# Patient Record
Sex: MALE | Race: WHITE
[De-identification: names, ages, dates, MRNs, and addresses within clinical notes are randomized per-mention and may not be internally consistent; named-entity substitution may affect disease eponyms.]

---

## 2017-01-30 NOTE — EDDOCDS
Physician Documentation                                                                           

[facility]                                                                         

Name: Dejuan Knowles                                                                              

Age: 61 yrs                                                                                       

Sex: Male                                                                                         

: 1955                                                                                   

MRN: [id number]                                                                                     

Arrival Date: 2017                                                                          

Time: 14:44                                                                                       

Account#: [id number]                                                                              

Bed 6                                                                                             

Private MD: Jai Malin                                                                          

Disposition:                                                                                      

                                                                                             

15:51 Critical Care: Critical care not applicable.                                            pc  

                                                                                                  

Disposition:                                                                                      

17 15:54 Transfer ordered to [facility]. Diagnosis are Non-pressure      

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                            

- Reason for transfer: Higher level of care.                                                      

- Accepting physician is Dr. Hawley for Dr. Ga.                                               

- Condition is Stable.                                                                            

- Problem is an ongoing problem.                                                                  

- Symptoms are unchanged.                                                                         

                                                                                                  

                                                                                                  

                                                                                                  

HPI:                                                                                              

15:47 This 61 yrs old  Male presents to ER via Wheelchair with complaints of FOOT    pc  

      INFECTION.                                                                                  

15:47 The history is obtained from the patient. He was sent from his Podiatrist's office to     

      arrange transfer to [facility]. He has a MRSA foot ulcer in the setting of vascular disease        

      and DM. At their worst, the symptoms were moderate. In the emergency department, the        

      symptoms are unchanged. The patient has experienced a previous episode, last month.         

                                                                                                  

Historical:                                                                                       

- Allergies: Metformin HCl;                                                                       

- Home Meds:                                                                                      

1. aspirin 81 mg Oral tab 1 tab once daily                                                      

2. Levemir 100 unit/mL subcutaneous soln 30 units qhs                                           

3. nitroglycerin 0.4 mg SL subl 1 tab every 5 minutes as needed                                 

4. gabapentin 300 mg/6 mL (6 mL) Oral soln 3 times per day                                      

5. Bactrim -160 mg Oral tab 1 tab 2 times per day                                         

- PMHx: Diabetes - IDDM: controlled; Hypertension; MI; MRSA;                                      

- PSHx: right foot surgery; neck tumor; Appendectomy; Orchiectomy- Left;                          

- The history from nurses notes was reviewed: and I agree with what is documented.                

- Social history: Smoking status: Patient states former smoker of tobacco. No barriers            

to communication noted, The patient speaks fluent English.                                      

- Family history: Not pertinent.                                                                  

- : The pt / caregiver states he / she is not on anticoagulants. Home medication list             

is obtained from the patient, [employer] import data.                                              

- Hospitalizations: : No recent hospitalization is reported.                                      

- Exposure Risk Screening:: None identified.                                                      

- Immunization history:: All immunizations up-to-date.                                            

- Social history:: the patient is a former smoker, the patient does not drink alcohol.            

                                                                                                  

                                                                                                  

ROS:                                                                                              

15:47 All systems are negative except as listed.                                              pc  

                                                                                                  

Exam:                                                                                             

15:47 General Appearance: no acute distress, alert.                                           pc  

15:47 Respiratory: no respiratory distress, Breath sounds: wheezing, scattered.                   

15:47 CVS: regular pulse rate, regular rhythm, normal S1 and S2, no murmurs, strong               

      peripheral pulses.                                                                          

15:47 Extremities: right foot in dressing and walking boot, applied just PTA by Podiatry and      

      will not be examined further.                                                               

15:47 Neuro: oriented x 3, cranial nerves normal as tested.                                       

                                                                                                  

Vital Signs:                                                                                      

14:47  / 68; Pulse 69; Resp 18 S; Temp 96.8(O); Pulse Ox 96% on R/A; Weight 149.69 kg / dd6 

      330.01 lbs (R); Height 6 ft. 0 in. (182.88 cm) (R);                                         

17:37  / 72; Pulse 74; Resp 18; Temp 95.8(O); Pulse Ox 95% on R/A;                      srm 

14:47 Body Mass Index 44.76 (149.69 kg, 182.88 cm)                                            dd6 

                                                                                                  

MDM:                                                                                              

15:45 [facility] Payment Agreement was scanned into [employer] and attached to record.               [facility] 

15:45 Financial registration complete.                                                        [facility] 

15:51 Differential Diagnosis: diabetic right foot ulcer, MRSA positive. Plan: d/w Dr. roman Hawley re: labs, etc. Data reviewed: old medical records, vital signs, nurses notes.      

      Test interpretation: none. The patient has been re-examined and re-evaluated. The           

      clinical presentation did not require any ED treatment or interventions. Physician          

      consultation: Dr. Hawley was contacted at 15:52, regarding patient's condition, and       

      he in fact accepted the patient transfer from Dr. Saravia (podiatry) and does not         

      want any labs or testing done prior to transfer. . Disposition: The historical points,      

      examination findings, and any diagnostic results supporting the provided diagnosis,         

      were discussed with the patient or legal guardian. The decision to transfer to the          

      patient to another facility was explained, based on the need for a required specialist      

      that [facility] does not immediately have available.                          

                                                                                                  

Signatures:                                                                                       

Jean Saab MD MD pc Michelson, Staci, RN                    RN   Opal Durbin RN RN dls Beck, Gabriela gjb                                                  

                                                                                                  

The chart was reviewed and I authenticate all verbal orders and agree with the evaluation and 
treatment provided.Corrections: (The following items were deleted from the chart)

15:25 15:02 Home Meds: antibiotic; dls                                                        srm 

                                                                                                  

Attachments:                                                                                      

15:45 [facility] Payment Agreement                                                                violetta 

                                                                                                  

**************************************************************************************************

MTDD

## 2017-01-30 NOTE — EDDOCDS
Nurse's Notes                                                                                     

[facility]                                                                         

Name: Dejuan Knowles                                                                              

Age: 61 yrs                                                                                       

Sex: Male                                                                                         

: 1955                                                                                   

MRN: [id number]                                                                                     

Arrival Date: 2017                                                                          

Time: 14:44                                                                                       

Account#: [id number]                                                                              

Bed 6                                                                                             

Private MD: Jai Malin                                                                          

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        

                                                                                                  

Presentation:                                                                                     

                                                                                             

14:57 Presenting complaint: Patient states: Pt sent to ED from Dr Gonzalez.s office for      dls 

      vascular problem right foot pt unable to transport himself to [place] to vascular          

      specialist. Adult Sepsis Screening: The patient does not have new or worsening altered      

      mentation. Patient's respiratory rate is less than 22. Systolic blood pressure is           

      greater than 100. Patient has a qSOFA score of 0- Negative Sepsis Screen.                   

      Suicide/Homicide risk assessment- the patient denies having any suicidal and/or             

      homicidal ideations and does not present with any other emotional, behavioral or mental     

      health complaints.  Status: Patient is not a  or              

      dependent. Transition of care: patient was received from Lisa.                         

14:57 Acuity: KAITLIN Level 3                                                                     dls 

14:57 Method Of Arrival: Wheelchair                                                           dls 

                                                                                                  

Triage Assessment:                                                                                

15:02 General: Appears uncomfortable, Behavior is cooperative. Pain: Pain currently is 2 out  dls 

      of 10 on a pain scale. HIV screening NA for this visit Offered previously.                  

                                                                                                  

Historical:                                                                                       

- Allergies: Metformin HCl;                                                                       

- Home Meds:                                                                                      

1. aspirin 81 mg Oral tab 1 tab once daily                                                      

2. Levemir 100 unit/mL subcutaneous soln 30 units qhs                                           

3. nitroglycerin 0.4 mg SL subl 1 tab every 5 minutes as needed                                 

4. gabapentin 300 mg/6 mL (6 mL) Oral soln 3 times per day                                      

5. Bactrim -160 mg Oral tab 1 tab 2 times per day                                         

- PMHx: Diabetes - IDDM: controlled; Hypertension; MI; MRSA;                                      

- PSHx: right foot surgery; neck tumor; Appendectomy; Orchiectomy- Left;                          

- The history from nurses notes was reviewed: and I agree with what is documented.                

- Social history: Smoking status: Patient states former smoker of tobacco. No barriers            

to communication noted, The patient speaks fluent English.                                      

- Family history: Not pertinent.                                                                  

- : The pt / caregiver states he / she is not on anticoagulants. Home medication list             

is obtained from the patient, [employer] import data.                                              

- Hospitalizations: : No recent hospitalization is reported.                                      

- Exposure Risk Screening:: None identified.                                                      

- Immunization history:: All immunizations up-to-date.                                            

- Social history:: the patient is a former smoker, the patient does not drink alcohol.            

                                                                                                  

                                                                                                  

Screenin:35 Screening information is obtained from the patient. Screening information is obtained   srm 

      from pt has PHN that comes in to asses foot wound. Fall risk: No risks identified.          

      Assistance ADL's: requires no assistance with activities of daily living. Abuse/DV          

      Screen: The patient / caregiver reports he/she is: not in a situation that causes fear,     

      pain or injury. Nutritional screening: No deficits noted. Advance Directives:               

      Currently, there is no health care proxy. There is no active DNR order. home support is     

      adequate.                                                                                   

                                                                                                  

Assessment:                                                                                       

15:24 General: Appears in no apparent distress, Behavior is appropriate for age, cooperative. srm 

      Respiratory: No deficits noted. GI: No deficits noted.                                      

16:37 Reassessment: Patient appears in no apparent distress at this time.                     srm 

17:37 Reassessment: dressing remains intact from dr ortez's office.. General: Appears in  srm 

      no apparent distress, Behavior is appropriate for age, cooperative. Neurological: Level     

      of Consciousness is awake, alert, Oriented to person, place, time,  are equal          

      bilaterally Moves all extremities. Full function Gait is normal for pt. Speech is           

      normal, Facial symmetry appears normal. EENT: No deficits noted. Cardiovascular: No         

      deficits noted. Respiratory: No deficits noted.                                             

                                                                                                  

Vital Signs:                                                                                      

14:47  / 68; Pulse 69; Resp 18 S; Temp 96.8(O); Pulse Ox 96% on R/A; Weight 149.69 kg   dd6 

      (R); Height 6 ft. 0 in. (182.88 cm) (R);                                                    

17:37  / 72; Pulse 74; Resp 18; Temp 95.8(O); Pulse Ox 95% on R/A;                      srm 

14:47 Body Mass Index 44.76 (149.69 kg, 182.88 cm)                                            dd6 

                                                                                                  

Vitals:                                                                                           

14:47 Log In Time: 2017 at 14:46.                                                 dd6 

                                                                                                  

ED Course:                                                                                        

14:46 Patient visited by Bartolo Siu PCA.                                             dd6 

14:46 Jai Malin DO is Private Physician.                                                  dd6 

14:46 Patient moved to Waiting                                                                dd6 

14:48 Patient moved to Pre RCE                                                                dd6 

15:00 Triage Initiated                                                                        dls 

15:03 Sahra Dennison RN is Primary Nurse.                                                  dls 

15:03 Patient moved to 6                                                                      dls 

15:05 Jean Saab MD is Attending Physician.                                               pc  

15:24 Patient visited by Jean Saab MD.                                                   pc  

15:24 The patient / caregiver is instructed regarding the plan of care and ED course. Patient srm 

      has correct armband on for positive identification. Bed in low position.                    

15:25 Patient visited by Sahra Dennison RN.                                                srm 

15:45 [facility] Payment Agreement was scanned into [employer] and attached to record.               gjb 

16:14 Patient name changed from Dejuan\S\\S\Virkler\S\ to Dejuan\S\ \S\Virkler.                      
   EDMS

16:34 at 1627 report called to andrew la rn from [facility].                                 srm 

16:35 No IV's were initiated during this patient's visit. No procedures done that require     srm 

      assistance.                                                                                 

16:37 Patient visited by Sahra Dennison RN.                                                srm 

                                                                                                  

Intake:                                                                                           

17:37 PO: 600.00ml; Total: 600.00ml.                                                          srm 

                                                                                                  

Order Results:                                                                                    

There are currently no results for this order.                                                    

Outcome:                                                                                          

15:54 ER care complete, transfer ordered by Provider.                                         pc  

16:35 Condition: stable. No special radiology studies were completed. Admission hand-off:     srm 

      Report called to krista la rn.                                                                

17:37 Discharge Assessment: Patient awake, alert and oriented x 3. No cognitive and/or        srm 

      functional deficits noted. Patient verbalized understanding of disposition                  

      instructions. patient administered narcotics - no. The following High Risk Discharge        

      criteria are identified: None. Transferred to [facility]. by EMS         

      ground [facility] ambulance report to accompanying personnel perry boateng and christal roman,         

      Transfer form completed. Property :Personal belongings accompany Pt.                        

17:40 Patient left the ED.                                                                    srm 

                                                                                                  

Signatures:                                                                                       

Dispatcher [employer]                           EDMS                                                 

Jean Saab MD MD                                                      

Sahra Dennison RN RN srm Scott, Debra, RN RN   [facility]                                                  

Bartolo Siu, PCA                 PCA  dd6                                                  

Lalitha Yeboah                               gj                                                  

                                                                                                  

Corrections: (The following items were deleted from the chart)                                    

15:25 15:02 Home Meds: antibiotic; dls                                                        srm 

                                                                                                  

**************************************************************************************************

MTDD

## 2017-02-01 NOTE — EDDOCDS
Nurse's Notes                                                                                     

[facility]                                                                         

Name: Dejuan Knowles                                                                              

Age: 61 yrs                                                                                       

Sex: Male                                                                                         

: 1955                                                                                   

MRN: [id number]                                                                                     

Arrival Date: 2017                                                                          

Time: 14:44                                                                                       

Account#: [id number]                                                                              

Bed 6                                                                                             

Private MD: Jai Malin                                                                          

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        

                                                                                                  

Presentation:                                                                                     

                                                                                             

14:57 Presenting complaint: Patient states: Pt sent to ED from Dr Gonzalez.s office for      dls 

      vascular problem right foot pt unable to transport himself to [place] to vascular          

      specialist. Adult Sepsis Screening: The patient does not have new or worsening altered      

      mentation. Patient's respiratory rate is less than 22. Systolic blood pressure is           

      greater than 100. Patient has a qSOFA score of 0- Negative Sepsis Screen.                   

      Suicide/Homicide risk assessment- the patient denies having any suicidal and/or             

      homicidal ideations and does not present with any other emotional, behavioral or mental     

      health complaints.  Status: Patient is not a  or              

      dependent. Transition of care: patient was received from Lisa.                         

14:57 Acuity: KAITLIN Level 3                                                                     dls 

14:57 Method Of Arrival: Wheelchair                                                           dls 

                                                                                                  

Triage Assessment:                                                                                

15:02 General: Appears uncomfortable, Behavior is cooperative. Pain: Pain currently is 2 out  dls 

      of 10 on a pain scale. HIV screening NA for this visit Offered previously.                  

                                                                                                  

Historical:                                                                                       

- Allergies: Metformin HCl;                                                                       

- Home Meds:                                                                                      

1. aspirin 81 mg Oral tab 1 tab once daily                                                      

2. Levemir 100 unit/mL subcutaneous soln 30 units qhs                                           

3. nitroglycerin 0.4 mg SL subl 1 tab every 5 minutes as needed                                 

4. gabapentin 300 mg/6 mL (6 mL) Oral soln 3 times per day                                      

5. Bactrim -160 mg Oral tab 1 tab 2 times per day                                         

- PMHx: Diabetes - IDDM: controlled; Hypertension; MI; MRSA;                                      

- PSHx: right foot surgery; neck tumor; Appendectomy; Orchiectomy- Left;                          

- The history from nurses notes was reviewed: and I agree with what is documented.                

- Social history: Smoking status: Patient states former smoker of tobacco. No barriers            

to communication noted, The patient speaks fluent English.                                      

- Family history: Not pertinent.                                                                  

- : The pt / caregiver states he / she is not on anticoagulants. Home medication list             

is obtained from the patient, [employer] import data.                                              

- Hospitalizations: : No recent hospitalization is reported.                                      

- Exposure Risk Screening:: None identified.                                                      

- Immunization history:: All immunizations up-to-date.                                            

- Social history:: the patient is a former smoker, the patient does not drink alcohol.            

                                                                                                  

                                                                                                  

Screenin:35 Screening information is obtained from the patient. Screening information is obtained   srm 

      from pt has PHN that comes in to asses foot wound. Fall risk: No risks identified.          

      Assistance ADL's: requires no assistance with activities of daily living. Abuse/DV          

      Screen: The patient / caregiver reports he/she is: not in a situation that causes fear,     

      pain or injury. Nutritional screening: No deficits noted. Advance Directives:               

      Currently, there is no health care proxy. There is no active DNR order. home support is     

      adequate.                                                                                   

                                                                                                  

Assessment:                                                                                       

15:24 General: Appears in no apparent distress, Behavior is appropriate for age, cooperative. srm 

      Respiratory: No deficits noted. GI: No deficits noted.                                      

16:37 Reassessment: Patient appears in no apparent distress at this time.                     srm 

17:37 Reassessment: dressing remains intact from dr ortez's office.. General: Appears in  srm 

      no apparent distress, Behavior is appropriate for age, cooperative. Neurological: Level     

      of Consciousness is awake, alert, Oriented to person, place, time,  are equal          

      bilaterally Moves all extremities. Full function Gait is normal for pt. Speech is           

      normal, Facial symmetry appears normal. EENT: No deficits noted. Cardiovascular: No         

      deficits noted. Respiratory: No deficits noted.                                             

                                                                                                  

Vital Signs:                                                                                      

14:47  / 68; Pulse 69; Resp 18 S; Temp 96.8(O); Pulse Ox 96% on R/A; Weight 149.69 kg   dd6 

      (R); Height 6 ft. 0 in. (182.88 cm) (R);                                                    

17:37  / 72; Pulse 74; Resp 18; Temp 95.8(O); Pulse Ox 95% on R/A;                      srm 

14:47 Body Mass Index 44.76 (149.69 kg, 182.88 cm)                                            dd6 

                                                                                                  

Vitals:                                                                                           

14:47 Log In Time: 2017 at 14:46.                                                 dd6 

                                                                                                  

ED Course:                                                                                        

14:46 Patient visited by Bartolo Siu PCA.                                             dd6 

14:46 Jai Malin DO is Private Physician.                                                  dd6 

14:46 Patient moved to Waiting                                                                dd6 

14:48 Patient moved to Pre RCE                                                                dd6 

15:00 Triage Initiated                                                                        dls 

15:03 Sahra Dennison RN is Primary Nurse.                                                  dls 

15:03 Patient moved to 6                                                                      dls 

15:05 Jean Saab MD is Attending Physician.                                               pc  

15:24 Patient visited by Jean Saab MD.                                                   pc  

15:24 The patient / caregiver is instructed regarding the plan of care and ED course. Patient srm 

      has correct armband on for positive identification. Bed in low position.                    

15:25 Patient visited by Sahra Dennison RN.                                                srm 

15:45 [facility] Payment Agreement was scanned into [employer] and attached to record.               gjb 

16:14 Patient name changed from Dejuan\S\\S\Virkler\S\ to Dejuan\S\ \S\Virkler.                      
   EDMS

16:34 at 1627 report called to andrew la rn from [facility].                                 srm 

16:35 No IV's were initiated during this patient's visit. No procedures done that require     srm 

      assistance.                                                                                 

16:37 Patient visited by Sahra Dennison RN.                                                srm 

                                                                                                  

Intake:                                                                                           

17:37 PO: 600.00ml; Total: 600.00ml.                                                          srm 

                                                                                                  

Order Results:                                                                                    

There are currently no results for this order.                                                    

Outcome:                                                                                          

15:54 ER care complete, transfer ordered by Provider.                                         pc  

16:35 Condition: stable. No special radiology studies were completed. Admission hand-off:     srm 

      Report called to krista la rn.                                                                

17:37 Discharge Assessment: Patient awake, alert and oriented x 3. No cognitive and/or        srm 

      functional deficits noted. Patient verbalized understanding of disposition                  

      instructions. patient administered narcotics - no. The following High Risk Discharge        

      criteria are identified: None. Transferred to [facility]. by EMS         

      ground [facility] ambulance report to accompanying personnel perry boateng and christal roman,         

      Transfer form completed. Property :Personal belongings accompany Pt.                        

17:40 Patient left the ED.                                                                    srm 

                                                                                                  

Signatures:                                                                                       

Dispatcher [employer]                           EDMS                                                 

Jean Saab MD MD                                                      

Sahra Dennison RN RN srm Scott, Debra, RN RN   [facility]                                                  

Bartolo Siu, PCA                 PCA  dd6                                                  

Lalitha Yeboah                               gj                                                  

                                                                                                  

Corrections: (The following items were deleted from the chart)                                    

15:25 15:02 Home Meds: antibiotic; dls                                                        srm 

                                                                                                  

**************************************************************************************************



*** Chart Complete ***
MTDD

## 2017-02-01 NOTE — EDDOCDS
Physician Documentation                                                                           

[facility]                                                                         

Name: Dejuan Knowles                                                                              

Age: 61 yrs                                                                                       

Sex: Male                                                                                         

: 1955                                                                                   

MRN: [id number]                                                                                     

Arrival Date: 2017                                                                          

Time: 14:44                                                                                       

Account#: [id number]                                                                              

Bed 6                                                                                             

Private MD: Jai Malin                                                                          

Disposition:                                                                                      

                                                                                             

15:51 Critical Care: Critical care not applicable.                                            pc  

                                                                                                  

Disposition:                                                                                      

17 15:54 Transfer ordered to [facility]. Diagnosis are Non-pressure      

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                            

- Reason for transfer: Higher level of care.                                                      

- Accepting physician is Dr. Hawley for Dr. Ga.                                               

- Condition is Stable.                                                                            

- Problem is an ongoing problem.                                                                  

- Symptoms are unchanged.                                                                         

                                                                                                  

                                                                                                  

                                                                                                  

HPI:                                                                                              

15:47 This 61 yrs old  Male presents to ER via Wheelchair with complaints of FOOT    pc  

      INFECTION.                                                                                  

15:47 The history is obtained from the patient. He was sent from his Podiatrist's office to     

      arrange transfer to [facility]. He has a MRSA foot ulcer in the setting of vascular disease        

      and DM. At their worst, the symptoms were moderate. In the emergency department, the        

      symptoms are unchanged. The patient has experienced a previous episode, last month.         

                                                                                                  

Historical:                                                                                       

- Allergies: Metformin HCl;                                                                       

- Home Meds:                                                                                      

1. aspirin 81 mg Oral tab 1 tab once daily                                                      

2. Levemir 100 unit/mL subcutaneous soln 30 units qhs                                           

3. nitroglycerin 0.4 mg SL subl 1 tab every 5 minutes as needed                                 

4. gabapentin 300 mg/6 mL (6 mL) Oral soln 3 times per day                                      

5. Bactrim -160 mg Oral tab 1 tab 2 times per day                                         

- PMHx: Diabetes - IDDM: controlled; Hypertension; MI; MRSA;                                      

- PSHx: right foot surgery; neck tumor; Appendectomy; Orchiectomy- Left;                          

- The history from nurses notes was reviewed: and I agree with what is documented.                

- Social history: Smoking status: Patient states former smoker of tobacco. No barriers            

to communication noted, The patient speaks fluent English.                                      

- Family history: Not pertinent.                                                                  

- : The pt / caregiver states he / she is not on anticoagulants. Home medication list             

is obtained from the patient, [employer] import data.                                              

- Hospitalizations: : No recent hospitalization is reported.                                      

- Exposure Risk Screening:: None identified.                                                      

- Immunization history:: All immunizations up-to-date.                                            

- Social history:: the patient is a former smoker, the patient does not drink alcohol.            

                                                                                                  

                                                                                                  

ROS:                                                                                              

15:47 All systems are negative except as listed.                                              pc  

                                                                                                  

Exam:                                                                                             

15:47 General Appearance: no acute distress, alert.                                           pc  

15:47 Respiratory: no respiratory distress, Breath sounds: wheezing, scattered.                   

15:47 CVS: regular pulse rate, regular rhythm, normal S1 and S2, no murmurs, strong               

      peripheral pulses.                                                                          

15:47 Extremities: right foot in dressing and walking boot, applied just PTA by Podiatry and      

      will not be examined further.                                                               

15:47 Neuro: oriented x 3, cranial nerves normal as tested.                                       

                                                                                                  

Vital Signs:                                                                                      

14:47  / 68; Pulse 69; Resp 18 S; Temp 96.8(O); Pulse Ox 96% on R/A; Weight 149.69 kg / dd6 

      330.01 lbs (R); Height 6 ft. 0 in. (182.88 cm) (R);                                         

17:37  / 72; Pulse 74; Resp 18; Temp 95.8(O); Pulse Ox 95% on R/A;                      srm 

14:47 Body Mass Index 44.76 (149.69 kg, 182.88 cm)                                            dd6 

                                                                                                  

MDM:                                                                                              

15:45 [facility] Payment Agreement was scanned into [employer] and attached to record.               [facility] 

15:45 Financial registration complete.                                                        [facility] 

15:51 Differential Diagnosis: diabetic right foot ulcer, MRSA positive. Plan: d/w Dr. roman Hawley re: labs, etc. Data reviewed: old medical records, vital signs, nurses notes.      

      Test interpretation: none. The patient has been re-examined and re-evaluated. The           

      clinical presentation did not require any ED treatment or interventions. Physician          

      consultation: Dr. Hwaley was contacted at 15:52, regarding patient's condition, and       

      he in fact accepted the patient transfer from Dr. Saravia (podiatry) and does not         

      want any labs or testing done prior to transfer. . Disposition: The historical points,      

      examination findings, and any diagnostic results supporting the provided diagnosis,         

      were discussed with the patient or legal guardian. The decision to transfer to the          

      patient to another facility was explained, based on the need for a required specialist      

      that [facility] does not immediately have available.                          

                                                                                                  

Signatures:                                                                                       

Jean Saab MD MD pc Michelson, Staci, RN                    RN   Opal Durbin RN RN dls Beck, Gabriela gjb                                                  

                                                                                                  

The chart was reviewed and I authenticate all verbal orders and agree with the evaluation and 
treatment provided.Corrections: (The following items were deleted from the chart)

15:25 15:02 Home Meds: antibiotic; dls                                                        srm 

                                                                                                  

Attachments:                                                                                      

15:45 [facility] Payment Agreement                                                                violetta 

                                                                                                  

**************************************************************************************************



*** Chart Complete ***
MTDD

## 2017-02-01 NOTE — EDDOCDS
Physician Documentation                                                                           

[facility]                                                                         

Name: Dejuan Knowles                                                                              

Age: 61 yrs                                                                                       

Sex: Male                                                                                         

: 1955                                                                                   

MRN: [id number]                                                                                     

Arrival Date: 2017                                                                          

Time: 14:44                                                                                       

Account#: [id number]                                                                              

Bed 6                                                                                             

Private MD: Jai Malin                                                                          

Disposition:                                                                                      

                                                                                             

15:51 Critical Care: Critical care not applicable.                                            pc  

                                                                                                  

Disposition:                                                                                      

17 15:54 Transfer ordered to [facility]. Diagnosis are Non-pressure      

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                            

- Reason for transfer: Higher level of care.                                                      

- Accepting physician is Dr. Hawley for Dr. Ga.                                               

- Condition is Stable.                                                                            

- Problem is an ongoing problem.                                                                  

- Symptoms are unchanged.                                                                         

                                                                                                  

                                                                                                  

                                                                                                  

HPI:                                                                                              

15:47 This 61 yrs old  Male presents to ER via Wheelchair with complaints of FOOT    pc  

      INFECTION.                                                                                  

15:47 The history is obtained from the patient. He was sent from his Podiatrist's office to     

      arrange transfer to [facility]. He has a MRSA foot ulcer in the setting of vascular disease        

      and DM. At their worst, the symptoms were moderate. In the emergency department, the        

      symptoms are unchanged. The patient has experienced a previous episode, last month.         

                                                                                                  

Historical:                                                                                       

- Allergies: Metformin HCl;                                                                       

- Home Meds:                                                                                      

1. aspirin 81 mg Oral tab 1 tab once daily                                                      

2. Levemir 100 unit/mL subcutaneous soln 30 units qhs                                           

3. nitroglycerin 0.4 mg SL subl 1 tab every 5 minutes as needed                                 

4. gabapentin 300 mg/6 mL (6 mL) Oral soln 3 times per day                                      

5. Bactrim -160 mg Oral tab 1 tab 2 times per day                                         

- PMHx: Diabetes - IDDM: controlled; Hypertension; MI; MRSA;                                      

- PSHx: right foot surgery; neck tumor; Appendectomy; Orchiectomy- Left;                          

- The history from nurses notes was reviewed: and I agree with what is documented.                

- Social history: Smoking status: Patient states former smoker of tobacco. No barriers            

to communication noted, The patient speaks fluent English.                                      

- Family history: Not pertinent.                                                                  

- : The pt / caregiver states he / she is not on anticoagulants. Home medication list             

is obtained from the patient, [employer] import data.                                              

- Hospitalizations: : No recent hospitalization is reported.                                      

- Exposure Risk Screening:: None identified.                                                      

- Immunization history:: All immunizations up-to-date.                                            

- Social history:: the patient is a former smoker, the patient does not drink alcohol.            

                                                                                                  

                                                                                                  

ROS:                                                                                              

15:47 All systems are negative except as listed.                                              pc  

                                                                                                  

Exam:                                                                                             

15:47 General Appearance: no acute distress, alert.                                           pc  

15:47 Respiratory: no respiratory distress, Breath sounds: wheezing, scattered.                   

15:47 CVS: regular pulse rate, regular rhythm, normal S1 and S2, no murmurs, strong               

      peripheral pulses.                                                                          

15:47 Extremities: right foot in dressing and walking boot, applied just PTA by Podiatry and      

      will not be examined further.                                                               

15:47 Neuro: oriented x 3, cranial nerves normal as tested.                                       

                                                                                                  

Vital Signs:                                                                                      

14:47  / 68; Pulse 69; Resp 18 S; Temp 96.8(O); Pulse Ox 96% on R/A; Weight 149.69 kg / dd6 

      330.01 lbs (R); Height 6 ft. 0 in. (182.88 cm) (R);                                         

17:37  / 72; Pulse 74; Resp 18; Temp 95.8(O); Pulse Ox 95% on R/A;                      srm 

14:47 Body Mass Index 44.76 (149.69 kg, 182.88 cm)                                            dd6 

                                                                                                  

MDM:                                                                                              

15:45 [facility] Payment Agreement was scanned into [employer] and attached to record.               [facility] 

15:45 Financial registration complete.                                                        [facility] 

15:51 Differential Diagnosis: diabetic right foot ulcer, MRSA positive. Plan: d/w Dr. roman Hawley re: labs, etc. Data reviewed: old medical records, vital signs, nurses notes.      

      Test interpretation: none. The patient has been re-examined and re-evaluated. The           

      clinical presentation did not require any ED treatment or interventions. Physician          

      consultation: Dr. Hawley was contacted at 15:52, regarding patient's condition, and       

      he in fact accepted the patient transfer from Dr. Saravia (podiatry) and does not         

      want any labs or testing done prior to transfer. . Disposition: The historical points,      

      examination findings, and any diagnostic results supporting the provided diagnosis,         

      were discussed with the patient or legal guardian. The decision to transfer to the          

      patient to another facility was explained, based on the need for a required specialist      

      that [facility] does not immediately have available.                          

                                                                                                  

Signatures:                                                                                       

Jean Saab MD MD pc Michelson, Staci, RN                    RN   Opal Durbin RN RN dls Beck, Gabriela gjb                                                  

                                                                                                  

The chart was reviewed and I authenticate all verbal orders and agree with the evaluation and 
treatment provided.Corrections: (The following items were deleted from the chart)

15:25 15:02 Home Meds: antibiotic; dls                                                        srm 

                                                                                                  

Attachments:                                                                                      

15:45 [facility] Payment Agreement                                                                violetta 

                                                                                                  

**************************************************************************************************



*** Chart Complete ***
MTDD

## 2017-03-14 NOTE — HPE
DATE OF ADMISSION:  2017

 

PRIMARY CARE PROVIDER: [facility], Dr. Malin

 

CHIEF COMPLAINT: Abdominal pain and distention, hypoxia.

 

HISTORY OF THE PRESENT ILLNESS: This is a 62-year-old male patient with

underlying medical history of morbid obesity, insulin-dependent diabetes,

hypertension, coronary artery disease with percutaneous coronary intervention

(PCI), recent right lower extremity abscess and cellulitis, status post incision

and drainage with wound VAC and on intravenous (IV) antibiotics at home. The

patient presented with acute onset, woken up with abdominal firmness and

tenderness that has been new and also worsening abdominal distention and lower

extremity swelling and dyspnea. Subsequently presently to the hospital. The

patient denies any chest pain, pressure or discomfort. Reported shortness of

breath that has improved since the patient arrived at the hospital. Not on oxygen

at home and does not use continuous positive airway pressure (CPAP) at home, even

though the patient thinks that he might have obstructive sleep apnea. The patient

lives alone at home. Denies any fevers or chills, diarrhea, constipation, nausea

or vomiting, palpitations. No sick contact.

 

ALLERGIES: METFORMIN.

 

PAST MEDICAL HISTORY:

Morbid obesity.

Recent cellulitis and abscess of lower extremity.

Insulin-dependent type 2 diabetes.

Hypertension.

Coronary artery disease.

Questionable history of obstructive sleep apnea.

 

PAST SURGICAL HISTORY:

Appendectomy.

Thyroidectomy.

Left testicular resection.

Cardiac catheterization.

 

SOCIAL HISTORY: The patient lives at home alone. Quit smoking 2016. Smoked one

pack per day for 25 years. Quit alcohol drinking 20 years ago. No other illicit

drug use.

 

FAMILY HISTORY: Father with myocardial infarction (MI) at age 48.

 

REVIEW OF SYSTEMS: 11-point review of systems are negative except for those

mentioned in the history of the present illness.

 

HOME MEDICATIONS:

- acetaminophen 1000 mg by mouth daily as needed

- aspirin 81 mg by mouth daily

- gabapentin 300 mg by mouth three times a day

- Levemir insulin 15 units subcu in the morning, 10 units subcu in the evening

- Levaquin 750 mg by mouth daily

- nitroglycerin 0.4 mg sublingual as needed

- Flomax 0.4 mg by mouth daily

 

PHYSICAL EXAMINATION:

VITAL SIGNS: Temperature 98.1, pulse 85, respirations 18, blood pressure 156/88,

pulse oximetry 94% on 4 liters nasal cannula.

GENERAL: Patient morbidly obese, in no acute distress.

HEENT: Normocephalic, atraumatic.

PULMONARY: Distant breath sounds, bibasilar rhonchi.

CARDIAC: Regular rate and rhythm. Normal S1, S2.

ABDOMEN: Lower abdomen anasarca, soft, obese. Distant bowel sounds. No rebound,

no guarding.

EXTREMITIES: Bilateral lower extremities 3+ edema with anasarca. Right lower

extremity dressing is intact. The patient is disheveled. Wound VAC in place.

 

LABORATORY:  AB.246, 73, likely venous with PaO2 of 42.4. WBC 6.8, hemoglobin

and hematocrit 10.4 over 34.3, platelets 207. Chemistry: Sodium 142, potassium

4.4, chloride 104, bicarbonate 30, BUN 16, creatinine 1.42. Cardiac enzymes

negative times one. B-natriuretic peptide 328. C-reactive protein 3.59. Lipase

82.

 

ASSESSMENT AND PLAN: This is a 62-year-old male patient with underlying medical

history of insulin-dependent type 2 diabetes, morbid obesity, hypertension,

coronary artery disease, cellulitis and abscess of right lower extremity,

presented with abdominal pain and shortness of breath with hypoxic and

hypercarbic respiratory failure.

 

PROBLEMS:

1. Hypoxic hypercarbic respiratory failure. Likely secondary to obesity

hypoventilation syndrome and pulmonary hypertension. Followup echocardiogram.

Diuresis with Lasix. The patient has pleural effusion. CT scan appreciated. Will

continue to follow. If worsens, may need thoracentesis. Repeat arterial blood

gases (ABGs), repeat chest x-rays. Strict intake and output, daily weight.

Telemetry monitoring. Repeat cardiac enzymes.

 

2. Bilateral pleural effusions. Likely secondary to congestive heart failure

(CHF) with a component of pulmonary hypertension, likely diastolic in nature.

Continue Lasix. Repeat chest x-rays. Followup ABGs. Strict intake and output,

daily weight. If worsens, will consider thoracentesis. Continue to follow.

 

3. Chronic kidney disease. Creatinine currently at baseline. Continue to follow

BUN and creatinine, strict intake and output, followup urine output.

 

4. Type 2 diabetes, insulin dependent. Basal bolus insulin. Adjust as needed.

Follow fingersticks.

 

5. Coronary artery disease. Continue aspirin. Telemetry monitoring. Cardiac

enzymes.

 

6. Recent history of right lower extremity cellulitis and abscess. Dr. Saravia

has been consulted for wound care and wound VAC management. Dr. Banerjee has been

consulted. The patient's antibiotics currently switched to Teflaro until

evaluation seen by Dr. Banerjee. The patient has a peripherally inserted central

catheter (PICC) line for access. C-reactive protein appreciated.

 

7. Peripheral neuropathy. Continue home medications.

 

8. Deep vein thrombosis (DVT) prophylaxis. Heparin subcutaneously.

 

9. BPH. Continue home medications.

 

DISPOSITION PLANNING: Pending further workup, clinical improvement. Morbid

obesity complicating care.

## 2017-03-14 NOTE — REP
CT of the chest without IV contrast:

 

Comparison is a plain film study performed earlier today.

 

The study is performed without IV contrast.

 

There are bilateral pleural effusions.  There is compression atelectasis of the

lungs adjacent to the effusions.  The lung fields are otherwise clear.

 

There are multiple normal-sized mediastinal nodes.  A few mediastinal nodes are

borderline enlarged.  The study is insensitive for hilar adenopathy in the

absence of IV contrast.  There is no axillary adenopathy.

 

The unenhanced thoracic aorta is unremarkable.  Cardiac size is upper normal.

 

Upper abdomen:

 

There is cholelithiasis.  The visualized portions of the liver, pancreas and

spleen are unremarkable.  The adrenals are unremarkable.

 

Impression:

 

Bilateral pleural effusions with compression atelectasis of the lower lobe lung

fields adjacent to the effusions.  Multiple mediastinal nodes.  Cholelithiasis.

 

 

Signed by

Fernando Ashley MD 03/14/2017 12:43 P

## 2017-03-14 NOTE — REP
CT abdomen pelvis with bowel contrast.  No IV contrast is utilized.

 

There are no comparison abdominal CT studies.  There is a comparison plain film

study performed earlier today.

 

Within the visualized lower lung fields.  There are bilateral pleural effusions

with compression atelectasis of the adjacent lungs.

 

There are multiple gallbladder calculi.  There is no biliary duct dilatation.

The unenhanced hepatic parenchyma is homogeneous.

 

The unenhanced pancreas and spleen are normal size and unremarkable.

 

The adrenals and unenhanced kidneys are unremarkable.  Abdominal aorta is

unremarkable.

 

The bowel contrast has reached proximal jejunal loops.  There is no bowel

distension or obstruction.  There is no ascites.

 

There are multiple aortocaval nodes measuring up to 18 mm short axis.

 

Pelvis:

 

There is no ascites.  There is no diverticulosis or diverticulitis.  The appendix

is unremarkable.

 

There is edema throughout the subcutaneous fat and mesenteric fat.

 

Impression:

 

Cholelithiasis without evidence of acute cholecystitis or biliary duct

dilatation.

 

Retroperitoneal adenopathy.

 

The appendix is unremarkable.  There is no diverticulosis or diverticulitis.

 

There is no ascites.

 

There is edema in the subcutaneous fat and mesenteric fat.

 

There is a 15 mm cutaneous  nodule inferiorly in the pelvis on the right on image

139 extending deep into the subcutaneous fat.

 

 

Signed by

Fernando Ashley MD 03/14/2017 12:54 P

## 2017-03-14 NOTE — REP
Abdominal series:  Four views.

 

History:  Abdominal pain.

 

Findings:  Comparison chest x-ray is from December 6, 2016.

 

EKG monitoring electrodes are seen.  A right-sided PICC line is seen with its tip

terminating in the expected location of the superior vena cava.  Moderate

cardiomegaly is observed.  There are pleural opacities in both bases consistent

with bilateral effusions.  Pulmonary vasculature is not increased.  No free

subdiaphragmatic air is seen.

 

Supine and erect views of the abdomen show air and stool in a nondistended colon.

There are a few loops of air-filled normal-sized small bowel loops in the right

mid abdomen.  There is a faint calcification in the right upper quadrant

suggestive of a gallstone.  No vascular calcification is seen.  There are

degenerative changes in the lumbar spine and in the hips.

 

Impression:

 

Bilateral pleural effusions, moderate cardiomegaly.  Question gallstone right

upper quadrant calcification.  Nonspecific central abdominal small bowel loops.

No evidence of obstruction or free air.

 

 

Signed by

Gulshan Wellington MD 03/14/2017 01:08 P

## 2017-03-15 NOTE — CR
DATE OF CONSULTATION: 03/15/2017

 

CHIEF COMPLAINT: 62-year-old male seen for evaluation of his right foot. Patient

initially had a central space infection of his right foot.  Patient had an

incision and drainage of the central and lateral compartments subsequently

patient had worsening of his vascular symptoms with gangrenous changes of his

third and fourth toes, was referred to vascular surgery.  He had some digital

amputation performed at that time. Patient has been on a Wound Vac and is seen

today for evaluation at his bed side.

 

ALLERGIES: METFORMIN.

 

HOME MEDICATIONS:

- aspirin 81 mg daily

- acetaminophen 1,000 mg as needed for pain

- gabapentin 300 mg three times a day

- Levaquin 750 mg daily

- nitroglycerin 0.5 mg sublingually as needed

- Flomax 0.4 mg daily

 

PAST SURGICAL HISTORY: Includes debridement of central and lateral right foot

abscess, amputations of the fourth and fifth toes of the right foot,

appendectomy, thyroidectomy, left testicular resection, cardiac catheterization.

 

PHYSICAL EXAMINATION: Patient is alert and well oriented times three, pleasant

and answering questions without difficulty.

 

LABORATORY DATA: Reviewed, white count is 6.2 and ARBC is 3.88.  CRP is 3.48.

 

Evaluation of his foot reveals a Wound Vac in place. Patient states the Wound Vac

was changed yesterday and it is in good operating condition and functioning

satisfactorily.

ASSESSMENT: Diabetic ulcer with status post amputation of his right foot.

PLAN: Patient will continue on Wound Vac therapy. Patient will be seen tomorrow

for application and change of his present Wound Vac. His questions were answered.

 

 

Thank you for this consultation.

## 2017-03-15 NOTE — ECGEPIP
Stationary ECG Study

                              [facility]

                                       

                                       Test Date:    2017-03-15

Pat Name:     ALESIA SANTORO           Department:   

Patient ID:   [id number]                 Room:         [place] [address]

Gender:       M                        Technician:   HEATHER

:          1955               Requested By: LINA ELIZONDO 

Order Number: [id number]     Reading MD:   Sandeep Prieto

                                 Measurements

Intervals                              Axis          

Rate:         77                       P:            31

SC:           155                      QRS:          61

QRSD:         117                      T:            58

QT:           416                                    

QTc:          473                                    

                           Interpretive Statements

SINUS RHYTHM

LOW QRS VOLTAGE IN PRECORDIAL LEADS

POSSIBLE ANTERIOR MYOCARDIAL INFARCTION, OF INDETERMINATE AGE

INFERIOR MYOCARDIAL INFARCTION, PROBABLY OLD

COMPARED TO THE LAST 2 TRACINGS IN THE SYSTEM.  THERE IS NOW NO RIGHT BUNDLE 

BRANCH PATTERN

Electronically Signed On 3- 22:47:45 EDT by Sandeep Prieto

## 2017-03-15 NOTE — IPN
DATE:  03/15/2017

 

Patient is seen and examined. Continued to have dyspnea requiring eight liters

high flow nasal cannula. Denies any chest pain, pressure or discomfort. Continued

to report bilateral lower extremity to abdomen swelling. Denies any diarrhea,

fevers, or chills.

 

VITAL SIGNS: Temperature 98.5, pulse 78, respiratory rate 20, blood pressure

140/69, pulse oximetry 95% on eight liters high flow nasal cannula.

 

LABORATORY DATA: WBC 6.2, hemoglobin and hematocrit 9.7/33.5, platelets 194.

 

Chemistry: Sodium 140, potassium 4.7, chloride 102, bicarbonate 34, BUN 16,

creatinine 1.52, C-reactive protein 3.49. Cardiac enzyme negative times two.

 

PHYSICAL EXAMINATION:

GENERAL: Patient morbidly obese, in no acute distress.

HEENT: Normocephalic, atraumatic.

PULMONARY: Distant breath sounds, bibasilar rhonchi.

CARDIAC: Regular rate and rhythm with normal S1, S2.

ABDOMEN: Lower abdomen anasarca, soft, obese, distant bowel sounds. No rebound.

No guarding.

EXTREMITIES: Bilateral lower extremity 3+ edema. Right lower extremity dressing

clean, dry and intact. Wound vacuum-assisted closure (VAC) in place.

Patient disheveled.

 

ASSESSMENT AND PLAN: This is a 62-year-old male patient with underlying medical

history of insulin-dependent type 2 diabetes, morbid obesity, hypertension,

coronary artery disease, cellulitis and abscess of right lower extremity who

presented with abdominal pain and shortness of breath with hypoxia and

hypercarbic respiratory failure.

 

1. Hypoxic hypercarbic respiratory failure, likely secondary to obesity

hypoventilation syndrome and pulmonary hypertension. Echocardiogram appreciated

showing severe right heart failure and pulmonary hypertension. Diuresis with

Lasix. Patient has pleural effusion on CT scan. May need thoracentesis if

worsens. Followup repeat chest x-ray. Strict intake and output, daily weights,

fluid restriction 1800 mL. Telemetry monitoring. Serial cardiac enzymes negative

times two.

 

2. Bilateral pleural effusion, likely secondary to congestive heart failure with

diastolic dysfunction and severe pulmonary hypertension. Continue Lasix. Strict

intake and output, daily weights, fluid restriction. May need thoracentesis if

worsens.

 

3. Diffuse lymphadenopathy. Case was discussed with infectious disease, Dr. Banerjee.

CT scan appreciated. Might need lymph node excisional biopsy for further

diagnosis. In the meantime, continue antibiotics as ordered. Culture has been

sent.

 

4. Chronic kidney disease (CKD), currently at baseline. Continue to followup

blood urea nitrogen (BUN) and creatinine. Strict intake and output, daily

weights.

 

5. Type 2 diabetes. Insulin dosage has been reduced. Continue basal bolus

insulin. Followup fingersticks, adjust as needed.

 

6. Coronary artery disease. Continue aspirin, telemetry monitoring, cardiac

enzyme.

 

7. Recent history of lower extremity cellulitis and abscess. Dr. Saravia has

been consulted for wound care. The patient has a wound vacuum-assisted closure

(VAC). Dr. Banerjee has also been consulted. Antibiotics as per Dr. Banerjee, currently

on Teflaro. As per Dr. Banerjee, patient recently just finished vancomycin.

C-reactive protein shows improvement. Patient has a peripherally inserted central

catheter (PICC) line.

 

8. Peripheral neuropathy. Continue home medication.

 

9. Benign prostatic hypertrophy (BPH). Continue home medications.

 

10. Deep vein thrombosis (DVT) prophylaxis. Heparin subcutaneous.

 

DISPOSITION PLANNING: Pending further workup, clinical improvement, morbid

obesity with worsening hypoxia and severe right heart failure, poor overall

prognosis.

## 2017-03-15 NOTE — ECHO
DATE OF PROCEDURE: 03/14/2017

 

AGE: 62

GENDER: Male

HEIGHT: 73 inches

WEIGHT: 339 pounds

BODY SURFACE AREA:  2.68 m2

 

PATIENT LOCATION: Inpatient, PCU, room 3225

 

REFERRING PHYSICIAN:  Katey Rincon MD

 

INDICATION: Edema.

 

2-D MEASUREMENTS:

RV: 5.2 cm

LV: 5.5 cm

Septum: 1.3 cm

Posterior wall: 1.3 cm

Aortic root: 3.6 cm

LA: 4.6 cm

LVEF: 65%

 

DOPPLER MEASUREMENTS:

AV: 1.3 m/s

LVOT: 0.91 m/s

LVOT diameter: 2.3 cm

MV-E: 83, A: 65, EA ratio: 1.3

Early mitral deceleration time: 157 ms

E prime: 7.6, A prime 13.7, E/E prime ratio: 10.9

PV: 0.6 m/s

Pulmonary artery acceleration time: 81 ms

RVSP: 59 mmHg

IVC: 3.2 cm

 

COMMENTS:

Normal sinus rhythm with incomplete right bundle branch block.

 

Moderately dilated left atrium, but normal left ventricular size.  Right heart

chambers were markedly dilated.  LV wall thickness was mildly increased

symmetrically on real-time imaging from the parasternal and apical projections.

There was a proximal septal wall motion abnormality, but other walls were

hyperkinetic.  Mildly thickened mitral annulus with normal leaflet thickness and

excursion with no posterior systolic buckling.  Three equal size aortic cusps

with marginally thickened cusp edges but adequate cusp separation.  Normal 
aortic

root size.  Unable to detect any intracardiac mass, but there was a minimal

posterior pericardial effusion.  There was also at least moderate size left

pleural effusion.

 

Color flow Doppler study taken from the parasternal and apical projections 
showed

mild mitral and moderate tricuspid, but no aortic insufficiency.

 

Guided continuous wave Doppler of his aortic valve showed a normal peak systolic

velocity against LV outflow tract obstruction.

 

Pulsed and continuous wave Doppler of his LV inflow tract taken from the apical

4 chamber projection showed normal diastolic filling velocities against mitral

stenosis. The filling pattern was pseudo normalized with diastolic dysfunction

confirmed by tissue Doppler of his mitral annulus.  Current estimated mean left

atrial pressure using pulsed and tissue Doppler of his mitral annulus was upper

limits of normal.

 

Pulsed and continuous wave Doppler of his pulmonary trunk taken from the

parasternal short axis projection showed a normal peak systolic velocity against

RV outflow tract obstruction.  His pulmonary artery acceleration time was

markedly abbreviated consistent with an elevated pulmonary vascular resistance.

 

Guided continuous wave Doppler of his tricuspid valve allowed our estimation of

his right ventricular systolic pressure (moderately severely increased).  His

inferior vena cava was also markedly dilated with absent respiratory collapse

consistent with a significantly elevated central venous pressure of 20 mmHg or

possibly more.

 

CONCLUSIONS:

Mild concentric left ventricle hypertrophy with localized septal wall motion

abnormality believed to be related to right ventricular pressure overload.

Preserved global resting left ventricular systolic performance.

 

Moderately dilated left atrium with Doppler evidence of a degree of LV diastolic

dysfunction, yet estimated mean left atrial pressure upper limits of normal at

this time.

 

Prominently dilated right heart chambers with Doppler evidence of moderately

severe to severe pulmonary hypertension.

 

Prominently dilated inferior vena cava with absent respiratory collapse

consistent with significantly elevated central venous pressure and right heart

failure.

 

Subtle degenerative changes of the mitral and aortic valvular apparatus without

aortic functional valvular abnormality but mild posteriorly directed mitral

insufficiency.

MTDD

## 2017-03-16 NOTE — IPN
DATE OF SERVICE:  03/16/2017

 

The patient is seen and examined.  No acute events overnight.  Reported dyspnea

improved.  Reported abdominal pain resolved, and edema also improved, responding

to Lasix.  Denies any chest pain, pressure, or discomfort.

 

VITAL SIGNS:  Temperature 98.9, pulse 80, respiration 16, blood pressure 122/73,

pulse oximetry 97% on 6 liters nasal cannula.

 

LABORATORY:

WBC 5.7, hemoglobin and hematocrit 9.6/32.2, platelets 162.

 

Chemistry:  Sodium 139, potassium 4.6, chloride 99, bicarbonate 35, BUN 19,

creatinine 1.7.

 

PHYSICAL EXAMINATION:

GENERAL:  The patient morbidly obese, in no acute distress.

HEENT:  Normocephalic, atraumatic.

PULMONARY:  Distant breath sounds, bibasilar rhonchi.

CARDIAC:  Regular rate and rhythm, normal S1, S2.

ABDOMEN:  Lower abdominal anasarca, soft, obese, distant bowel sounds.  No

rebound.  No guarding.

EXTREMITIES:  Bilateral lower extremities 3+ edema.  Right lower extremity

dressing clean, dry, and intact.  Wound vacuum-assisted closure (VAC) in place.

The patient disheveled.

 

ASSESSMENT AND PLAN:  This is a 62-year-old male patient with underlying medical

history of insulin-dependent type 2 diabetes, morbid obesity, hypertension,

coronary arterial disease, cellulitis and abscess of right lower extremities

treated by Dr. Banerjee and Dr. Saravia, who presented with abdominal pain and

shortness of breath with hypoxia and hypercarbic respiratory failure.

 

1.  Hypoxic and hypercarbic respiratory failure, secondary to obesity

hypoventilation syndrome and pulmonary hypertension with underlying likely

obstructive sleep apnea.  Echocardiogram appreciated, showing severe right heart

failure and pulmonary hypertension.  Diuresis with Lasix.  The patient also has

pleural effusions on CT scan.  Will followup.  Strict intake and output (I and

O), daily weight, fluid restriction 1800 mL.  Telemetry monitoring.  Serial

cardiac enzymes negative times two.  The patient will need urgent sleep study for

continuous positive airway pressure (CPAP).  On oxygen at home but poorly

compliant.

 

2.  Bilateral pleural effusion, likely secondary to congestive heart failure with

diastolic dysfunction with severe pulmonary hypertension and right heart failure.

Continue Lasix.  Strict I and O, daily weight, fluid restriction.  Will consider

consulting pulmonary if the patient does not improve.

 

3.  Diffuse lymphadenopathy.  The case discussed with infectious disease, Dr. Banerjee.  Dr. Banerjee has reviewed image with Dr. Wellington, radiology.  CT scan appreciated.

No good window for safe lymph node excision on biopsy, recommend currently.

Outpatient followup with repeat CT scan in 3 months.  In the meantime, continue

antibiotics as per Dr. Banerjee.  Culture has been sent.

 

4.  Chronic kidney disease (CKD).  Initially, blood urea nitrogen (BUN) and

creatinine at baseline.  Currently, mildly elevated creatinine and BUN due to

diuresis and fluid restriction.  Lasix dose has been decreased.  Strict I and O,

daily weight.  Will continue to monitor.  Consider nephrology consultation if the

patient's kidney function does not improve.

 

5.  Type 2 diabetes.  Insulin dosage has been reduced.  Basal bolus insulin.

Followup fingersticks, adjust as needed.

 

6.  Coronary artery disease.  Continue aspirin, telemetry monitoring, cardiac

enzyme.

 

7.  Recent history of lower extremity cellulitis and abscess.  Dr. Saravia has

been consulted for wound care.  The patient has a wound VAC.  Dr. Banerjee has also

been consulted.  Antibiotics as per Dr. Banerjee.  Currently, the patient is on

Levaquin and Zyvox.  The patient will need Levaquin and Zyvox for 2 more weeks.

Outpatient followup with Dr. Banerjee.  Followup C-reactive protein.

 

8.  Peripheral neuropathy.  Continue home medication.

 

9.  Benign prostatic hypertrophy (BPH).  Continue home medication.

 

10.  Poor compliance, complicating care.

 

11.  Morbid obesity, complicating care.

 

12.  Deep vein thrombosis (DVT) prophylaxis.  Heparin subcutaneous.

 

DISPOSITION PLANNING:  Pending clinical improvement, physical therapy.  Wean FiO2

as tolerated.  Overall, poor prognosis.

## 2017-03-16 NOTE — CR
DATE OF CONSULTATION:  03/15/2017

 

Asked to consult by Dr. Rincon for evaluation of methicillin-resistant 
Staphylococcus

aureus (MRSA) osteomyelitis of the right foot and anasarca and lymphadenopathy.

 

HISTORY OF PRESENT ILLNESS:  Mr. Knowles is a pleasant 62-year-old gentleman,

well known to me from previous admission when he was admitted in December for

about 3 weeks with MRSA cellulitis and abscess of the right foot, which needed

debridement done by Dr. Saravia.  The patient had a prolonged course with

intravenous (IV) antibiotics.  He was discharged home on oral Zyvox but

progressively got worse.  He developed gangrene of the foot and was transferred

to [place] for revascularization.  Wound cultures from his foot while at 
[facility]

were positive for Pseudomonas, Escherichia (E.) coli, Enterococcus faecalis, and

MRSA on 2017.  He was placed on IV vancomycin, which was supposed to

continue for a total of 6 weeks, and oral levofloxacin.  The patient was sent

home with a wound vacuum-assisted closure (VAC), which has been changed three

times a week by nursing.  He is followed up by Dr. Saravia.  His IV 
antibiotics

were supposed to be done on 03/15/2017, which is a total of 6 weeks from his

hospitalization.  He was on vancomycin 1.25 grams daily, along with oral

Levaquin.  He came to the emergency room complaining of severe abdominal 
swelling

and lower extremity edema.  The patient denied having any fever, chills, nausea,

vomiting, or diarrhea.  He is actually more on the constipated side.  He stated

he had a previous history of fluid retention.  He was found to be hypoxic and 
has

been needing 6 liters of oxygen at a time.  The wound on the right lower

extremity is healing very well.  He has significant neuropathy; and, therefore,

does not feel any changes in the foot.  He was followed up by nursing, and they

were pretty satisfied with his improvement.

 

His past medical history is significant for MRSA recurrent infection, including

the right foot, as well as orchitis, status post orchiectomy, chronic kidney

disease, diabetes, coronary artery disease, hypertension, diabetic foot ulcer,

status post transmetatarsal amputation of 3rd to 5th toe.

 

ALLERGIES:  METFORMIN.

 

SURGICAL HISTORY:  Incision and drainage (I and D) of the right foot done by Dr. Saravia in 2016 with culture positive for MRSA, revascularization of

the right leg, and transmetatarsal amputation of 3rd to 5th toe done at [facility] by Dr. Olivo, percutaneous catheterization in 2009,

appendectomy, thyroidectomy, left testicular resection for MRSA infection.

 

FAMILY HISTORY:  Father  of colon cancer.  Mother  of myocardial

infarction.  Three sisters are healthy.

 

SOCIAL HISTORY:  He just moved back to [facility] from [place].  He is a

former smoker, quit about 6 months ago.  He is .

 

MEDICATIONS:

- Levemir 8 units subcutaneous twice a day

- aspirin 81 mg daily every day

- Flomax 0.4 mg by mouth daily

- furosemide 40 mg IV twice a day

- Isordil 10 mg by mouth every 8 hours

- gabapentin 300 mg by mouth three times a day

- Senokot one tablet by mouth twice a day

- ceftaroline 600 mg IV every 12 hours

 

LABORATORIES:

White count 6.2, hemoglobin 9.7, hematocrit 33.5, platelets 194.  White count on

admission was 5.7.

 

Sodium 140, potassium 4.7, chloride 102, bicarbonate 34, BUN 16, creatinine 1.52
,

glucose 97, calcium 7.4, CRP 3.48 which is down from 12.9 in December, .

 

 

Vancomycin trough 17.7.

 

ABG on 2017:  pH was 7.246, pCO2 73, pO2 42, O2 saturation 72%.

 

Followup ABG:  pH was 7.32, pCO2 59, O2 saturation 49.9.

 

IMAGING STUDIES:

Chest CT shows bilateral pleural effusion with compression atelectasis of the

lower lobe lung adjacent to the effusions with marked multiple mediastinal lymph

nodes.  A few of them are borderline enlarged, but the study is insensitive for

hilar adenopathy in the absence of IV contrast.

 

CT of the abdomen and pelvis also done with only bowel contrast and IV contrast

shows no ascites.  There is no diverticulosis or diverticulitis.  The appendix 
is

unremarkable.  There is edema throughout the subcutaneous fat and mesenteric 
fat.

Cholelithiasis without evidence of cholecystitis.  There is retroperitoneal

adenopathy.  Appendix is unremarkable.  The largest lymph node measures about 18

mm.

 

Abdominal x-ray shows bilateral pleural effusion and moderate cardiomegaly with

nonspecific bowel gas pattern.

 

Echocardiogram:  Mild concentric left ventricular hypertrophy (LVH) with

localized septal wall motion abnormality, believed to be related to right

ventricular pressure overload.  Preserved left ventricular function.  Moderate

dilated left atrium.  Prominent dilated right heart chambers with Doppler

evidence of moderately severe to severe pulmonary hypertension.  Prominently

dilated inferior vena cava with absent respiratory collapse consistent with

elevated central venous pressure and right-sided heart failure.  Subtle

degenerative changes of the mitral and aortic valvular apparatus without aortic

functional valvular abnormalities.

 

On physical examination, he is a healthy-looking gentleman, morbidly obese, in 
no

acute distress.

Afebrile.  Temperature is 98.2, pulse 93, respirations 20, blood pressure 135/67
,

oxygen (O2) saturation 93% on 6 liters nasal cannula.

Heart:  Normal S1, S2, distant.

Lungs:  Diminished breath sounds at the bases but no wheezes or rhonchi.

Abdomen:  Morbidly obese with no significant tenderness.  Pitting edema of the

abdominal wall.  No rebound.  No guarding.

Extremities:  Bilateral lower extremity edema +3.  Right lower extremity wound

vacuum-assisted closure (VAC) was removed.  There is an incision that goes

diagonally that is healing well.  He has transmetatarsal amputation of 3rd to 
5th

toe.  There is still an area of yellowish necrotic tissue at the bottom of the

5th metatarsal, but otherwise the wound looks very well.  The incision is 
healing

well.  There is no evidence of infection.  There is no purulent discharge.

 

PLAN:  This is a 62-year-old gentleman with insulin-dependent diabetes, morbid

obesity, hypertension, chronic kidney disease, who was admitted with bilateral

pleural effusion, abdominal wall edema, and has severe pulmonary hypertension.

The patient does not have evidence of worsening infection at this point.  His

echocardiogram is suggestive of severe right-sided heart failure.  It could be a

combination of untreated sleep apnea and chronic obstructive pulmonary disease

(COPD).  His acute osteomyelitis has markedly improved.  He has finished IV

vancomycin course on 03/15/2017; and, therefore, he will be switched to oral

antibiotics, including by mouth linezolid and Levaquin, which was the plan to

continue 2 more weeks of therapy.

 

Diffuse adenopathy.  These imaging studies were done without contrast

The patient does not have any significant B symptom to suggest lymphoma.

 

PLAN:

Discontinue IV ceftaroline and switch to by mouth Levaquin and linezolid, which

he will need for 2 more weeks.

 

As far as the adenopathy is concerned, I would suggest obtaining an outpatient 
CT

abdomen and pelvis if kidney function recovers with contrast to followup on

adenopathy in 3 months and obtaining an outpatient hematology/oncology

consultation.

 

As far as edema is concerned, the right-sided heart failure, the patient needs a

sleep study as soon as possible so he could be a set of for continuous positive

airway pressure (CPAP).

 

The case will be discussed with Dr. Saravia, who will be seeing him tomorrow.

 

The case has already been discussed with Dr. Wellington, who feels that at this point,

we should not undergo a biopsy.  There is no safe window without increasing risk

of complications and infection at this point and better to wait for followup CT

in 3 months.

AVELINO

## 2017-03-17 NOTE — IPN
DATE:  03/17/2017

 

Patient seen and examined. No acute events overnight. Reporting improved

respiration. Denies any chest pain, pressure, or discomfort. Patient reported

that he is not compliant with oxygen at home and will not be compliant. Patient

is aware that he has risk of sudden death due to respiratory failure due to

obstructive sleep apnea and heart failure. Will further discuss with patient.

Counseling provided.

 

VITAL SIGNS: Temperature 97.9, pulse 77, respirations 22, blood pressure 148/75,

pulse oximetry 95% on 4 liters nasal cannula.

 

LABORATORY DATA:

WBC 5.8, hemoglobin and hematocrit 9.1/31.8, platelets 166.

 

Chemistry: Sodium 142, potassium 3.7, chloride 107, bicarbonate 31, BUN 17,

creatinine 1.44, C-reactive protein 3.45.

 

PHYSICAL EXAMINATION:

GENERAL: Patient morbidly obese in no acute distress, hyperpigmented skin.

HEENT: Normocephalic, atraumatic.

PULMONARY: Distant breath sounds. Bibasilar rhonchi.

CARDIAC: Regular rate and rhythm. Normal S1, S2.

ABDOMEN: Lower abdomen anasarca. Soft, obese.  Distant bowel sounds. No rebound.

No guarding.

EXTREMITIES: Bilateral lower extremities 3+ edema. Right lower extremity dressing

clean, dry, and intact. Wound vacuum-assisted closure (VAC) in place.

Patient appears disheveled.

 

ASSESSMENT AND PLAN:

This is a 62-year-old male patient with underlying medical history of

insulin-dependent type 2 diabetes mellitus, morbid obesity, hypertension,

coronary artery disease, cellulitis, and abscess of right lower extremity,

recently treated by Dr. Banerjee and Dr. Saravia with drainage and wound VAC.

Presented with abdominal pain, anasarca, shortness of breath, hypoxic and

hypercarbic respiratory failure.

 

1.  Hypoxic and hypercarbic respiratory failure secondary to obesity

hypoventilation syndrome and pulmonary hypertension, likely secondary to

underlying obstructive sleep apnea. Echocardiogram appreciated, showing severe

right heart failure with pulmonary hypertension. Patient diuresed with Lasix.

Also had pleural effusion shown on CT scan. Will followup strict intake and

output, fluid restriction 1.8 liters, daily weight, telemetry monitoring. Serial

cardiac enzymes negative times two. Patient will need urgent sleep studies.

Pulmonology, Dr. Paul, has been consulted. Oxygen supplementation. Patient

reported compliance.

 

2.  Bilateral pleural effusion, likely secondary to congestive heart failure with

diastolic dysfunction with severe pulmonary hypertension and right heart failure.

Continue Lasix. Strict intake and output, daily weight. Lasix dose adjusted.

Pulmonary consulted.

 

3.  Diffuse lymphadenopathy. Case discussed with infectious disease, Dr. Banerjee.

Radiology image reviewed with Dr. Wellington. CT shows no good window for safe lymph

node excisional biopsy. Recommend outpatient followup in 3 months with repeat CT

scans and outpatient followup with hematology/oncology. Case discussed with Dr. Banerjee. Culture has been sent.

 

4.  Chronic kidney disease (CKD). Initially BUN and creatinine at baseline.

Currently mildly elevated due to diuresis. Lasix dose adjusted. Continue to

monitor. Strict intake and output, daily weight. Need outpatient nephrologist

consultation.

 

5.  Type 2 diabetes. Insulin dosage has been adjusted. Basal bolus insulin.

Follow fingersticks. Adjust as needed.

 

6.  Coronary artery disease. Continue aspirin, telemetry monitoring. Cardiac

enzymes appreciated.

 

7.  Recent history of lower extremity cellulitis and abscess. Dr. Saravia

consulted for wound care. Patient has a wound VAC. Dr. Banerjee has also been

consulted. Patient completed outpatient intravenous (IV) antibiotics, currently

on Levaquin and Zyvox for 2 more weeks as per Dr. Banerjee. Outpatient followup with

Dr. Banerjee.

 

8.  Peripheral neuropathy. Continue home medications.

 

9.  Benign prostatic hypertrophy (BPH). Continue home medications.

 

10.  Poor compliance complicating care.

 

11.  Morbid obesity complicating care.

 

12.  Deep vein thrombosis (DVT) prophylaxis. Heparin subcutaneous.

 

DISPOSITION PLANNING: Weaning FiO2. Pending clinical improvement, physical

therapy, and pulmonary consultation.

## 2017-03-17 NOTE — IPN
DATE: 03/16/2017

 

Mr. Knowles is doing better today.  He states his abdominal swelling has

diminished as well as lower extremity edema.  His shortness of breath has

improved.  He diuresed yesterday about 2.1 liters, negative balance, today 1.4

liters.  He is on fluid restriction 1800 mL per day.  He is complaining of being

hungry and not being given enough food.  He has no nausea, vomiting, diarrhea.

He has mild foot pain.  He has a wound VAC in place.

 

LABORATORY DATA: White count is 5.7, hemoglobin 9.6, hematocrit 32.2, platelets

162.  ESR 84.  Sodium 139, potassium 4.6, chloride 99, bicarb 35, BUN 19,

creatinine 1.7, glucose 92, calcium 7.5, magnesium 1.9, CRP 3.48.

 

On physical exam, temperature is 98.9, pulse 84, respirations 16, blood pressure

133/66, O2 sat 93% on six liters nasal cannula.  Heart:  Normal S1-S2, distant.

No murmurs appreciated.  Lungs:  Diminished breath sounds at the bases with few

right basilar rhonchi. Abdomen:  Morbidly obese, soft with pitting edema.

Extremities:  With +3 pitting edema. Right lower extremity with a wound VAC in

place.  Dressing was removed yesterday and the wound looks like it is healing

well with granulation tissue with three amputation transmetatarsal.

 

IMPRESSION:

1.  Hypoxic and hypercarbic respiratory failure secondary to obesity

hypoventilation syndrome with right-sided heart failure.  The patient needs to

have sleep study as well as he has sleep apnea but is untreated.

 

2.  Diffuse adenopathy involving the majesta area as well an intra-abdominal

area. The lymph nodes measure about 1.8 cm largest. The patient does not have any

B symptoms.  No fever, chills, night sweats or weight loss.  Dr. Wellington felt that

there was no good window to access a lymph node safely.  At this point, probably

an outpatient followup in three months would be recommended.  I would suggest

also referring him to hematology/oncology.

 

3.  Acute osteomyelitis of the foot status post six weeks of IV vancomycin and

oral Levaquin that was gangrenous, polymicrobial with cultures for MRSA,

Enterococcus faecalis, E.  Coli and Pseudomonas.  The patient will finish with

two weeks of levofloxacin and Zyvox.

 

PLAN:  Continue wound VAC.  Dr. Saravia will be seeing the dressing tomorrow.

Followup lymphadenopathy as an outpatient.  Continue diuresis.

## 2017-03-18 NOTE — IPN
DATE:  03/18/2017

 

Patient seen and examined.  Reported comfortable.  Denies any fevers, chills,

chest pain, pressure or discomfort.

 

VITAL SIGNS: Temperature 98.1, pulse 84, respirations 18, blood pressure 164/82,

pulse oximetry 92% on 4 liters nasal cannula.

 

LABORATORY DATA:  WBC 5.5, hemoglobin and hematocrit 9.1/30.1, platelets 146.

 

Chemistry:  Sodium 139, potassium 4.6, chloride 98, bicarbonate 35, BUN 20,

creatinine 1.8.

 

PHYSICAL EXAMINATION:

GENERAL:  Patient morbidly obese in no acute distress, hyperpigmented skin.

HEENT:  Normocephalic, atraumatic.

PULMONARY:  Distant breath sounds.  Bibasilar rhonchi.

CARDIAC:  Regular rate and rhythm.  Normal S1, S2.

ABDOMEN:  Lower abdomen anasarca.  Soft, obese.  Distant bowel sounds.  No

rebound.  No guarding.

EXTREMITIES:  Bilateral lower extremities with 3+ edema.  Right lower extremity

dressing clean, dry, and intact.  Wound vacuum-assisted closure (VAC) in place.

Patient disheveled.

 

ASSESSMENT AND PLAN:  This is a 62-year-old male patient with underlying medical

history of insulin-dependent type 2 diabetes mellitus, morbid obesity,

hypertension, coronary artery disease, cellulitis, and abscess recently of right

lower extremity treated by Dr. Banerjee and Dr. Saravia with drainage and wound VAC

who presented for abdominal pain, anasarca, shortness of breath, hypoxia and

hypercarbic respiratory failure.

 

PROBLEMS:

1.  Hypoxic and hypercarbic respiratory failure secondary to obesity

hypoventilation syndrome and pulmonary hypertension, with questionable underlying

obstructive sleep apnea.  Echocardiogram appreciated and shows severe right heart

failure and pulmonary hypertension.  Patient was diuresed with Lasix and fluid

restriction.  CT showed pleural effusion.  Strict intake and output.  Fluid

restriction 1.8 liters.  Daily weight.  Telemetry monitoring appreciated.  Serial

cardiac enzymes negative times two.  Pulmonology, Dr. Paul, consulted.

Recommended repeating echocardiogram after the patient is sufficiently diuresed.

Oxygen supplementation.  Patient reported noncompliance to oxygen at home.

2.  Bilateral pleural effusion, likely secondary to congestive heart failure with

diastolic dysfunction, severe pulmonary hypertension and right heart failure.

Continue Lasix.  Strict intake and output.  Daily weight.  Lasix dose has been

adjusted.  Pulmonary has been consulted.

3.  Diffuse lymphadenopathy.  Case discussed with infectious disease, Dr. Banerjee.

Radiology image reviewed with Dr. Wellington.  CT shows no good window for safe lymph

node excisional biopsy.  Recommend outpatient followup in 3 months and repeat CT

scan and outpatient followup with hematology/oncology.   Referral was made.  Case

discussed with Dr. Banerjee.  Culture was sent.

4.  Chronic kidney disease (CKD).  Initially BUN and creatinine at baseline.

Currently mildly elevated due to diuresis.  Lasix has been adjusted.  Continue to

monitor.  Strict intake and output.  Daily weight.  Need outpatient follow up

nephrology.

5.  Type 2 diabetes.  Insulin dosage has been adjusted.  Basal bolus insulin.

Follow fingersticks.  Adjust as needed.

6.  Coronary artery disease.  Continue aspirin, telemetry monitoring.  Cardiac

enzymes appreciated.

7.  Recent history of right lower extremity cellulitis and abscess.  Dr. Saravia consulted for wound care. Patient has a wound VAC.  Dr. Banerjee has also

been consulted.  Patient completed outpatient intravenous (IV) antibiotics,

currently on Levaquin and Zyvox.  Dr. Banerjee stated needs two more weeks.

Outpatient followup with Dr. Banerjee.

8.  Peripheral neuropathy.  Continue home medications.

9.  Benign prostatic hypertrophy (BPH).  Continue home medications.

10.  Poor compliance complicating care.  Not using oxygen at home.

11.  Morbid obesity complicating care.

12.  Deep vein thrombosis (DVT) prophylaxis.  Heparin subcutaneous.

 

DISPOSITION PLANNING:  Weaning FiO2, pending clinical improvement, physical

therapy, and pulmonary consultation.

## 2017-03-18 NOTE — IPN
DATE: 03/17/2017

 

SUBJECTIVE:

Mr. Knowles seems to be doing very well.  His shortness of breath has improved as

well as the abdominal bloating and swelling.  He has no fever or chills.  No

nausea, vomiting or diarrhea.  Wound vacuum-assisted closure (VAC) was changed

today with Dr. Saravia.  The wound looks great. The diagonal incision is

practically healed. Transmetatarsal amputation is healing well.  He has had no

surrounding cellulitis or purulent discharge.  He does have some maceration of

the skin from leaking around the wound VAC.

 

OBJECTIVE:

VITAL SIGNS: Temperature is 99.8, pulse 89, respirations 20, blood pressure

153/74, oxygen saturation 91% on five liters nasal cannula.

HEART:  Normal S1, S2.  No murmurs, rubs or gallops.

LUNGS:  Clear.  Diminished breath sounds at the bases.

ABDOMEN:  Morbidly obese, soft, nontender.  Decreased subcutaneous edema.

EXTREMITIES:  +2 pitting edema right foot.  No evidence of infection. Third,

fourth, fifth transmetatarsal amputation healing well.

 

LABORATORY DATA:

White count 5.8, hemoglobin 9.1, hematocrit 31.8, platelets 166.  Sodium 142,

potassium 3.7, chloride 107, bicarb 31, BUN 17, creatinine 1.4, glucose 73,

calcium 6.2, magnesium 1.4.

 

IMPRESSION:

1. Right-sided heart failure on intravenous (IV) Lasix, doing much better.  He

has a total of four liters negative fluid balance in the past three days and has

lost about four kg.

2.  Acute osteomyelitis and gangrene of the right foot polymicrobial infection on

oral  Levaquin and Zyvox.  The patient will need a total of two more weeks.  He

is currently day two.

3. Diffuse lymphadenopathy will need to be followed up as an outpatient. Probably

the patient would get a CT abdomen and pelvis, chest x-ray, and followup with

contrast once his kidney function has improved.

 

PLAN:

Continue oral Levaquin and Zyvox for a total of 14 days. End of treatment would

be 03/29.  Please discontinue the peripherally inserted central catheter (PICC)

line and use a peripheral IV as the patient is at high risk of line infection.

This line has been in for close to two months and the patient probably has good

peripheral IV to be used.  Infectious disease signing off.

## 2017-03-18 NOTE — IPN
DATE:  03/17/2017

 

CHIEF COMPLAINT:  Patient seen today for evaluation of an ulceration on his right

foot.  He has a wound Vac in place.  The patient is seen today at bedside.

 

The wound Vac was removed from the patient's right foot.  There was a guillotine

type amputation through the fifth, fourth and third toes.  Good granulation

tissue was noted at this ulcer site.  There was an ulceration on the plantar

lateral surface of the foot which has mostly yellow type granulation tissue with

minimal red sporadic granulation tissue.  The central space dehiscence area is

fully granulating in without discharge.

 

ASSESSMENT:  Stage III ulcerations as described.

 

PLAN:  A wound Vac was placed on the patient's right lower extremity with

appropriate windowing with black foam.  The pressure was increased to 175 mmHg

high intensity, continuous pressure.  We discussed with the patient when the

granulation tissue completely goes through the plantar ulcer he could be switched

off Vac therapy.  His questions were answered.

## 2017-03-18 NOTE — CR
DATE OF CONSULTATION: 03/17/2017

 

PULMONARY SLEEP SERVICE

 

REASON FOR CONSULTATION:

The patient is a 62-year-old  male seen for evaluation of respiratory

status and in light of his concerns over his sleep. In regard to obstructive

sleep apnea syndrome, he is known snorer. It is unclear as to whether he has

irregularities in breathing during sleep.  He follows a quite irregular sleep

schedule.  He denies morning headaches.  Has had no esophageal reflux symptoms.

He was to be evaluated for sleep apnea syndrome in [place] prior to his

relocation to [facility] in the fall of 2016, but never had this testing

done.

 

In regard to his respiratory status, though be denies active respiratory

symptoms, he is quite limited in regard to his activity by his foot and leg

problems rather than by breathing.  He does have a 30 pack-year smoking history.

No history of recurring bronchopulmonary infections. No history of chest trauma.

No significant occupational risks were identified. He did suffer mediastinitis

for unclear reasons but required a  drainage procedure remotely. The infection

was methicillin-resistant Staphylococcus aureus (MRSA). He has no history of

overwhelming smoke or fume exposures.  No tuberculosis exposures. He has never

suffered a deep venous thrombosis (DVT) nor pulmonary embolism that he is aware

of. He quit smoking in April 2016.

 

PAST MEDICAL HISTORY:

His past medical history includes diabetes mellitus, coronary artery disease,

obesity, cellulitis of his right foot.  He is post surgery and has a wound

vacuum-assisted closure (VAC) in place.

 

PAST SURGERIES:

Include the operations to resect some of his toes of the right foot,

appendectomy, mediastinal drainage for MRSA, and left orchiectomy for MRSA.

 

PERSONAL AND SOCIAL HISTORY

A 30 pack-year smoker; he quit in April 2016.  He has past alcohol use history

but is not currently drinking and has a supportive local family.

 

FAMILY HISTORY:

Includes primarily coronary artery disease.

 

REVIEW OF SYSTEMS:

CONSTITUTIONAL: He notes no significant change in appetite. His weight varies

significantly with fluids status.

HEENT:  He has no impairment of hearing, smell or taste.

CARDIOVASCULAR:  Prior history of coronary artery disease.  He has never 
suffered

a acute myocardial infarction that he is aware of.  He is not follow with

cardiology

PULMONARY:  See above.

GASTROINTESTINAL (GI):  There is no history of hepatitis, pancreatitis or

gallbladder disease.

GENITOURINARY ():  No frequency, dysuria, kidney stone history.

ENDOCRINE: He is a diabetic, follows fingerstick blood sugars at home in the

range of 100-120, usually. He had a procedure to his thyroid in the past.

NEUROLOGIC: There is no history of stroke or seizures.

MUSCULOSKELETAL:  He has muscle aches and cramps.

 

PHYSICAL EXAMINATION:

VITAL SIGNS: His temperature is 97, pulse rate 77, respirations 18, blood

pressure 148/75.

HEENT:  Oral and nasal mucosa are pink.  His posterior pharynx is shallow.

Mallampati class III.

NECK: Neck is stout.  Jugular veins are unable to be appreciated.  Carotid

upstroke is palpable.

HEART:  Sounds are regular, somewhat distant , no appreciable murmur.

LUNGS:  Breath sounds diminished particularly in the bases, and coarse clear.

Chest is symmetric.  Moves symmetrically.  There is no accessory muscle patient

muscle use.  The patient is resting comfortably in bed.

ABDOMEN: Soft and obese.

EXTREMITIES:  Show gross edema bilaterally. He has wound VAC on his right foot

and it is dressed.

 

LABORATORY DATA:

Diagnostic laboratory studies and chest x-rays were reviewed. His arterial blood

gases did show hypercarbia on admission.

 

IMPRESSION:

1.  Sleep apnea syndrome symptoms.

2.  Chronic obstructive pulmonary disease suspected; a 30 pack-year smoking

history, quit in 2016.

3. Pulmonary hypertension per echocardiogram.

 

RECOMMENDATIONS:

1. Will arrange for a nocturnal oximetry to assess pattern and if a significant

pattern of  variable desaturations is identified, formal sleep testing will be

arranged on outpatient basis.

2. Will obtain a bedside spirogram to assess degree of airway airflow 
obstruction

and to  determine if bronchodilator therapy would be helpful.  Pending this

result, the patient may benefit from more thorough complete pulmonary function

testing on an outpatient basis.

 

Thank you for allowing me to consult in the care of this patient. If there are

questions, please do not hesitate to contact me.

Seen in

 

HISTORY OF PRESENT ILLNESS

Dictated

MTDD

## 2017-03-19 NOTE — IPN
DATE:  03/19/2017

 

Patient seen and examined.  No acute events overnight.  Reported comfortable.

Abdominal to be improving and lower extremity swelling has also been improving.

Denies any fevers, chills, chest pain, pressure or discomfort.  Reported

respirations returning to baseline, but still requires oxygen supplementation.

 

VITAL SIGNS: Temperature 99.3, pulse 78, respirations 20, blood pressure 136/78,

pulse oximetry 90% on 4 liters FiO2.

 

LABORATORY DATA:  WBC 5, hemoglobin and hematocrit 9.3/33.5, platelets 177.

 

Chemistries:  Sodium 139, potassium 4.7, chloride 99, bicarbonate 38, BUN 22,

creatinine 1.76.

 

PHYSICAL EXAMINATION:

GENERAL:  Patient morbidly obese in no acute distress, hyperpigmented skin.

HEENT:  Normocephalic, atraumatic.

PULMONARY:  Distant breath sounds.  Diminished breath bilateral bases. CARDIAC:

Regular rate and rhythm.  Normal S1 and S2.

ABDOMEN:  Soft, obese.  Lower abdomen anasarca much improved.  Distant bowel

sounds.  No rebound.  No guarding.

EXTREMITIES:  Bilateral lower extremities with 2+ edema.  Right lower extremity

dressing clean, dry, and intact.  Wound VAC in place. Patient disheveled.

 

ASSESSMENT AND PLAN:  This is a 62-year-old male patient with underlying medical

history of morbid obesity, insulin-dependent type 2 diabetes, hypertension,

coronary artery disease, cellulitis, and abscess recently of right lower

extremity treated by Dr. Banerjee and Dr. Saravia with drainage and wound VAC in

place who presented for abdominal pain and anasarca, shortness of breath, 
pleural

effusion, hypoxia and hypercarbic respiratory failure.

 

PROBLEMS:

1.  Hypoxic hypercarbic respiratory failure secondary to obesity hypoventilation

syndrome, pulmonary hypertension, and pleural effusion, questionable underlying

obstructive sleep apnea.  Echocardiogram appreciated.  Shows severe right heart

failure and pulmonary hypertension.  Patient was diuresed with Lasix.  Fluid

restriction of 1.8 liters.  CT showed pleural effusion.  Strict intake and

output, and daily weight.  Telemetry initially appreciated.  Cardiac enzymes

negative times two.  Pulmonology, Dr. Paul, has been consulted. Nocturnal

oximetry appreciated.  As per Dr. Paul, recommend repeating echocardiogram

once the patient is sufficiently diuresed.  Oxygen supplementation.  Patient

reported noncompliance with oxygen at home.

 

2. Bilateral pleural effusion likely seecondary to decompensated congestive 
heart failure

 severe pulmonary hypertension and right heart failure.

Continue Lasix.  Strict intake and output.  Daily weights.  Lasix dose has been

adjusted.  Pulmonary has been consulted.

 

3.  Diffuse lymphadenopathy.  Case discussed with infectious disease, Dr. Banerjee.

Radiology image reviewed with Dr. Wellington.  CT scan shows no good window for safe

lymph node excisional biopsy.  Recommend outpatient followup in 3 months for

repeat CT scan and outpatient followup with hematology/oncology.   Referral was

made.  Case discussed with Dr. Banerjee.  Cultures were sent.

 

4.  Chronic kidney disease (CKD).  Initially BUN and creatinine at baseline.

Currently mildly elevated secondary to diuretics.  The Lasix dose has been

adjusted.  Continue to monitor.  Strict intake and output.  Daily weight.

Outpatient follow up nephrology.

 

5.  Type 2 diabetes.  Insulin dosage has been adjusted.  Basal bolus insulin.

Follow fingersticks.  Adjust as needed.

 

6.  Coronary artery disease.  Continue aspirin.  Cardiac enzymes appreciated.

Telemetry monitoring initially done.

 

7.  Recent history of right lower extremity cellulitis and abscess.  Dr. Saravia has been consulted for wound care.  Patient has a wound VAC.  Dr. Banerjee

has also been consulted.  Patient completed outpatient IV antibiotics, currently

on Levaquin and Zyvox.  Dr. Banerjee recommended two more weeks of oral antibiotics

and outpatient followup with Dr. Banerjee.

 

8.  Peripheral neuropathy.  Continue home medications.

 

9.  Benign prostatic hypertrophy (BPH).  Continue home medications.

 

10.  Poor compliance complicating care.  Not using home oxygen.

 

11.  Morbid obesity complicating care.

 

12.  Deep vein thrombosis (DVT) prophylaxis.  Heparin subcutaneous.

 

DISPOSITION PLANNING:  Wean FiO2 pending clinical improvement, physical therapy,

and final pulmonary recommendation.  May have to repeat echocardiogram.

[facility]

## 2017-03-20 NOTE — IPNPDOC
Subjective


Date Seen


The patient was seen on 3/20/17.





Subjective


Chief Complaint/HPI


The patient is a 62-year-old male admitted with a reason for visit of Pleural 

Effusion.


Events since last encounter


pt seen and examined,


Pulmonary:  Denies: Cough, Dyspnea


Cardiovascular:  Denies: Chest Pain, Lt Headedness, Orthopnea, Palpitations, 

Paroxysmal Noc. Dyspnea


Musculoskeletal:  Reports: Foot Pain





Objective


Physical Examination


General Exam:  Positive: Alert


ENT Exam:  Positive: Atraumatic, Mucous membr. moist/pink, Pharynx Normal


Chest Exam:  Positive: Clear to auscultation, Normal air movement


Abdomen Exam:  Positive: Normal bowel sounds, Soft, 


   Negative: Hepatospenomegaly, Tenderness


Extremity Exam:  Positive: Normal pulses, 


   Negative: Clubbing, Cyanosis, Edema





Assessment /Plan


Problems





(1) Cellulitis


Status:  Acute


Problem Text:  


* wound vac in place


* will need 2 weeks of antibiotics of levaquin and zyvox, per dr malcolm's note


* follow up with dr malcolm and dr ortez





(2) Pleural effusion


Status:  Acute


(3) CHF exacerbation


Status:  Acute


Problem Text:  


* right sided heart failure


* continue to diurese with IV lasix


* I/O and daily weight





(4) HTN (hypertension)


Status:  Chronic


(5) COPD (chronic obstructive pulmonary disease)


Status:  Chronic


Response to Treatment:  Stable








Plan/VTE


VTE Prophylaxis Ordered?:  Yes





VS, I&O, 24H, Fishbone


Vital Signs/I&O





 Vital Signs








  Date Time  Temp Pulse Resp B/P Pulse Ox O2 Delivery O2 Flow Rate FiO2


 


3/20/17 16:01   18     


 


3/20/17 14:00 98.9 86  131/62 90 Nasal Cannula 4.0 














 I&O- Last 24 Hours up to 6 AM 


 


 3/20/17





 06:00


 


Intake Total 800 ml


 


Output Total 3375 ml


 


Balance -2575 ml











Laboratory Data


24H LABS


 Laboratory Tests 2


3/19/17 21:11: Bedside Glucose (Misc Panel) 134H


3/20/17 05:48: 


Anion Gap 4L, Blood Urea Nitrogen 25H, Creatinine 1.79H, Sodium Level 139, 

Potassium Level 4.8, Chloride Level 96L, Carbon Dioxide Level 39H, Calcium 

Level 7.9L, Glomerular Filtration Rate 41.4L, Magnesium Level 2.0


3/20/17 12:19: Bedside Glucose (Misc Panel) 115


3/20/17 17:12: Bedside Glucose (Misc Panel) 124H


CBC/BMP


 Laboratory Tests


3/20/17 05:48








Calcium Level 7.9 L, Red Blood Count 3.56 L, Mean Corpuscular Volume 84.3, Mean 

Corpuscular Hemoglobin 24.9 L, Mean Corpuscular Hemoglobin Concent 29.6 L, Red 

Cell Distribution Width 15.3 H








BAKARI RAMOS DO Mar 20, 2017 18:29

## 2017-03-20 NOTE — BSSPIR
DATE OF PROCEDURE:  03/17/2017

 

Spirometry shows a reduced forced vital capacity.  The FEV-1 is likewise low.

FEV1/FVC ratio was normal.  The flow volume curve was hyperacute in the excretory

limb.

 

IMPRESSION:

Nonspecific flow rate reduction.  Restrictive spirometric pattern.

## 2017-03-20 NOTE — NOCOX
DATE OF PROCEDURE:  03/18/2017

 

Nocturnal recording oximetry was performed on 5 liters of oxygen via nasal

cannula.  Baseline oxygen saturation was 96%.  Lowest oxygen saturation reliably

recorded 88%.  There was minimal variability.

 

IMPRESSION:  Adequate nocturnal oxygen saturation on 5 liters per minute via

nasal cannula with no significant patterning to suggest obstructive sleep apnea

syndrome.

## 2017-03-21 NOTE — IPNPDOC
Subjective


Date Seen


The patient was seen on 3/21/17.





Subjective


Chief Complaint/HPI


The patient is a 62-year-old male admitted with a reason for visit of Pleural 

Effusion.


Events since last encounter


no complaints today still requiring oxygen.





Objective


Physical Examination


General Exam:  Positive: Alert


ENT Exam:  Positive: Atraumatic, Mucous membr. moist/pink, Pharynx Normal


Chest Exam:  Positive: Clear to auscultation, Normal air movement


Abdomen Exam:  Positive: Normal bowel sounds, Soft, 


   Negative: Hepatospenomegaly, Tenderness


Extremity Exam:  Positive: Normal pulses, 


   Negative: Clubbing, Cyanosis, Edema





Assessment /Plan


Problems





(1) Acute respiratory failure with hypoxia and hypercarbia


Status:  Acute


Problem Text:  probably has obesity hypoventilation and sleep apnea. May also 

have copd we do not have any pulmonary function tests. 


CT chest did not show any  emphysema or chronic bronchitis. 


will need sleep studies as outpatient. 





(2) Diastolic CHF, acute on chronic


Status:  Acute


Problem Text:  will continue with diuresis. 





(3) Right heart failure due to pulmonary hypertension


Status:  Chronic


Problem Text:  has severe pulmonary hypertension due to chronic lung disease. 





(4) Cellulitis


Status:  Acute


Problem Text:  


* wound vac in place


* will need 2 weeks of antibiotics of levaquin and zyvox, per dr malcolm's note


* follow up with dr malcolm and dr ortez





(5) Pleural effusion


Status:  Acute


Response to Treatment:  Improving


Problem Text:  due to CHF exacerbation 





(6) HTN (hypertension)


Status:  Chronic


(7) COPD (chronic obstructive pulmonary disease)


Status:  Chronic


Response to Treatment:  Stable


Problem Text:  will give albuterol and ipratropium nebulizations. 





(8) Pulmonary HTN


Status:  Chronic


Response to Treatment:  Stable





(9) CKD (chronic kidney disease), stage III


Status:  Chronic


Response to Treatment:  Stable





(10) Neuropathy


Status:  Chronic


(11) Morbid obesity


Status:  Chronic


(12) Diabetes


Status:  Chronic





Plan/VTE


VTE Prophylaxis Ordered?:  Yes





VS, I&O, 24H, Fishbone


Vital Signs/I&O





 Vital Signs








  Date Time  Temp Pulse Resp B/P Pulse Ox O2 Delivery O2 Flow Rate FiO2


 


3/21/17 10:24      Nasal Cannula 4.0 


 


3/21/17 06:33    150/86    


 


3/21/17 06:00 98.7 83 20  94   














 I&O- Last 24 Hours up to 6 AM 


 


 3/21/17





 06:00


 


Intake Total 1620 ml


 


Output Total 2900 ml


 


Balance -1280 ml











Laboratory Data


24H LABS


 Laboratory Tests 2


3/20/17 12:19: Bedside Glucose (Misc Panel) 115


3/20/17 17:12: Bedside Glucose (Misc Panel) 124H


3/20/17 20:59: Bedside Glucose (Misc Panel) 103


3/21/17 06:38: 


Anion Gap 3L, Blood Urea Nitrogen 25H, Creatinine 1.75H, Sodium Level 137, 

Potassium Level 4.5, Chloride Level 95L, Carbon Dioxide Level 39H, Calcium 

Level 8.7L, Glomerular Filtration Rate 42.5L, Magnesium Level 2.2


CBC/BMP


 Laboratory Tests


3/21/17 06:38








Calcium Level 8.7 L, Red Blood Count 3.95 L, Mean Corpuscular Volume 83.9, Mean 

Corpuscular Hemoglobin 24.8 L, Mean Corpuscular Hemoglobin Concent 29.6 L, Red 

Cell Distribution Width 15.2 H








SALINAS BEGUM MD Mar 21, 2017 11:46

## 2017-03-23 NOTE — DSES
DATE OF ADMISSION:  03/14/2017

DATE OF DISCHARGE:  03/22/2017

 

PRIMARY CARE PROVIDER:  [facility], Dr. Malin

 

DISCHARGE DIAGNOSES:

1.  Acute respiratory failure with hypoxia and hypercarbia.

2.  Morbid obesity.

3.  Possibly obesity hypoventilation.

4.  Possibly sleep apnea.  Needs outpatient sleep studies.

5.  Diastolic congestive heart failure, acute and chronic.

6.  Severe pulmonary hypertension with cor. pulmonale.

7.  Bilateral pleural effusion due to congestive heart failure (CHF)

exacerbation, improving.

8.  Hypertension.

9.  Chronic obstructive pulmonary disease (COPD).

10.  Chronic kidney disease (CKD) stage III.

11.  Diabetes.

12.  Diabetic neuropathy.

13.  Chronic diabetic foot ulcer with cellulitis, status post surgery in February 2016.  Has wound vacuum-assisted closure (VAC) in place, status post peripheral

arterial disease, status post amputation of 3rd to 5th toes in the beginning of

February at [facility] with gangrene.  Wound VAC was placed then.

14.  Coronary artery disease, status post percutaneous coronary intervention

(PCI) in 2009.

15.  History of left testicular resection.

16.  Deep-seated right leg tissue infection with incision and drainage (I and D)

in December 2016.

17.  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

 

DISCHARGE MEDICATIONS:

- linezolid 600 mg by mouth twice a day

- levofloxacin 500 mg by mouth daily

- Lasix 80 mg by mouth daily

- Levemir insulin 8 units twice a day

- isosorbide dinitrate 10 mg by mouth every 8 hours

- tramadol 50 mg by mouth every 8 hours as needed pain

- Tylenol 1000 mg by mouth daily

- aspirin 81 mg by mouth daily

- gabapentin 300 mg by mouth three times a day

- nitroglycerin 0.4 mg sublingual as needed chest pain

- Flomax one capsule by mouth daily

 

ADDITIONAL DISCHARGE DIAGNOSES:

1.  Diffuse lymphadenopathy.  Recommended followup CT in 3 months and followup

with hematology/oncology.  A referral has been given.

2.  Benign prostatic hypertrophy (BPH).

3.  Bilateral lymphedema from chronic edema.

4.  Chronic respiratory failure with hypoxia, noncompliance with oxygen at home.

 

5.  Restrictive lung disease with hypoxia and hypercarbia.

 

HOSPITAL COURSE:  This is a 62-year-old male who was recently hospitalized at [facility] for gangrene of the right lower extremity and had 3rd to 5th

toe removals and deep tissue incision and drainage on 01/31/2017 and discharged

with a wound VAC and intravenous (IV) antibiotics home.  Presented to our

hospital with abdominal distention, lower extremity edema, and shortness of

breath.  The patient was found to have hypoxic hypercarbic acute respiratory

failure with bilateral pleural effusions suggestive of congestive heart failure.

The patient was started on oxygen supplementation and vigorous diuresis with

improvement in his shortness of breath.  Subsequent echocardiogram showed that

the patient has acute diastolic heart failure, as well as severe pulmonary

hypertension and cor. pulmonale.  Diuresis was continued.  The patient was seen

by pulmonology and by infectious disease in the hospital, and the patient has had

a nocturnal oximetry done, which did not show any obstructive sleep apnea

syndrome, however, did require 5 liters of oxygen to maintain saturation.  He

then had a bedside spirometry done, which was suggestive of restrictive

spirometric pattern.  As per infectious disease, the patient finished his IV

antibiotic course and was changed to by mouth Levaquin and Zyvox to continue the

treatment for acute osteomyelitis and gangrene for a total of 2 more weeks from

03/15/2017.  He had a CT scan of abdomen and pelvis and chest, which showed

diffuse lymphadenopathy, which needs to be followed up as an outpatient.  There

was no good window available for a lymph node biopsy.  Radiology suggested

treatment followup and also if possible to get a CT chest with contrast if his

renal functions improve and also a referral to hematology/oncology.  The

patient's peripherally inserted central catheter (PICC) line, which has been

there for almost 2 months, was taken out.  The patient was also seen by Dr. Saravia from podiatry, and the patient was instructed to followup with him as

an outpatient.  At present, the patient is functioning at his baseline.  Symptoms

have improved.  His vital signs are stable.  The patient has been cleared by

physical therapy to discharge home with his current level of care.  So, the

patient is going to be discharged home.

 

PHYSICAL EXAMINATION:

VITAL SIGNS:  Pulse 82, respiratory rate 18, blood pressure 130/70, pulse

oximetry 95% with 2 liters nasal cannula.

GENERAL:  The patient awake, alert, oriented times three.  Sitting up in bed.  In

no acute distress.

HEENT:  Normocephalic, atraumatic.  Moist mucous membranes.  Anicteric eyes.

CHEST:  Clear to auscultation.

CARDIOVASCULAR:  S1, S2, regular.  No rub, murmur, or gallop.

ABDOMEN:  Obese, soft, nontender.  Bowel sounds present.

EXTREMITIES:  Bipedal 3-4+ edema with lymphedematous changes.  There is a wound

VAC in place on the right foot.

 

LABORATORY DATA:

WBC 5.5, hemoglobin 9.8, platelet 176.

 

Sodium 137, potassium 4.5, chloride 95, bicarbonate 39, BUN 25, creatinine 1.75,

glucose 82, magnesium 2.2, calcium 8.7.

 

CT abdomen and pelvis showed cholelithiasis with gout and acute cholecystitis,

retroperitoneal lymphadenopathy.  No ascites.  There is enlargement of

subcutaneous tissue edema, as well as edema in the mesenteric fat.  There is a 15

mm cutaneous nodule inferiorly in the pelvis on the right extending into the deep

subcutaneous fat.

 

CT chest showed bilateral pleural effusions with compression atelectasis of the

lower lung fields with effusions.  Multiple mediastinal lymph nodes.

 

DISPOSITION:  The patient is discharged home with home services.

 

DISCHARGE INSTRUCTIONS:  The patient to followup with primary care provider in 1

week.  The patient to followup with Dr. Banerjee in 1-2 weeks.  The patient to

followup with Dr. Paul in 2 weeks.  The patient to followup with Dr. Saravia

in 1-2 weeks.  The patient would benefit from a referral to hematology/oncology

for diffuse lymphadenopathy.  The patient will also need a followup CT scan of

the chest and abdomen to evaluate the lymphadenopathy in 3 months.

Carbohydrate-consistent, 2-gram-sodium diet.  Fluid restriction 1800 mL per 24

hours.  Activity as tolerated .

## 2017-05-18 NOTE — RO
DATE OF PROCEDURE:  05/17/2017

 

PREPROCEDURE DIAGNOSIS: Osteomyelitis plantar aspect right foot.

 

POSTPROCEDURE DIAGNOSIS:  Osteomyelitis planter aspect right foot.

 

PROCEDURE:  Debridement of osteomyelitic bone with fifth metatarsal corticotomy

right foot with application of wound Vac.

 

SURGEON: Dr. Dameon Saravia DPM

 

ASSISTANT: None.

 

ANESTHESIA: Local MAC.

 

IRRIGATION: Lower pressure lavage system with Unasyn.

 

DESCRIPTION OF OPERATION:  On 05/17/2017, this 62-year old white male was taken

from his hospital room to the operating room and placed on the operating table in

supine position. Following induction of IV sedation, local and regional

anesthesia, the right lower extremity was prepped and draped in the usual aseptic

manner. Evaluation of the right foot revealed a large area of necrotic tissue on

the plantar lateral aspect of the right foot overlying the plantar aspect of the

heel.  Utilizing a sterile scalpel, the ulcer was debrided down to the level of

the bone.  All the devitalized tissue including muscle and tendinous structures

were debrided.  The fifth metatarsal had an area of necrotic bone and utilizing

an osteotome and mallet, a corticotomy was performed and debridement of the fifth

metatarsal.  This was sent for aerobic and anaerobic cultures.  The wound

measured 14.6 cm from distal to proximal, 5.1 cm from medial to lateral, and 0.5

cm in depth to the level of the bone.  Utilizing a low pressure pulse lavage

system, the wound was copiously irrigated with Unasyn.  A wide foam black

composite dressing was applied over the wound at negative 175 mmHg high intensity

continuous pressure.  Patient apparently having tolerated the procedure well was

taken from the operating room to the recovery room with vital signs stable,

patient afebrile for further monitoring by the anesthesia department. Aerobic and

anaerobic cultures were obtained from the bone.  His questions are answered.

## 2017-07-05 ENCOUNTER — HOSPITAL ENCOUNTER (OUTPATIENT)
Dept: HOSPITAL 53 - M LAB REF | Age: 62
End: 2017-07-05
Attending: INTERNAL MEDICINE
Payer: MEDICAID

## 2017-07-05 DIAGNOSIS — I96: Primary | ICD-10-CM

## 2017-07-05 LAB
ANION GAP SERPL CALC-SCNC: 5 MEQ/L (ref 8–16)
BASOPHILS # BLD AUTO: 0.1 K/MM3 (ref 0–0.2)
BASOPHILS NFR BLD AUTO: 1 % (ref 0–1)
BUN SERPL-MCNC: 16 MG/DL (ref 7–18)
CALCIUM SERPL-MCNC: 8.1 MG/DL (ref 8.8–10.2)
CHLORIDE SERPL-SCNC: 104 MEQ/L (ref 98–107)
CO2 SERPL-SCNC: 30 MEQ/L (ref 21–32)
CREAT SERPL-MCNC: 1.38 MG/DL (ref 0.7–1.3)
EOSINOPHIL # BLD AUTO: 0.5 K/MM3 (ref 0–0.5)
EOSINOPHIL NFR BLD AUTO: 7.1 % (ref 0–3)
ERYTHROCYTE [DISTWIDTH] IN BLOOD BY AUTOMATED COUNT: 17.4 % (ref 11.5–14.5)
GFR SERPL CREATININE-BSD FRML MDRD: 56 ML/MIN/{1.73_M2} (ref 49–?)
GLUCOSE SERPL-MCNC: 109 MG/DL (ref 80–110)
LARGE UNSTAINED CELL #: 0.1 K/MM3 (ref 0–0.4)
LARGE UNSTAINED CELL %: 1.9 % (ref 0–4)
LYMPHOCYTES # BLD AUTO: 1.7 K/MM3 (ref 1.5–4.5)
LYMPHOCYTES NFR BLD AUTO: 23.3 % (ref 24–44)
MCH RBC QN AUTO: 27.1 PG (ref 27–33)
MCHC RBC AUTO-ENTMCNC: 32 G/DL (ref 32–36.5)
MCV RBC AUTO: 84.8 FL (ref 80–96)
MONOCYTES # BLD AUTO: 0.5 K/MM3 (ref 0–0.8)
MONOCYTES NFR BLD AUTO: 6.4 % (ref 0–5)
NEUTROPHILS # BLD AUTO: 4.3 K/MM3 (ref 1.8–7.7)
NEUTROPHILS NFR BLD AUTO: 60.3 % (ref 36–66)
PLATELET # BLD AUTO: 246 K/MM3 (ref 150–450)
POTASSIUM SERPL-SCNC: 4.3 MEQ/L (ref 3.5–5.1)
SODIUM SERPL-SCNC: 139 MEQ/L (ref 136–145)
WBC # BLD AUTO: 7.1 K/MM3 (ref 4–10)

## 2017-07-19 ENCOUNTER — HOSPITAL ENCOUNTER (OUTPATIENT)
Dept: HOSPITAL 53 - M LAB REF | Age: 62
End: 2017-07-19
Attending: INTERNAL MEDICINE
Payer: MEDICARE

## 2017-07-19 DIAGNOSIS — E78.00: ICD-10-CM

## 2017-07-19 DIAGNOSIS — I96: Primary | ICD-10-CM

## 2017-07-19 LAB
ANION GAP SERPL CALC-SCNC: 4 MEQ/L (ref 8–16)
BASOPHILS # BLD AUTO: 0 K/MM3 (ref 0–0.2)
BASOPHILS NFR BLD AUTO: 0.8 % (ref 0–1)
BUN SERPL-MCNC: 18 MG/DL (ref 7–18)
CALCIUM SERPL-MCNC: 9 MG/DL (ref 8.8–10.2)
CHLORIDE SERPL-SCNC: 101 MEQ/L (ref 98–107)
CHOLEST SERPL-MCNC: 172 MG/DL (ref ?–200)
CO2 SERPL-SCNC: 32 MEQ/L (ref 21–32)
CREAT SERPL-MCNC: 1.48 MG/DL (ref 0.7–1.3)
EOSINOPHIL # BLD AUTO: 0.5 K/MM3 (ref 0–0.5)
EOSINOPHIL NFR BLD AUTO: 10.1 % (ref 0–3)
ERYTHROCYTE [DISTWIDTH] IN BLOOD BY AUTOMATED COUNT: 16.7 % (ref 11.5–14.5)
GFR SERPL CREATININE-BSD FRML MDRD: 51.6 ML/MIN/{1.73_M2} (ref 49–?)
GLUCOSE SERPL-MCNC: 73 MG/DL (ref 80–110)
LARGE UNSTAINED CELL #: 0.1 K/MM3 (ref 0–0.4)
LARGE UNSTAINED CELL %: 2.2 % (ref 0–4)
LYMPHOCYTES # BLD AUTO: 1.2 K/MM3 (ref 1.5–4.5)
LYMPHOCYTES NFR BLD AUTO: 21.9 % (ref 24–44)
MCH RBC QN AUTO: 27.3 PG (ref 27–33)
MCHC RBC AUTO-ENTMCNC: 33 G/DL (ref 32–36.5)
MCV RBC AUTO: 82.6 FL (ref 80–96)
MONOCYTES # BLD AUTO: 0.4 K/MM3 (ref 0–0.8)
MONOCYTES NFR BLD AUTO: 7.8 % (ref 0–5)
NEUTROPHILS # BLD AUTO: 2.9 K/MM3 (ref 1.8–7.7)
NEUTROPHILS NFR BLD AUTO: 57.2 % (ref 36–66)
PLATELET # BLD AUTO: 209 K/MM3 (ref 150–450)
POTASSIUM SERPL-SCNC: 4.5 MEQ/L (ref 3.5–5.1)
SODIUM SERPL-SCNC: 137 MEQ/L (ref 136–145)
TRIGL SERPL-MCNC: 152 MG/DL (ref ?–150)
WBC # BLD AUTO: 5.1 K/MM3 (ref 4–10)

## 2017-08-02 ENCOUNTER — HOSPITAL ENCOUNTER (OUTPATIENT)
Dept: HOSPITAL 53 - M LAB REF | Age: 62
End: 2017-08-02
Attending: INTERNAL MEDICINE
Payer: MEDICAID

## 2017-08-02 DIAGNOSIS — I96: Primary | ICD-10-CM

## 2017-08-02 LAB
ANION GAP SERPL CALC-SCNC: 8 MEQ/L (ref 8–16)
BASOPHILS # BLD AUTO: 0.1 K/MM3 (ref 0–0.2)
BASOPHILS NFR BLD AUTO: 0.8 % (ref 0–1)
BUN SERPL-MCNC: 19 MG/DL (ref 7–18)
CALCIUM SERPL-MCNC: 8.2 MG/DL (ref 8.8–10.2)
CHLORIDE SERPL-SCNC: 102 MEQ/L (ref 98–107)
CO2 SERPL-SCNC: 29 MEQ/L (ref 21–32)
CREAT SERPL-MCNC: 1.58 MG/DL (ref 0.7–1.3)
EOSINOPHIL # BLD AUTO: 0.6 K/MM3 (ref 0–0.5)
EOSINOPHIL NFR BLD AUTO: 8.3 % (ref 0–3)
ERYTHROCYTE [DISTWIDTH] IN BLOOD BY AUTOMATED COUNT: 16.2 % (ref 11.5–14.5)
GFR SERPL CREATININE-BSD FRML MDRD: 47.9 ML/MIN/{1.73_M2} (ref 49–?)
GLUCOSE SERPL-MCNC: 101 MG/DL (ref 80–110)
LARGE UNSTAINED CELL #: 0.1 K/MM3 (ref 0–0.4)
LARGE UNSTAINED CELL %: 1.6 % (ref 0–4)
LYMPHOCYTES # BLD AUTO: 1.7 K/MM3 (ref 1.5–4.5)
LYMPHOCYTES NFR BLD AUTO: 22.1 % (ref 24–44)
MCH RBC QN AUTO: 27.4 PG (ref 27–33)
MCHC RBC AUTO-ENTMCNC: 32.6 G/DL (ref 32–36.5)
MCV RBC AUTO: 83.9 FL (ref 80–96)
MONOCYTES # BLD AUTO: 0.6 K/MM3 (ref 0–0.8)
MONOCYTES NFR BLD AUTO: 7.5 % (ref 0–5)
NEUTROPHILS # BLD AUTO: 4.3 K/MM3 (ref 1.8–7.7)
NEUTROPHILS NFR BLD AUTO: 59.5 % (ref 36–66)
PLATELET # BLD AUTO: 182 K/MM3 (ref 150–450)
POTASSIUM SERPL-SCNC: 3.8 MEQ/L (ref 3.5–5.1)
SODIUM SERPL-SCNC: 139 MEQ/L (ref 136–145)
WBC # BLD AUTO: 7.2 K/MM3 (ref 4–10)

## 2017-08-16 ENCOUNTER — HOSPITAL ENCOUNTER (OUTPATIENT)
Dept: HOSPITAL 53 - M LAB REF | Age: 62
End: 2017-08-16
Attending: INTERNAL MEDICINE
Payer: MEDICARE

## 2017-08-16 DIAGNOSIS — R73.01: ICD-10-CM

## 2017-08-16 DIAGNOSIS — I96: Primary | ICD-10-CM

## 2017-08-16 LAB
ANION GAP SERPL CALC-SCNC: 9 MEQ/L (ref 8–16)
BASOPHILS # BLD AUTO: 0 K/MM3 (ref 0–0.2)
BASOPHILS NFR BLD AUTO: 0.7 % (ref 0–1)
BUN SERPL-MCNC: 17 MG/DL (ref 7–18)
CALCIUM SERPL-MCNC: 9 MG/DL (ref 8.8–10.2)
CHLORIDE SERPL-SCNC: 102 MEQ/L (ref 98–107)
CO2 SERPL-SCNC: 29 MEQ/L (ref 21–32)
CREAT SERPL-MCNC: 1.27 MG/DL (ref 0.7–1.3)
EOSINOPHIL # BLD AUTO: 0.4 K/MM3 (ref 0–0.5)
EOSINOPHIL NFR BLD AUTO: 7.4 % (ref 0–3)
ERYTHROCYTE [DISTWIDTH] IN BLOOD BY AUTOMATED COUNT: 15.5 % (ref 11.5–14.5)
EST. AVERAGE GLUCOSE BLD GHB EST-MCNC: 137 MG/DL (ref 60–110)
GFR SERPL CREATININE-BSD FRML MDRD: > 60 ML/MIN/{1.73_M2} (ref 49–?)
GLUCOSE SERPL-MCNC: 71 MG/DL (ref 80–110)
LARGE UNSTAINED CELL #: 0.1 K/MM3 (ref 0–0.4)
LARGE UNSTAINED CELL %: 1.6 % (ref 0–4)
LYMPHOCYTES # BLD AUTO: 1.5 K/MM3 (ref 1.5–4.5)
LYMPHOCYTES NFR BLD AUTO: 23.9 % (ref 24–44)
MCH RBC QN AUTO: 27.1 PG (ref 27–33)
MCHC RBC AUTO-ENTMCNC: 32.9 G/DL (ref 32–36.5)
MCV RBC AUTO: 82.4 FL (ref 80–96)
MONOCYTES # BLD AUTO: 0.4 K/MM3 (ref 0–0.8)
MONOCYTES NFR BLD AUTO: 7.3 % (ref 0–5)
NEUTROPHILS # BLD AUTO: 3.4 K/MM3 (ref 1.8–7.7)
NEUTROPHILS NFR BLD AUTO: 59.1 % (ref 36–66)
PLATELET # BLD AUTO: 190 K/MM3 (ref 150–450)
POTASSIUM SERPL-SCNC: 4.6 MEQ/L (ref 3.5–5.1)
SODIUM SERPL-SCNC: 140 MEQ/L (ref 136–145)
WBC # BLD AUTO: 5.7 K/MM3 (ref 4–10)

## 2017-08-30 ENCOUNTER — HOSPITAL ENCOUNTER (OUTPATIENT)
Dept: HOSPITAL 53 - M LAB REF | Age: 62
End: 2017-08-30
Attending: INTERNAL MEDICINE
Payer: MEDICARE

## 2017-08-30 DIAGNOSIS — I96: Primary | ICD-10-CM

## 2017-08-30 LAB
ANION GAP SERPL CALC-SCNC: 6 MEQ/L (ref 8–16)
BASOPHILS # BLD AUTO: 0.1 K/MM3 (ref 0–0.2)
BASOPHILS NFR BLD AUTO: 0.8 % (ref 0–1)
BUN SERPL-MCNC: 36 MG/DL (ref 7–18)
CALCIUM SERPL-MCNC: 8.8 MG/DL (ref 8.8–10.2)
CHLORIDE SERPL-SCNC: 101 MEQ/L (ref 98–107)
CO2 SERPL-SCNC: 29 MEQ/L (ref 21–32)
CREAT SERPL-MCNC: 2.05 MG/DL (ref 0.7–1.3)
EOSINOPHIL # BLD AUTO: 0.4 K/MM3 (ref 0–0.5)
EOSINOPHIL NFR BLD AUTO: 6.6 % (ref 0–3)
ERYTHROCYTE [DISTWIDTH] IN BLOOD BY AUTOMATED COUNT: 15.5 % (ref 11.5–14.5)
GFR SERPL CREATININE-BSD FRML MDRD: 35.4 ML/MIN/{1.73_M2} (ref 49–?)
GLUCOSE SERPL-MCNC: 104 MG/DL (ref 80–110)
LARGE UNSTAINED CELL #: 0.2 K/MM3 (ref 0–0.4)
LARGE UNSTAINED CELL %: 2.5 % (ref 0–4)
LYMPHOCYTES # BLD AUTO: 1.9 K/MM3 (ref 1.5–4.5)
LYMPHOCYTES NFR BLD AUTO: 25.7 % (ref 24–44)
MCH RBC QN AUTO: 27.4 PG (ref 27–33)
MCHC RBC AUTO-ENTMCNC: 33.4 G/DL (ref 32–36.5)
MCV RBC AUTO: 82.3 FL (ref 80–96)
MONOCYTES # BLD AUTO: 0.7 K/MM3 (ref 0–0.8)
MONOCYTES NFR BLD AUTO: 10 % (ref 0–5)
NEUTROPHILS # BLD AUTO: 3.6 K/MM3 (ref 1.8–7.7)
NEUTROPHILS NFR BLD AUTO: 54.4 % (ref 36–66)
PLATELET # BLD AUTO: 194 K/MM3 (ref 150–450)
POTASSIUM SERPL-SCNC: 5.4 MEQ/L (ref 3.5–5.1)
SODIUM SERPL-SCNC: 136 MEQ/L (ref 136–145)
WBC # BLD AUTO: 6.7 K/MM3 (ref 4–10)

## 2017-10-12 ENCOUNTER — HOSPITAL ENCOUNTER (INPATIENT)
Dept: HOSPITAL 53 - M ED | Age: 62
LOS: 4 days | Discharge: HOME HEALTH | DRG: 501 | End: 2017-10-16
Attending: HOSPITALIST | Admitting: HOSPITALIST
Payer: MEDICARE

## 2017-10-12 VITALS — DIASTOLIC BLOOD PRESSURE: 84 MMHG | SYSTOLIC BLOOD PRESSURE: 134 MMHG

## 2017-10-12 VITALS — HEIGHT: 72 IN | BODY MASS INDEX: 40.64 KG/M2 | WEIGHT: 300.05 LBS

## 2017-10-12 VITALS — DIASTOLIC BLOOD PRESSURE: 82 MMHG | SYSTOLIC BLOOD PRESSURE: 136 MMHG

## 2017-10-12 VITALS — DIASTOLIC BLOOD PRESSURE: 70 MMHG | SYSTOLIC BLOOD PRESSURE: 130 MMHG

## 2017-10-12 VITALS — DIASTOLIC BLOOD PRESSURE: 75 MMHG | SYSTOLIC BLOOD PRESSURE: 130 MMHG

## 2017-10-12 VITALS — DIASTOLIC BLOOD PRESSURE: 65 MMHG | SYSTOLIC BLOOD PRESSURE: 145 MMHG

## 2017-10-12 VITALS — DIASTOLIC BLOOD PRESSURE: 75 MMHG | SYSTOLIC BLOOD PRESSURE: 132 MMHG

## 2017-10-12 DIAGNOSIS — I27.29: ICD-10-CM

## 2017-10-12 DIAGNOSIS — E11.621: ICD-10-CM

## 2017-10-12 DIAGNOSIS — I50.32: ICD-10-CM

## 2017-10-12 DIAGNOSIS — Z79.4: ICD-10-CM

## 2017-10-12 DIAGNOSIS — Z79.899: ICD-10-CM

## 2017-10-12 DIAGNOSIS — Z88.8: ICD-10-CM

## 2017-10-12 DIAGNOSIS — E78.5: ICD-10-CM

## 2017-10-12 DIAGNOSIS — I25.10: ICD-10-CM

## 2017-10-12 DIAGNOSIS — Z79.82: ICD-10-CM

## 2017-10-12 DIAGNOSIS — N18.3: ICD-10-CM

## 2017-10-12 DIAGNOSIS — M86.171: Primary | ICD-10-CM

## 2017-10-12 DIAGNOSIS — I13.0: ICD-10-CM

## 2017-10-12 DIAGNOSIS — N40.0: ICD-10-CM

## 2017-10-12 DIAGNOSIS — E66.01: ICD-10-CM

## 2017-10-12 DIAGNOSIS — L03.115: ICD-10-CM

## 2017-10-12 DIAGNOSIS — L97.519: ICD-10-CM

## 2017-10-12 DIAGNOSIS — B96.1: ICD-10-CM

## 2017-10-12 DIAGNOSIS — E11.40: ICD-10-CM

## 2017-10-12 DIAGNOSIS — Z87.891: ICD-10-CM

## 2017-10-12 LAB
ANION GAP SERPL CALC-SCNC: 3 MEQ/L (ref 8–16)
ANION GAP SERPL CALC-SCNC: 7 MEQ/L (ref 8–16)
BASOPHILS # BLD AUTO: 0 10^3/UL (ref 0–0.2)
BASOPHILS # BLD AUTO: 0 10^3/UL (ref 0–0.2)
BASOPHILS NFR BLD AUTO: 0.4 % (ref 0–1)
BASOPHILS NFR BLD AUTO: 0.5 % (ref 0–1)
BUN SERPL-MCNC: 26 MG/DL (ref 7–18)
BUN SERPL-MCNC: 27 MG/DL (ref 7–18)
CALCIUM SERPL-MCNC: 8.4 MG/DL (ref 8.8–10.2)
CALCIUM SERPL-MCNC: 8.8 MG/DL (ref 8.8–10.2)
CHLORIDE SERPL-SCNC: 103 MEQ/L (ref 98–107)
CHLORIDE SERPL-SCNC: 103 MEQ/L (ref 98–107)
CO2 SERPL-SCNC: 26 MEQ/L (ref 21–32)
CO2 SERPL-SCNC: 28 MEQ/L (ref 21–32)
CREAT SERPL-MCNC: 1.47 MG/DL (ref 0.7–1.3)
CREAT SERPL-MCNC: 1.56 MG/DL (ref 0.7–1.3)
EOSINOPHIL # BLD AUTO: 0.7 10^3/UL (ref 0–0.5)
EOSINOPHIL # BLD AUTO: 0.7 10^3/UL (ref 0–0.5)
EOSINOPHIL NFR BLD AUTO: 10.9 % (ref 0–3)
EOSINOPHIL NFR BLD AUTO: 9.9 % (ref 0–3)
ERYTHROCYTE [DISTWIDTH] IN BLOOD BY AUTOMATED COUNT: 15.1 % (ref 11.5–14.5)
ERYTHROCYTE [DISTWIDTH] IN BLOOD BY AUTOMATED COUNT: 15.2 % (ref 11.5–14.5)
ERYTHROCYTE [SEDIMENTATION RATE] IN BLOOD BY WESTERGREN METHOD: 71 MM/HR (ref 0–20)
GFR SERPL CREATININE-BSD FRML MDRD: 48.6 ML/MIN/{1.73_M2} (ref 49–?)
GFR SERPL CREATININE-BSD FRML MDRD: 52 ML/MIN/{1.73_M2} (ref 49–?)
GLUCOSE SERPL-MCNC: 116 MG/DL (ref 80–110)
GLUCOSE SERPL-MCNC: 88 MG/DL (ref 80–110)
IMM GRANULOCYTES NFR BLD: 0.3 % (ref 0–0)
IMM GRANULOCYTES NFR BLD: 0.4 % (ref 0–0)
LYMPHOCYTES # BLD AUTO: 1.3 10^3/UL (ref 1.5–4.5)
LYMPHOCYTES # BLD AUTO: 1.7 10^3/UL (ref 1.5–4.5)
LYMPHOCYTES NFR BLD AUTO: 20.6 % (ref 24–44)
LYMPHOCYTES NFR BLD AUTO: 25.3 % (ref 24–44)
MCH RBC QN AUTO: 26.5 PG (ref 27–33)
MCH RBC QN AUTO: 26.6 PG (ref 27–33)
MCHC RBC AUTO-ENTMCNC: 32.1 G/DL (ref 32–36.5)
MCHC RBC AUTO-ENTMCNC: 32.2 G/DL (ref 32–36.5)
MCV RBC AUTO: 82.4 FL (ref 80–96)
MCV RBC AUTO: 82.9 FL (ref 80–96)
MONOCYTES # BLD AUTO: 0.4 10^3/UL (ref 0–0.8)
MONOCYTES # BLD AUTO: 0.6 10^3/UL (ref 0–0.8)
MONOCYTES NFR BLD AUTO: 7 % (ref 0–5)
MONOCYTES NFR BLD AUTO: 8.6 % (ref 0–5)
NEUTROPHILS # BLD AUTO: 3.8 10^3/UL (ref 1.8–7.7)
NEUTROPHILS # BLD AUTO: 3.8 10^3/UL (ref 1.8–7.7)
NEUTROPHILS NFR BLD AUTO: 55.4 % (ref 36–66)
NEUTROPHILS NFR BLD AUTO: 60.7 % (ref 36–66)
NRBC BLD AUTO-RTO: 0 % (ref 0–0)
NRBC BLD AUTO-RTO: 0 % (ref 0–0)
PLATELET # BLD AUTO: 226 10^3/UL (ref 150–450)
PLATELET # BLD AUTO: 226 10^3/UL (ref 150–450)
POTASSIUM SERPL-SCNC: 4.6 MEQ/L (ref 3.5–5.1)
POTASSIUM SERPL-SCNC: 4.6 MEQ/L (ref 3.5–5.1)
SODIUM SERPL-SCNC: 134 MEQ/L (ref 136–145)
SODIUM SERPL-SCNC: 136 MEQ/L (ref 136–145)
WBC # BLD AUTO: 6.3 10^3/UL (ref 4–10)
WBC # BLD AUTO: 6.8 10^3/UL (ref 4–10)

## 2017-10-12 PROCEDURE — 0YBM0ZZ EXCISION OF RIGHT FOOT, OPEN APPROACH: ICD-10-PCS | Performed by: PODIATRIST

## 2017-10-12 RX ADMIN — HYDRALAZINE HYDROCHLORIDE SCH MG: 25 TABLET, FILM COATED ORAL at 20:57

## 2017-10-12 RX ADMIN — HYDRALAZINE HYDROCHLORIDE SCH MG: 25 TABLET, FILM COATED ORAL at 14:00

## 2017-10-12 RX ADMIN — INSULIN LISPRO SCH UNITS: 100 INJECTION, SOLUTION INTRAVENOUS; SUBCUTANEOUS at 20:39

## 2017-10-12 RX ADMIN — SODIUM CHLORIDE SCH UNITS: 4.5 INJECTION, SOLUTION INTRAVENOUS at 09:00

## 2017-10-12 RX ADMIN — INSULIN LISPRO SCH UNITS: 100 INJECTION, SOLUTION INTRAVENOUS; SUBCUTANEOUS at 16:50

## 2017-10-12 RX ADMIN — FUROSEMIDE SCH MG: 80 TABLET ORAL at 09:43

## 2017-10-12 RX ADMIN — HYDRALAZINE HYDROCHLORIDE SCH MG: 25 TABLET, FILM COATED ORAL at 06:38

## 2017-10-12 RX ADMIN — SODIUM CHLORIDE SCH UNITS: 4.5 INJECTION, SOLUTION INTRAVENOUS at 21:06

## 2017-10-12 RX ADMIN — GABAPENTIN SCH MG: 300 CAPSULE ORAL at 09:44

## 2017-10-12 RX ADMIN — SODIUM CHLORIDE SCH UNITS: 4.5 INJECTION, SOLUTION INTRAVENOUS at 09:44

## 2017-10-12 RX ADMIN — SODIUM CHLORIDE SCH MLS/HR: 9 INJECTION, SOLUTION INTRAVENOUS at 18:00

## 2017-10-12 RX ADMIN — MORPHINE SULFATE PRN MG: 2 INJECTION, SOLUTION INTRAMUSCULAR; INTRAVENOUS at 04:54

## 2017-10-12 RX ADMIN — SODIUM CHLORIDE SCH MLS/HR: 9 INJECTION, SOLUTION INTRAVENOUS at 06:52

## 2017-10-12 RX ADMIN — LISINOPRIL SCH MG: 5 TABLET ORAL at 09:44

## 2017-10-12 RX ADMIN — DEXTROSE MONOHYDRATE SCH MLS/HR: 50 INJECTION, SOLUTION INTRAVENOUS at 05:34

## 2017-10-12 RX ADMIN — DOCUSATE SODIUM,SENNOSIDES SCH TAB: 50; 8.6 TABLET, FILM COATED ORAL at 21:05

## 2017-10-12 RX ADMIN — INSULIN LISPRO SCH UNITS: 100 INJECTION, SOLUTION INTRAVENOUS; SUBCUTANEOUS at 07:30

## 2017-10-12 RX ADMIN — GABAPENTIN SCH MG: 300 CAPSULE ORAL at 16:23

## 2017-10-12 RX ADMIN — SODIUM CHLORIDE SCH UNITS: 4.5 INJECTION, SOLUTION INTRAVENOUS at 21:00

## 2017-10-12 RX ADMIN — LISINOPRIL SCH MG: 5 TABLET ORAL at 21:05

## 2017-10-12 RX ADMIN — DEXTROSE MONOHYDRATE SCH MLS/HR: 50 INJECTION, SOLUTION INTRAVENOUS at 16:23

## 2017-10-12 RX ADMIN — INSULIN LISPRO SCH UNITS: 100 INJECTION, SOLUTION INTRAVENOUS; SUBCUTANEOUS at 12:00

## 2017-10-12 RX ADMIN — GABAPENTIN SCH MG: 300 CAPSULE ORAL at 21:05

## 2017-10-12 RX ADMIN — INSULIN DETEMIR SCH UNITS: 100 INJECTION, SOLUTION SUBCUTANEOUS at 21:06

## 2017-10-12 RX ADMIN — ATORVASTATIN CALCIUM SCH MG: 20 TABLET, FILM COATED ORAL at 09:43

## 2017-10-12 RX ADMIN — ASPIRIN SCH MG: 81 TABLET ORAL at 09:44

## 2017-10-12 RX ADMIN — DOCUSATE SODIUM,SENNOSIDES SCH TAB: 50; 8.6 TABLET, FILM COATED ORAL at 09:44

## 2017-10-12 NOTE — REPUSA
CLINICAL HISTORY: Edema.

COMMENTS:

Real time sonography with duplex doppler of the right lower extremity was performed with attention to
 the major deep venous structures.

Evaluation reveals the right common femoral, superficial femoral and popliteal veins to be completely
 compressible without intraluminal thrombus. There is normal spontaneous phasic flow and augmentation
. The greater saphenous/common femoral vein junction is patent. No reflux was noted.

Mildly enlarged lymph nodes in the right groin.

IMPRESSION:

No evidence of DVT in right lower extremity.

Mildly enlarged lymph nodes in the right groin, probably benign and reactive.

Thank you for your kind referral of this patient.

     Electronically signed by AVINASH NOBLES MD on 10/12/2017 02:29:38 AM ET

## 2017-10-12 NOTE — IPNPDOC
Date Seen


The patient was seen on 10/12/17.





Progress Note


Hospitalist Progress Note





Subjective: Patient states that he feels better than when he arrived in his leg 

is less swollen





Objective:





Physical Exam:


Vitals: 


Vital Sign - Last 24 Hours








 10/12/17 10/12/17 10/12/17 10/12/17





 01:12 01:28 01:38 01:53


 


Temp 98.9   


 


Pulse 76  68 66


 


Resp 18   


 


B/P (MAP) 189/93 (125)   


 


Pulse Ox 97  97 98


 


O2 Delivery Room Air   


 


    





 10/12/17 10/12/17 10/12/17 10/12/17





 02:08 02:23 02:38 02:53


 


Pulse 64 64 68 70


 


Pulse Ox 94 94 97 97





 10/12/17 10/12/17 10/12/17 10/12/17





 03:08 03:19 03:23 03:38


 


Pulse 64  66 64


 


B/P (MAP)  200/91 (127)  


 


Pulse Ox 95  95 98





 10/12/17 10/12/17 10/12/17 10/12/17





 03:41 03:49 04:14 04:30


 


Temp  98.1 98.0 97.8


 


Pulse  67 69 65


 


Resp  18 18 18


 


B/P (MAP) 182/88 (119) 184/91 (122) 168/82 (110) 130/75 (93)


 


Pulse Ox  96 96 97


 


O2 Delivery  Room Air Room Air Room Air


 


    





 10/12/17 10/12/17 10/12/17 10/12/17





 04:54 05:34 06:38 09:44


 


Resp 20 20  


 


B/P (MAP)   132/68 132/68





 10/12/17   





 14:00   


 


B/P (MAP) 134/84   








General: Awake, alert, no acute distress


HEENT: Normocephalic, atraumatic, extraocular movements intact


CV: Regular rate and rhythm


Lungs: Clear to auscultation bilaterally


Abd: Soft, nontender, nondistended


Extremities: Right lower extremity is extremely swollen and erythematous, pedal 

pulses intact bilaterally


Neuro: Alert and oriented 3, normal speech


Psych: Normal Mood and affect





Labs and Imaging:


Laboratory Tests


10/12/17 01:38








Red Blood Count 3.91 L, Mean Corpuscular Volume 82.9, Mean Corpuscular 

Hemoglobin 26.6 L, Mean Corpuscular Hemoglobin Concent 32.1, Red Cell 

Distribution Width 15.2 H, Neutrophils (%) (Auto) 55.4, Lymphocytes (%) (Auto) 

25.3, Monocytes (%) (Auto) 8.6 H, Eosinophils (%) (Auto) 9.9 H, Basophils (%) (

Auto) 0.4, Neutrophils # (Auto) 3.8, Lymphocytes # (Auto) 1.7, Monocytes # (Auto

) 0.6, Eosinophils # (Auto) 0.7 H, Basophils # (Auto) 0.0, Calcium Level 8.4 L





10/12/17 06:36








Red Blood Count 4.15 L, Mean Corpuscular Volume 82.4, Mean Corpuscular 

Hemoglobin 26.5 L, Mean Corpuscular Hemoglobin Concent 32.2, Red Cell 

Distribution Width 15.1 H, Neutrophils (%) (Auto) 60.7, Lymphocytes (%) (Auto) 

20.6 L, Monocytes (%) (Auto) 7.0 H, Eosinophils (%) (Auto) 10.9 H, Basophils (%

) (Auto) 0.5, Neutrophils # (Auto) 3.8, Lymphocytes # (Auto) 1.3 L, Monocytes # 

(Auto) 0.4, Eosinophils # (Auto) 0.7 H, Basophils # (Auto) 0.0, Calcium Level 

8.8











Assessment and Plan:





62-year-old male with chronic diabetic foot ulcer, chronic venostasis of the 

bilateral lower extremities, recurrent lower extremity cellulitis, chronic 

diastolic CHF with right heart failure, CKD stage III, hypertension, morbid 

obesity, diabetes mellitus type 2 with neuropathy, CAD status post PCI who is 

admitted with cellulitis of the right lower extremity and concern for potential 

osteomyelitis.





1. Cellulitis of the right lower extremity, possible osteomyelitis: The patient 

is afebrile with a normal white count, but his right lower extremity is quite 

impressive clinically. The patient has been following with Dr. Saravia and 

Dr. Banerjee as an outpatient, and both providers have graciously agreed to 

evaluate the patient in the hospital. MRI of the right lower extremity is 

highly suspicious for osteomyelitis in the remaining fifth metatarsal as well 

as the adjacent cuboid. It also cannot exclude septic arthritis of the second 

MTP joint with osteomyelitis on both sides. At this time, we will continue the 

patient on vancomycin and Merrem. We'll follow up blood and wound cultures.





2. Chronic diastolic CHF with right heart failure: Patient currently appears 

euvolemic. We'll continue his home by mouth Lasix.





3. CKD stage III: The patient's baseline creatinine appears to be in the mid 

ones. He is currently at baseline. We will continue to monitor.





4. Hypertension: Continue home Lasix, and ACE inhibitor. Hydralazine was added 

on admission as the patient's BP was quite elevated, and he has had good 

response to this.





5. Diabetes mellitus type 2 with neuropathy: Continue home gabapentin and home 

Levemir. Sliding scale insulin while in house.





6. CAD status post PCI: No current chest pain. Continue home aspirin, statin. 

The patient is not on a beta blocker at baseline, presumably secondary to his 

baseline heart rate in the 60s.





DVT prophylaxis: Heparin





Dispo: pending evaluation by infectious disease and podiatry





VS, I&O, 24H, Bob


Vital Signs/I&O





Vital Signs








  Date Time  Temp Pulse Resp B/P (MAP) Pulse Ox O2 Delivery O2 Flow Rate FiO2


 


10/12/17 14:00    134/84    


 


10/12/17 05:34   20     


 


10/12/17 04:30 97.8 65   97 Room Air  














I&O- Last 24 Hours up to 6 AM 


 


 10/13/17





 06:00


 


Intake Total 300 ml


 


Output Total 1300 ml


 


Balance -1000 ml











Laboratory Data


24H LABS


Laboratory Tests 2


10/12/17 01:38: 


Immature Granulocyte % (Auto) 0.4H, White Blood Count 6.8, Red Blood Count 3.91L

, Hemoglobin 10.4L, Hematocrit 32.4L, Mean Corpuscular Volume 82.9, Mean 

Corpuscular Hemoglobin 26.6L, Mean Corpuscular Hemoglobin Concent 32.1, Red 

Cell Distribution Width 15.2H, Platelet Count 226, Neutrophils (%) (Auto) 55.4, 

Lymphocytes (%) (Auto) 25.3, Monocytes (%) (Auto) 8.6H, Eosinophils (%) (Auto) 

9.9H, Basophils (%) (Auto) 0.4, Neutrophils # (Auto) 3.8, Lymphocytes # (Auto) 

1.7, Monocytes # (Auto) 0.6, Eosinophils # (Auto) 0.7H, Basophils # (Auto) 0.0, 

Immature Granulocyte # (Auto) 0.0, Nucleated Red Blood Cells % (auto) 0.0, 

Erythrocyte Sedimentation Rate 71H, Anion Gap 3L, Glomerular Filtration Rate 

48.6L, Blood Urea Nitrogen 27H, Creatinine 1.56H, Sodium Level 134L, Potassium 

Level 4.6, Chloride Level 103, Carbon Dioxide Level 28, Calcium Level 8.4L, C-

Reactive Protein, Quantitative 1.67H


10/12/17 06:36: 


Immature Granulocyte % (Auto) 0.3H, White Blood Count 6.3, Red Blood Count 4.15L

, Hemoglobin 11.0L, Hematocrit 34.2L, Mean Corpuscular Volume 82.4, Mean 

Corpuscular Hemoglobin 26.5L, Mean Corpuscular Hemoglobin Concent 32.2, Red 

Cell Distribution Width 15.1H, Platelet Count 226, Neutrophils (%) (Auto) 60.7, 

Lymphocytes (%) (Auto) 20.6L, Monocytes (%) (Auto) 7.0H, Eosinophils (%) (Auto) 

10.9H, Basophils (%) (Auto) 0.5, Neutrophils # (Auto) 3.8, Lymphocytes # (Auto) 

1.3L, Monocytes # (Auto) 0.4, Eosinophils # (Auto) 0.7H, Basophils # (Auto) 0.0

, Immature Granulocyte # (Auto) 0.0, Nucleated Red Blood Cells % (auto) 0.0, 

Anion Gap 7L, Glomerular Filtration Rate 52.0, Blood Urea Nitrogen 26H, 

Creatinine 1.47H, Sodium Level 136, Potassium Level 4.6, Chloride Level 103, 

Carbon Dioxide Level 26, Calcium Level 8.8


10/12/17 11:24: Bedside Glucose (Misc Panel) 127H


CBC/BMP


Laboratory Tests


10/12/17 01:38








Red Blood Count 3.91 L, Mean Corpuscular Volume 82.9, Mean Corpuscular 

Hemoglobin 26.6 L, Mean Corpuscular Hemoglobin Concent 32.1, Red Cell 

Distribution Width 15.2 H, Neutrophils (%) (Auto) 55.4, Lymphocytes (%) (Auto) 

25.3, Monocytes (%) (Auto) 8.6 H, Eosinophils (%) (Auto) 9.9 H, Basophils (%) (

Auto) 0.4, Neutrophils # (Auto) 3.8, Lymphocytes # (Auto) 1.7, Monocytes # (Auto

) 0.6, Eosinophils # (Auto) 0.7 H, Basophils # (Auto) 0.0, Calcium Level 8.4 L





10/12/17 06:36








Red Blood Count 4.15 L, Mean Corpuscular Volume 82.4, Mean Corpuscular 

Hemoglobin 26.5 L, Mean Corpuscular Hemoglobin Concent 32.2, Red Cell 

Distribution Width 15.1 H, Neutrophils (%) (Auto) 60.7, Lymphocytes (%) (Auto) 

20.6 L, Monocytes (%) (Auto) 7.0 H, Eosinophils (%) (Auto) 10.9 H, Basophils (%

) (Auto) 0.5, Neutrophils # (Auto) 3.8, Lymphocytes # (Auto) 1.3 L, Monocytes # 

(Auto) 0.4, Eosinophils # (Auto) 0.7 H, Basophils # (Auto) 0.0, Calcium Level 

8.8


Microbiology





Microbiology


10/12/17 Blood Culture, Received


           Pending


10/12/17 Blood Culture, Received


           Pending


10/12/17 Wound Culture, Received


           Pending











NAVARRO SHIELDS Oct 12, 2017 14:48

## 2017-10-12 NOTE — HPEPDOC
General


Date of Admission


10-12-17


Chief Complaint


The patient is a 62-year-old male admitted with a reason for visit of Swollen 

Leg.





History of Present Illness


61 y/o male with extensive past medical history of chronic diabetic foot ulcer 

with cellulitis and chronic venostasis, diastolic HF, CKD stage 3, htn, morbid 

obesity, DM 2 with neuropathy who presents today with the complaint of 

worsening swelling and pain in his right leg. The pt states that he noted about 

a day and a half ago to have increasing pain and swelling of his right foot, he 

recently saw the local infectious disease doctor two weeks ago who said he was 

doing well and did not need to see her any further. He also follows with local 

podiatrist for treatment of his chronic diabetic foot ulcer located on the 

bottom of his right foot. The pt states he has been having chills in the past 

day or so, denies fever, nausea or vomiting, denies cp or sob nor history of 

trauma to his right foot, denies change in bowel or urinary habits. He has not 

traveled outside the  and has had no recent sick contacts. He states his 

blood sugars are well under control and usually run 100-120 in the mornings 

before he eats. The increased pain and swelling of the right leg has made it a 

lot more difficult for the pt to ambulate and bear weight.





Home Medications


Scheduled


Aspirin (Aspirin 81) 81 Mg Tab, 81 MG PO DAILY, (Reported)


Atorvastatin Calcium (Atorvastatin Calcium) 40 Mg Tab, 40 MG PO DAILY, (Reported

)


Furosemide (Furosemide) 80 Mg Tab, 80 MG PO DAILY, (Reported)


Gabapentin (Gabapentin) 300 Mg Cap, 300 MG PO TID, (Reported)


Insulin Detemir (Levemir) 1 Units/0.01 Ml Susp, 8 UNITS SC BID, (Reported)


Lisinopril (Lisinopril) 5 Mg Tab, 5 MG PO DAILY, (Reported)





Scheduled PRN


Nitroglycerin (Nitrostat) 0.4 Mg Subl, 0.4 MG SL Q5MP PRN for CHEST PAIN, (

Reported)


Tramadol HCl (Tramadol HCl) 50 Mg Tab, 50 MG PO Q8H PRN for PAIN, (Reported)





Allergies


Coded Allergies:  


     Metformin (Unverified  Adverse Reaction, Unknown, Disturbances in Vision, 3

/14/17)





Past Medical History


Medical History


Chronic diabetic foot ulcer with cellulitis with chronic venostasis.


  Diastolic congestive heart failure.


  Severe pulmonary hypertension with cor pulmonale.


  Morbid obesity.


  Hypertension.


  Restrictive lung disease.


  Possible sleep apnea..  Possible obesity hypoventilation.


  Chronic kidney disease, stage III.


  Diabetes with diabetic neuropathy..  Coronary artery disease, status post 

stenting in 2009.


  Benign prostatic hypertrophy (BPH).


 Bilateral lymphedema from chronic edema.


 Diffuse lymphadenopathy, as seen in previous CT scan. Recommended CT in 3


months and followup with hematology/oncology during prior admission.


  History of left testicular resection.


Surgical History


Amputation of right 3rd to 5th toes in February 2016.


  Percutaneous coronary intervention (PCI) in 2009.


 Left testicular resection as a child.


  Multiple incision and drainage of the right foot since December 2016.


  Appendectomy.


 Thyroidectomy.





Social History


* Smoker:  former Smoker (pt quit smoking in 2016, was 1 ppd since early 20's)


Alcohol:  Denies


Drugs:  denies





Review of Symptoms


Constitutional:  Reports: Chills, Malaise, 


   Denies: Fever, Night Sweats, Weakness, Fatigue


Eyes:  Denies: Pain, Vision change


ENT:  Denies: Head Aches


Skin:  Reports: Lesions (right foot ulcer diabetic on bottom of foot), 


   Denies: Rash, Jaundice


Pulmonary:  Denies: Dyspnea, Cough


Cardiovascular:  Denies: Chest Pain, Palpitations


Gastrointestinal:  Denies: Nausea, Vomiting, Abdominal Pain


Genitourinary:  Denies: Dysuria, Frequency


Hematologic:  Denies: Bruising


Musculoskeletal:  Denies: Neck Pain


Neurological:  Denies: Weakness


Psych:  Reports: Mood Normal





Physical Examination


General Exam:  Positive: Alert, Cooperative, No Acute Distress


Eye Exam:  Positive: Conjunctiva & lids normal, EOMI, 


   Negative: Sclera icteric


ENT Exam:  Positive: Atraumatic, Mucous membr. moist/pink, Pharynx Normal


Neck Exam:  Positive: Supple


Chest Exam:  Positive: Clear to auscultation, Normal air movement, Diminished, 


   Negative: Rales, Rhonchi, Wheezing


Heart Exam:  Positive: Rate Normal, Normal S1, Normal S2


Telemetry:  Positive: No significant arrhythmia


Abdomen Exam:  Positive: Normal bowel sounds, Soft, 


   Negative: Tenderness, Hepatospenomegaly


Extremity Exam:  Positive: Edema (b/l LE, pitting +1 right LE), Normal pulses (

+ strong dorsalis pedis b/l LE), Tenderness (right LE, from knee down), 

Swelling (b/l LE, worse on right LE +1 pitting), 


   Negative: Clubbing, Cyanosis


Skin Exam:  Positive: Breakdown (dorsal aspect of right foot shows grade 3 

diabetic ulcer, non-exudative, about 1 in by 1 in, non-bleeding), 


   Negative: Rash


Neuro Exam:  Positive: Normal Speech


Psych Exam:  Positive: Oriented x 3





Vital Signs





Vital Signs








  Date Time  Temp Pulse Resp B/P (MAP) Pulse Ox O2 Delivery O2 Flow Rate FiO2


 


10/12/17 01:28        


 


10/12/17 01:12 98.9 76 18  97 Room Air  











Laboratory Data


Labs 24H


Laboratory Tests 2


10/12/17 01:38: 


Immature Granulocyte % (Auto) 0.4H, White Blood Count 6.8, Red Blood Count 3.91L

, Hemoglobin 10.4L, Hematocrit 32.4L, Mean Corpuscular Volume 82.9, Mean 

Corpuscular Hemoglobin 26.6L, Mean Corpuscular Hemoglobin Concent 32.1, Red 

Cell Distribution Width 15.2H, Platelet Count 226, Neutrophils (%) (Auto) 55.4, 

Lymphocytes (%) (Auto) 25.3, Monocytes (%) (Auto) 8.6H, Eosinophils (%) (Auto) 

9.9H, Basophils (%) (Auto) 0.4, Neutrophils # (Auto) 3.8, Lymphocytes # (Auto) 

1.7, Monocytes # (Auto) 0.6, Eosinophils # (Auto) 0.7H, Basophils # (Auto) 0.0, 

Immature Granulocyte # (Auto) 0.0, Nucleated Red Blood Cells % (auto) 0.0


CBC/BMP


Laboratory Tests


10/12/17 01:38








Red Blood Count 3.91 L, Mean Corpuscular Volume 82.9, Mean Corpuscular 

Hemoglobin 26.6 L, Mean Corpuscular Hemoglobin Concent 32.1, Red Cell 

Distribution Width 15.2 H, Neutrophils (%) (Auto) 55.4, Lymphocytes (%) (Auto) 

25.3, Monocytes (%) (Auto) 8.6 H, Eosinophils (%) (Auto) 9.9 H, Basophils (%) (

Auto) 0.4, Neutrophils # (Auto) 3.8, Lymphocytes # (Auto) 1.7, Monocytes # (Auto

) 0.6, Eosinophils # (Auto) 0.7 H, Basophils # (Auto) 0.0


Microbiology





Microbiology


10/12/17 Blood Culture, Received


           Pending





Problems





(1) Cellulitis of right leg


Status:  Acute


Response to Treatment:  Stable


Problem Text:  will cx podiatry in AM


wound and blood cx pending


pt afebrile and no white count


pt has hx of pseudomonas, MRSA and E. faecalis- will tx. with meropenem and 

vanco. 


morphine for pain control 


CRP and ESR elevated


MRI w/o contrast of right LE to r/o osteo.-pending





(2) Diastolic CHF


Status:  Resolved


Response to Treatment:  Stable


Problem Text:  c/w home lasix





(3) CKD (chronic kidney disease), stage III


Status:  Chronic


Response to Treatment:  Stable


Problem Text:  creatine 1.56 seems to be at baseline





(4) Diabetes


Status:  Chronic


Response to Treatment:  Stable


Problem Text:  sliding scale insulin coverage


ED glucose 





(5) HTN (hypertension)


Status:  Chronic


Response to Treatment:  Stable


Problem Text:  c/w home medication





(6) Diabetic neuropathy


Status:  Chronic


Response to Treatment:  Stable


Problem Text:  will c/w home gabapentin





(7) DVT prophylaxis


Status:  Acute


Response to Treatment:  Stable


Problem Text:  heparin in light of pts kidney function








Plan / VTE


VTE Prophylaxis Ordered?:  Yes





GME ATTESTATION


GME ATTESTATION


My preceptor for this patient encounter was physically present in the building 

during the encounter and was fully available. As needed, all aspects of the 

patient interview, examination, medical decision making process, and medical 

care plan development were reviewed and approved by the preceptor. Preceptor is 

aware and concurs with the plan as stated in the body of this note and will 

attest to such by his/her cosignature.





ATTENDING NOTE


I have both independently examined this patient as well as reviewed the H&P.  I 

have discussed in detail with the resident the findings and plan of treatment 

as documented in the residents note. I will continue to follow the patient and 

offer further guidance to the patients care as necessary during this hospital 

stay.


KIT Fitzgerald MD, DO Oct 12, 2017 02:03


LINA ELIZONDO MD Oct 12, 2017 19:00

## 2017-10-12 NOTE — PHACANCOPD
PHARMACY VANCOMYCIN DOSING


Pt Demographics


Demographics


Patient Age:62 , Weight:136.360  , Gender: male


Adjusted Body Weight


Date: 10/12/17, Adjusted Body Weight: [101] Kg





Events Past 24 Hours


Events Past 24 Hours:  NO: Dialysis, Diuretic Therapy, Change in CrCl, Fever, 

Elevation in WBC, Pending Diagnostics, Pending Procedures, Other





Vancomycin


Vancomycin Target Ranges:  15-20 mcg/ml


Vancomycin Load Y/N:  Yes


Load Dose Date Time


Vancomycin Load Dose:   2000MG      Date:  10-12       Time:    0500


Vancomycin Dose


Date: 10/12/17. Current Vancomycin Dose: [1000MG Q12H]


Intermittent Dosing?:  No





Labs


Labs











Item Value  Date Time


 


White Blood Count 10.7 K/mm3 H 5/8/17 1804


 


White Blood Count 6.8 10^3/uL 10/12/17 0138


 


Creatinine 1.56 MG/DL H 10/12/17 0138


 


Blood Urea Nitrogen 27 MG/DL H 10/12/17 0138











 Vital Signs








Label Value  Date Time


 


Patient Temperature 98.0 degrees F 10/12/17 0414


 


Temperature Source Temporal 10/12/17 0414








Micro





Microbiology


10/12/17 Blood Culture, Received


           Pending


10/12/17 Blood Culture, Received


           Pending


Creatinine Clearance


Date:10/12/17. Creatinine Clearance: [55].


Pending Labs


Trough 10-13 @1600





Assessment and Plan


Maintaining Current Dose?:  Yes


Reason for dose change:  No Dose Change





Pharmacist Note


Pharmacist Note


Date: 10/12/17. Pharmacist note:Dosed at 1000mg q12h with a trough ordered for 

10-13 @1600.  Will continue to monitor and make adjustments as needed.











PRAVEEN MCKEON PHARM[facility] Oct 12, 2017 04:35

## 2017-10-12 NOTE — CR
DATE OF CONSULTATION: 10/12/2017

 

Asked to consult by the hospitalist for evaluation of right lower extremity

cellulitis and MRI findings suggestive of myelitis of the cuboid and 5th

metatarsal.

 

HISTORY OF PRESENT ILLNESS: Mr. Knowles is very well known to me from multiple

previous admissions.  He was last seen by myself 3 weeks ago on September 25,

when he was doing very well.  He has been off antibiotic for the past 2 months

since the middle of August.  The patient has a history of recurrent

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

and abscesses. Multiple surgeries done by Dr. Saravia over the past 10 months.

He had done very well with decrease in his wound to 2 x 3 cm, decreased swelling

of his leg, when he woke up this morning with significant lower extremity

swelling, pain and redness of the right calf.  He came to the emergency room. He

had an MRI done, which showed findings suggestive of osteomyelitis of the cuboid

and the rest of the metatarsal.  The patient is scheduled to go to the operating

room with Dr. Manjit brown.  He complains of being hungry. He has no fever

or chills.  No nausea, vomiting, or diarrhea.  No abdominal pain. His sugars 
have

been very well controlled.  His most recent A1c done in September was 6.4, on

Levemir twice a day, 8 units.  The patient was started on intravenous (IV)

vancomycin and meropenem with decrease in swelling.

 

PAST MEDICAL HISTORY:

Significant for:

1.  Morbid obesity.

2.  Insulin-dependent diabetes with diabetic neuropathy.

3.  Hypertension.

4.  Restrictive lung disease.

5.  Diastolic congestive heart failure.

6.  Severe pulmonary hypertension with cor pulmonale.

7.  Bilateral lymphedema from obesity.

8.  Benign prostatic hypertrophy.

9.  History of left testicular torsion with resection.

10.  MRSA. Resection from MRSA infection.

 

PAST SURGICAL HISTORY:

1.  Amputation of the 3rd to 5th toe February 2016 in [place] from gangrene.

2.  Percutaneous coronary intervention in 2009.

3.  Left testicular resection.

4.  Multiple incision and drainages of the right foot by Dr. Saravia for MRSA

infection.

5.  Appendectomy.

6.  Thyroidectomy.

 

SOCIAL HISTORY: Quit smoking in 2016. Smoked a pack a day for 20 years.  No

alcohol or drug use.  He just moved to the [facility]. He is not

. He lives alone. He has visiting nurses doing his dressing changes three

times a week.

 

REVIEW OF SYSTEMS: He has some chills and malaise but no fever, night sweats.  
No

nausea, vomiting, or diarrhea.  No abdominal pain. He complained of right leg

swelling.  No dyspnea, cough, or chest pain.

 

PHYSICAL EXAMINATION:

He is a healthy-looking pleasant man in no acute distress.

Temperature is 97.8, pulse 70, respirations 18, blood pressure 136/77, oxygen

saturation 94% on room air.

HEART:  Normal S1, S2 with no murmurs, rubs, or gallops.

LUNGS:  Clear.  No wheezes, rales, or rhonchi.

ABDOMEN:  Morbidly obese, soft, nontender.

BACK: No costovertebral angle (CVA) tenderness.

EXTREMITIES:  Right calf with +2 pitting edema. Redness all the way to below the

knee.  Right foot has first two toes, the second toe is deviated to the right

 He has an ulcer measuring 3 x 2 cm, plantar aspect of the

foot. Clean with granulation tissue.  Minimal bloody discharge.  Minimal

tenderness to the foot, and redness all the way to the knee.

NEUROLOGIC:  Normal.

 

LABORATORY DATA:

White count is 6.3, hemoglobin 11, hematocrit 34.2, platelets 226, 60%

neutrophils, 20% lymphocytes, 7% monocytes, 10% eosinophils.  ESR 71.

 

Sodium 136, potassium 4.6, chloride 103, bicarbonate 26, BUN 26, creatinine 1.47
,

glucose 116, calcium 8.8, CRP 1.67.  Two months ago CRP was 0.5 when antibiotics

were discontinued.

 

MRI findings: Highly suspicious for osteomyelitis in the remaining 5th 
metatarsal

and possibly adjacent cuboid. Cannot exclude septic arthritis of the 2nd

metatarsophalangeal (MTP) joint with osteomyelitis. Subluxation of this

articulation also consistent with neuropathic joint disease.

 

Vascular ultrasound: No evidence of deep vein thrombosis (DVT) in the right 
lower

extremity. Mildly enlarged lymph nodes in the right groin, probably benign and

reactive from the chronic infection.

 

MRI was done in May 2017, and there was no evidence of osteomyelitis at that

point.

 

IMPRESSION:

This is a 62-year-old gentleman with a history of insulin-dependent diabetes who

was admitted with cellulitis of the right lower extremity. MRI findings

consistent with residual osteomyelitis of the 5th metatarsal and possibly the

cuboid bone.  The patient has been started on vancomycin and meropenem and is

going back to the operating room with Dr. Saravia for corticotomy of the 5th

metatarsal and debridement of the cuboid bone for cultures.  He also has right

lower extremity cellulitis extending to the knee, which has improved with IV

vancomycin and meropenem.

 

PLAN:  Continue current antibiotics at this point, although I suspect he will

only need gram-positive coverage. Will wait for results of intraoperative

cultures done by Dr. Saravia to decide on course of treatment and antibiotic.

Whether he will need IV antibiotics will depend on operative findings of the

cuboid bone.  Will discuss the case with Dr. Saravia in the morning.

 

Thank you for consultation

AVELINO

## 2017-10-12 NOTE — REP
MRI of right foot without followed by with IV contrast:

 

History:  Question osteomyelitis.

 

Comparison MRI of the right foot May 8, 2017.  Comparison radiographs May 10,

2017.

 

Findings:  The patient is status post transmetatarsal amputation of the digits

three, four, and five as previously noted.  There appears to be a smaller portion

of the proximal end of the fifth metatarsal compared to the prior study.  This

remaining fifth metatarsal bone shows abnormal low T1 and high T2 signal and

contrast enhancement consistent with osteomyelitis.  There is also an area of low

T1 high T2 signal in the lateral aspect of the cuboid.  No other abnormal tarsal

signal intensity is seen.

 

In addition, there is low T1 and high T2 signal intensity with cortical

destruction and contrast enhancement on both sides of the second MTP joint.  This

is a new finding compared with the prior study and I cannot exclude second MTP

joint septic arthritis and osteomyelitis.  No soft tissue abscess is seen.

 

Impression:

 

Signal intensity changes highly suspicious for osteomyelitis in the remaining

fifth metatarsal and possibly in the adjacent cuboid.  Cannot exclude septic

arthritis of the second MTP joint with osteomyelitis on both sides of it.  There

is subluxation of this articulation and another possibility would be progressive

neuropathic joint disease.  No soft tissue abscess is seen.

 

 

Signed by

Gulshan Wellington MD 10/12/2017 11:58 A

## 2017-10-12 NOTE — CR
DATE OF CONSULTATION:  10/12/2017

 

CHIEF COMPLAINT:  A 62-year-old seen for evaluation of swelling of his right

lower extremity.  The patient states it has improved since he has been admitted

last night.  He is seen today for evaluation.

 

PAST MEDICAL HISTORY:

1.  Chronic diabetic foot ulcer.

2.  Partial amputation of his right foot.

3.  Morbid obesity.

4.  Hypertension.

5.  Restrictive lung disease.

6.  Sleep apnea.

7.  Chronic kidney disease stage III.

8.  Benign prostatic hypertrophy.

 

PAST SURGICAL HISTORY:

1.  Left testicular resection.

2.  Percutaneous coronary intervention.

3.  Status post amputation 3rd, 4th, and 5th rays.

 

PHYSICAL EXAMINATION:  Of the patient's extremity reveals an ulceration on the

plantar surface measuring 2 cm x 3 cm with a yellow granulation to sporadic

granulation tissue base.  The ulcer extends down to bone on the more proximal

margin.  No localized discharge or erythema at the ulcer, but there is swelling

of the right lower extremity with no calf tenderness.  Venous duplex scan is

negative for deep vein thrombosis.  MRI questions osteomyelitis of the cuboid and

5th metatarsal.  He has had some bony resection in that area in the past.  No

abscess formation.

 

LABORATORY STUDIES:  Reviewed.  Reveal a white count of 6.3, ESR of 71,

C-reactive protein of 1.67.

 

ASSESSMENT:  Is osteitis with cellulitis of his right lower extremity.

 

PLAN:  We discussed with the patient debridement and corticotomy of the 5th

metatarsal and cuboid with pulse lavage irrigation.  His questions were answered.

This will be performed later tonight.  He is nothing by mouth, and his last food

was at 10 this morning.

## 2017-10-13 VITALS — SYSTOLIC BLOOD PRESSURE: 130 MMHG | DIASTOLIC BLOOD PRESSURE: 80 MMHG

## 2017-10-13 VITALS — SYSTOLIC BLOOD PRESSURE: 132 MMHG | DIASTOLIC BLOOD PRESSURE: 71 MMHG

## 2017-10-13 VITALS — DIASTOLIC BLOOD PRESSURE: 72 MMHG | SYSTOLIC BLOOD PRESSURE: 155 MMHG

## 2017-10-13 VITALS — SYSTOLIC BLOOD PRESSURE: 140 MMHG | DIASTOLIC BLOOD PRESSURE: 94 MMHG

## 2017-10-13 VITALS — DIASTOLIC BLOOD PRESSURE: 86 MMHG | SYSTOLIC BLOOD PRESSURE: 135 MMHG

## 2017-10-13 VITALS — DIASTOLIC BLOOD PRESSURE: 79 MMHG | SYSTOLIC BLOOD PRESSURE: 140 MMHG

## 2017-10-13 LAB
ANION GAP SERPL CALC-SCNC: 2 MEQ/L (ref 8–16)
BASOPHILS # BLD AUTO: 0 10^3/UL (ref 0–0.2)
BASOPHILS NFR BLD AUTO: 0.5 % (ref 0–1)
BUN SERPL-MCNC: 25 MG/DL (ref 7–18)
CALCIUM SERPL-MCNC: 8.5 MG/DL (ref 8.8–10.2)
CHLORIDE SERPL-SCNC: 101 MEQ/L (ref 98–107)
CO2 SERPL-SCNC: 33 MEQ/L (ref 21–32)
CREAT SERPL-MCNC: 1.6 MG/DL (ref 0.7–1.3)
EOSINOPHIL # BLD AUTO: 0.6 10^3/UL (ref 0–0.5)
EOSINOPHIL NFR BLD AUTO: 10.5 % (ref 0–3)
ERYTHROCYTE [DISTWIDTH] IN BLOOD BY AUTOMATED COUNT: 14.9 % (ref 11.5–14.5)
GFR SERPL CREATININE-BSD FRML MDRD: 47.2 ML/MIN/{1.73_M2} (ref 49–?)
GLUCOSE SERPL-MCNC: 103 MG/DL (ref 80–110)
IMM GRANULOCYTES NFR BLD: 0.3 % (ref 0–0)
LYMPHOCYTES # BLD AUTO: 0.5 10^3/UL (ref 1.5–4.5)
LYMPHOCYTES NFR BLD AUTO: 8.2 % (ref 24–44)
MAGNESIUM SERPL-MCNC: 1.7 MG/DL (ref 1.8–2.4)
MCH RBC QN AUTO: 26.5 PG (ref 27–33)
MCHC RBC AUTO-ENTMCNC: 31.7 G/DL (ref 32–36.5)
MCV RBC AUTO: 83.6 FL (ref 80–96)
MONOCYTES # BLD AUTO: 0.3 10^3/UL (ref 0–0.8)
MONOCYTES NFR BLD AUTO: 5.4 % (ref 0–5)
NEUTROPHILS # BLD AUTO: 4.3 10^3/UL (ref 1.8–7.7)
NEUTROPHILS NFR BLD AUTO: 75.1 % (ref 36–66)
NRBC BLD AUTO-RTO: 0 % (ref 0–0)
PLATELET # BLD AUTO: 209 10^3/UL (ref 150–450)
POTASSIUM SERPL-SCNC: 4.8 MEQ/L (ref 3.5–5.1)
SODIUM SERPL-SCNC: 136 MEQ/L (ref 136–145)
WBC # BLD AUTO: 5.7 10^3/UL (ref 4–10)

## 2017-10-13 RX ADMIN — DOCUSATE SODIUM,SENNOSIDES SCH TAB: 50; 8.6 TABLET, FILM COATED ORAL at 09:09

## 2017-10-13 RX ADMIN — DOCUSATE SODIUM,SENNOSIDES SCH TAB: 50; 8.6 TABLET, FILM COATED ORAL at 21:21

## 2017-10-13 RX ADMIN — MAGNESIUM SULFATE IN DEXTROSE SCH MLS/HR: 10 INJECTION, SOLUTION INTRAVENOUS at 09:07

## 2017-10-13 RX ADMIN — SODIUM CHLORIDE SCH UNITS: 4.5 INJECTION, SOLUTION INTRAVENOUS at 09:00

## 2017-10-13 RX ADMIN — HYDRALAZINE HYDROCHLORIDE SCH MG: 25 TABLET, FILM COATED ORAL at 21:21

## 2017-10-13 RX ADMIN — GABAPENTIN SCH MG: 300 CAPSULE ORAL at 14:52

## 2017-10-13 RX ADMIN — ASPIRIN SCH MG: 81 TABLET ORAL at 09:09

## 2017-10-13 RX ADMIN — INSULIN DETEMIR SCH UNITS: 100 INJECTION, SOLUTION SUBCUTANEOUS at 09:00

## 2017-10-13 RX ADMIN — INSULIN LISPRO SCH UNITS: 100 INJECTION, SOLUTION INTRAVENOUS; SUBCUTANEOUS at 12:36

## 2017-10-13 RX ADMIN — HYDRALAZINE HYDROCHLORIDE SCH MG: 25 TABLET, FILM COATED ORAL at 14:53

## 2017-10-13 RX ADMIN — GABAPENTIN SCH MG: 300 CAPSULE ORAL at 21:21

## 2017-10-13 RX ADMIN — SODIUM CHLORIDE SCH MLS/HR: 9 INJECTION, SOLUTION INTRAVENOUS at 06:11

## 2017-10-13 RX ADMIN — DEXTROSE MONOHYDRATE SCH MLS/HR: 50 INJECTION, SOLUTION INTRAVENOUS at 05:12

## 2017-10-13 RX ADMIN — HYDRALAZINE HYDROCHLORIDE SCH MG: 25 TABLET, FILM COATED ORAL at 06:00

## 2017-10-13 RX ADMIN — MORPHINE SULFATE PRN MG: 2 INJECTION, SOLUTION INTRAMUSCULAR; INTRAVENOUS at 02:52

## 2017-10-13 RX ADMIN — INSULIN DETEMIR SCH UNITS: 100 INJECTION, SOLUTION SUBCUTANEOUS at 21:21

## 2017-10-13 RX ADMIN — INSULIN LISPRO SCH UNITS: 100 INJECTION, SOLUTION INTRAVENOUS; SUBCUTANEOUS at 20:34

## 2017-10-13 RX ADMIN — DEXTROSE MONOHYDRATE SCH MLS/HR: 50 INJECTION, SOLUTION INTRAVENOUS at 17:06

## 2017-10-13 RX ADMIN — FUROSEMIDE SCH MG: 80 TABLET ORAL at 09:09

## 2017-10-13 RX ADMIN — ATORVASTATIN CALCIUM SCH MG: 20 TABLET, FILM COATED ORAL at 09:08

## 2017-10-13 RX ADMIN — INSULIN LISPRO SCH UNITS: 100 INJECTION, SOLUTION INTRAVENOUS; SUBCUTANEOUS at 07:30

## 2017-10-13 RX ADMIN — INSULIN LISPRO SCH UNITS: 100 INJECTION, SOLUTION INTRAVENOUS; SUBCUTANEOUS at 17:06

## 2017-10-13 RX ADMIN — MAGNESIUM SULFATE IN DEXTROSE SCH MLS/HR: 10 INJECTION, SOLUTION INTRAVENOUS at 07:52

## 2017-10-13 RX ADMIN — SODIUM CHLORIDE SCH MLS/HR: 9 INJECTION, SOLUTION INTRAVENOUS at 18:56

## 2017-10-13 RX ADMIN — GABAPENTIN SCH MG: 300 CAPSULE ORAL at 09:08

## 2017-10-13 RX ADMIN — LISINOPRIL SCH MG: 5 TABLET ORAL at 21:21

## 2017-10-13 RX ADMIN — LISINOPRIL SCH MG: 5 TABLET ORAL at 09:09

## 2017-10-13 RX ADMIN — SODIUM CHLORIDE SCH UNITS: 4.5 INJECTION, SOLUTION INTRAVENOUS at 20:28

## 2017-10-13 NOTE — IPNPDOC
Date Seen


The patient was seen on 10/13/17.





Progress Note


Hospitalist Progress Note





Subjective: Patient states that he feels better than when he arrived in his leg 

is less swollen





Objective:





Physical Exam:


Vitals: 


Vital Sign - Last 24 Hours








 10/12/17 10/12/17 10/12/17 10/12/17





 01:12 01:28 01:38 01:53


 


Temp 98.9   


 


Pulse 76  68 66


 


Resp 18   


 


B/P (MAP) 189/93 (125)   


 


Pulse Ox 97  97 98


 


O2 Delivery Room Air   


 


    





 10/12/17 10/12/17 10/12/17 10/12/17





 02:08 02:23 02:38 02:53


 


Pulse 64 64 68 70


 


Pulse Ox 94 94 97 97





 10/12/17 10/12/17 10/12/17 10/12/17





 03:08 03:19 03:23 03:38


 


Pulse 64  66 64


 


B/P (MAP)  200/91 (127)  


 


Pulse Ox 95  95 98





 10/12/17 10/12/17 10/12/17 10/12/17





 03:41 03:49 04:14 04:30


 


Temp  98.1 98.0 97.8


 


Pulse  67 69 65


 


Resp  18 18 18


 


B/P (MAP) 182/88 (119) 184/91 (122) 168/82 (110) 130/75 (93)


 


Pulse Ox  96 96 97


 


O2 Delivery  Room Air Room Air Room Air


 


    





 10/12/17 10/12/17 10/12/17 10/12/17





 04:54 05:34 06:38 09:44


 


Resp 20 20  


 


B/P (MAP)   132/68 132/68





 10/12/17   





 14:00   


 


B/P (MAP) 134/84   








General: Awake, alert, no acute distress


HEENT: Normocephalic, atraumatic, extraocular movements intact


CV: Regular rate and rhythm


Lungs: Clear to auscultation bilaterally


Abd: Soft, nontender, nondistended


Extremities: Right lower extremity is extremely swollen and erythematous, pedal 

pulses intact bilaterally


Neuro: Alert and oriented 3, normal speech


Psych: Normal Mood and affect





Labs and Imaging:


Laboratory Tests


10/13/17 05:49








Red Blood Count 4.08 L, Mean Corpuscular Volume 83.6, Mean Corpuscular 

Hemoglobin 26.5 L, Mean Corpuscular Hemoglobin Concent 31.7 L, Red Cell 

Distribution Width 14.9 H, Neutrophils (%) (Auto) 75.1 H, Lymphocytes (%) (Auto

) 8.2 L, Monocytes (%) (Auto) 5.4 H, Eosinophils (%) (Auto) 10.5 H, Basophils (%

) (Auto) 0.5, Neutrophils # (Auto) 4.3, Lymphocytes # (Auto) 0.5 L, Monocytes # 

(Auto) 0.3, Eosinophils # (Auto) 0.6 H, Basophils # (Auto) 0.0, Calcium Level 

8.5 L











Assessment and Plan:





62-year-old male with chronic diabetic foot ulcer, chronic venostasis of the 

bilateral lower extremities, recurrent lower extremity cellulitis, chronic 

diastolic CHF with right heart failure, CKD stage III, hypertension, morbid 

obesity, diabetes mellitus type 2 with neuropathy, CAD status post PCI who is 

admitted with cellulitis of the right lower extremity and concern for potential 

osteomyelitis.





1. Cellulitis of the right lower extremity, possible osteomyelitis: The patient 

is afebrile with a normal white count, but his right lower extremity is quite 

impressive clinically. The patient has been following with Dr. Saravia and 

Dr. Banerjee as an outpatient, and both providers have graciously agreed to 

evaluate the patient in the hospital. MRI of the right lower extremity is 

highly suspicious for osteomyelitis in the remaining fifth metatarsal as well 

as the adjacent cuboid. It also cannot exclude septic arthritis of the second 

MTP joint with osteomyelitis on both sides. He is now s/p debridement and 

lavage in the OR with Dr. Saravia on 10/12, and per Dr. Banerjee's recommndations

, at this time, we will continue the patient on vancomycin and Merrem while we 

await blood and wound cultures.





2. Chronic diastolic CHF with right heart failure: Patient currently appears 

euvolemic. We'll continue his home by mouth Lasix.





3. CKD stage III: The patient's baseline creatinine appears to be in the mid 

ones. He is currently at baseline. We will continue to monitor.





4. Hypertension: Continue home Lasix, and ACE inhibitor. Hydralazine was added 

on admission as the patient's BP was quite elevated, and he has had good 

response to this.





5. Diabetes mellitus type 2 with neuropathy: Continue home gabapentin and home 

Levemir. Sliding scale insulin while in house.





6. CAD status post PCI: No current chest pain. Continue home aspirin, statin. 

The patient is not on a beta blocker at baseline, presumably secondary to his 

baseline heart rate in the 60s.





DVT prophylaxis: Heparin





Dispo: pending cultures





VS, I&O, 24H, Bob


Vital Signs/I&O





Vital Signs








  Date Time  Temp Pulse Resp B/P (MAP) Pulse Ox O2 Delivery O2 Flow Rate FiO2


 


10/13/17 09:09    135/86    


 


10/13/17 06:00 98.2 79 19  95 Room Air  














I&O- Last 24 Hours up to 6 AM 


 


 10/14/17





 06:00


 


Intake Total 100 ml


 


Balance 100 ml











Laboratory Data


24H LABS


Laboratory Tests 2


10/12/17 16:40: Bedside Glucose (Misc Panel) 106


10/12/17 20:38: Bedside Glucose (Misc Panel) 117H


10/13/17 05:49: 


Immature Granulocyte % (Auto) 0.3H, White Blood Count 5.7, Red Blood Count 4.08L

, Hemoglobin 10.8L, Hematocrit 34.1L, Mean Corpuscular Volume 83.6, Mean 

Corpuscular Hemoglobin 26.5L, Mean Corpuscular Hemoglobin Concent 31.7L, Red 

Cell Distribution Width 14.9H, Platelet Count 209, Neutrophils (%) (Auto) 75.1H

, Lymphocytes (%) (Auto) 8.2L, Monocytes (%) (Auto) 5.4H, Eosinophils (%) (Auto

) 10.5H, Basophils (%) (Auto) 0.5, Neutrophils # (Auto) 4.3, Lymphocytes # (Auto

) 0.5L, Monocytes # (Auto) 0.3, Eosinophils # (Auto) 0.6H, Basophils # (Auto) 

0.0, Immature Granulocyte # (Auto) 0.0, Nucleated Red Blood Cells % (auto) 0.0, 

Anion Gap 2L, Glomerular Filtration Rate 47.2L, Blood Urea Nitrogen 25H, 

Creatinine 1.60H, Sodium Level 136, Potassium Level 4.8, Chloride Level 101, 

Carbon Dioxide Level 33H, Calcium Level 8.5L, Magnesium Level 1.7L, C-Reactive 

Protein, Quantitative 1.55H


10/13/17 11:35: Bedside Glucose (Misc Panel) 127H


CBC/BMP


Laboratory Tests


10/13/17 05:49








Red Blood Count 4.08 L, Mean Corpuscular Volume 83.6, Mean Corpuscular 

Hemoglobin 26.5 L, Mean Corpuscular Hemoglobin Concent 31.7 L, Red Cell 

Distribution Width 14.9 H, Neutrophils (%) (Auto) 75.1 H, Lymphocytes (%) (Auto

) 8.2 L, Monocytes (%) (Auto) 5.4 H, Eosinophils (%) (Auto) 10.5 H, Basophils (%

) (Auto) 0.5, Neutrophils # (Auto) 4.3, Lymphocytes # (Auto) 0.5 L, Monocytes # 

(Auto) 0.3, Eosinophils # (Auto) 0.6 H, Basophils # (Auto) 0.0, Calcium Level 

8.5 L


Microbiology





Microbiology


10/12/17 Blood Culture - Preliminary, Resulted


           No growth after 24 hours . All specim...


10/12/17 Blood Culture - Preliminary, Resulted


           No growth after 24 hours . All specim...


10/12/17 Wound Culture, Received


           Pending


10/12/17 Wound Culture, Received


           Pending


10/12/17 Anaerobic Culture, Received


           Pending











NAVARRO SHIELDS Oct 13, 2017 12:59

## 2017-10-13 NOTE — RO
DATE OF PROCEDURE:  10/12/2017

 

PREPROCEDURE DIAGNOSIS: Stage IV ulceration with possible osteomyelitis right

foot.

 

POSTPROCEDURE DIAGNOSIS:  Stage IV ulceration with possible osteomyelitis right

foot.

 

PROCEDURE:  Corticotomy fifth metatarsal and cuboid right foot with insertion of

Iodoform gauze with low pressure pulse lavage irrigation.

 

SURGEON: Dr. Dameon Saravia DPM

 

ASSISTANT:

 

ANESTHESIA: Local MAC.

 

ESTIMATED BLOOD LOSS:  Less than 25 mL.

 

HEMOSTASIS:  None.

 

DESCRIPTION OF OPERATION:  On 10/12/2017, this 62-year old male was taken from

his hospital room to the operating room and placed on the operating table in

supine position. Following induction of IV sedation, local and regional

anesthesia, attention was directed to the patient's right foot and the following

procedure was performed:

 

CORTICOTOMY FIFTH METATARSAL AND CUBOID RIGHT FOOT:

Attention was directed to the patient's right foot where there was noted to be an

ulcer down to bone.  Utilizing a curved osteotome and mallet, approximately 4 mm

of bone was removed under the ulcer site.  This bone was sent for culture,

aerobic and anaerobic.  The wound was then copiously lavaged with 3 liters of

dilute gentamicin solution with low pressure pulse lavage system.  The wound was

then packed with Iodoform gauze and a dry sterile dressing consisting of ABDs, 4

x 4 and Kerlix.  Patient apparently having tolerated the surgical procedure well

was taken from the operating room to the recovery room for further monitoring by

the anesthesia department.  Patient will be seen after his culture becomes

available.

## 2017-10-13 NOTE — IPN
DATE:  10/13/2017

 

Mr. Knowles seems to be doing very well.  He is sitting at his bedside.  He

states his leg feels much better, less swollen.  He went to the OR yesterday 
with

Dr. Saravia who resected some of the cuboid bone mostly, there was no purulent

discharge.  The bone looked pretty healthy.  He remains afebrile.  No nausea,

vomiting or diarrhea.  No abdominal pain.  No fever or chills.

 

On physical examination, temperature is 98.2, pulse 79, respirations 19, blood

pressure 135/86, oxygen saturation 95% on room air.  Heart:  Normal S1, S2, with

no murmurs.

Abdomen is obese, soft, nontender.  Lungs:  Diminished breath sounds but clear.

Extremities:  +2 pitting edema on the right side, +1 edema on the left side.  
The

dressing was not removed as he is postoperative day #1, less than 12 hours since

surgery.  Cellulitis of right calf and thigh has diminished and swelling has

markedly decreased.

 

LABORATORY DATA:

White count is 5.7, hemoglobin 10.8, hematocrit 34.1, platelets 209, 75%

neutrophils, 8% lymphocytes, 5% monocytes.  Sodium 136, potassium 4.8, chloride

101, bicarbonate 33, BUN 25, creatinine 1.6, glucose 103, calcium 8.5, magnesium

1.5, CRP 1.55.

 

Intraoperative wound cultures, aerobic and anaerobic, are pending.

 

MEDICATIONS:  Currently day #2 of vancomycin and meropenem.

 

IMPRESSION:

1.  Cellulitis of right lower extremity with previous history of osteomyelitis.

The patient went to the operating room (OR) for debridement of the cuboid and 
5th met bone,

mostly the cuboid bone was debrided.  Dr. Saravia felt the bones looked

healthy.  There was no purulent discharge.  Waiting for culture results and

pathology.  Continue in the meantime vancomycin and meropenem.  There will be no

need for IV antibiotics.  The patient could be discharged home on by mouth once

cultures are available.

 

2.  Insulin-dependent diabetes, very well controlled.

 

3.  Hypertension and hyperlipidemia, stable.

 

PLAN:  Continue IV vancomycin and meropenem until results of culture available.

The patient previously had polymicrobial infections, including the

methicillin-resistant Staphylococcus aureus (MRSA) and gram-negatives.

Anticipated discharge hopefully on Monday with by mouth antibiotics

MTDD

## 2017-10-13 NOTE — REP
Right foot series:  Four views.

 

History:  Status post debridement in the OR.

 

Comparison radiographs May 10, 2017.

 

Findings:  There are advanced erosive changes at the 2nd MTP joint with lateral

subluxation.  The erosions are new and are compatible with osteomyelitis.  There

are transmetatarsal amputations of the second and third metatarsals, essentially

unchanged.  There is some periosteal reaction at the distal ends of these two

metatarsals.  The fifth digit has been resected at the proximal metatarsal as

well and there is only a small fragment of residual fifth metatarsal visible.

Today's radiographs demonstrate erosions of the lateral cortices of the proximal

end of the fourth metatarsal and of the lateral surface of the cuboid bone

suggesting the possibility of osteomyelitis here as well.  Vascular calcification

is noted.

 

 

Signed by

Gulshan Wellington MD 10/13/2017 07:23 P

## 2017-10-14 VITALS — DIASTOLIC BLOOD PRESSURE: 78 MMHG | SYSTOLIC BLOOD PRESSURE: 150 MMHG

## 2017-10-14 VITALS — DIASTOLIC BLOOD PRESSURE: 79 MMHG | SYSTOLIC BLOOD PRESSURE: 140 MMHG

## 2017-10-14 VITALS — SYSTOLIC BLOOD PRESSURE: 115 MMHG | DIASTOLIC BLOOD PRESSURE: 54 MMHG

## 2017-10-14 LAB
ANION GAP SERPL CALC-SCNC: 7 MEQ/L (ref 8–16)
BASOPHILS # BLD AUTO: 0 10^3/UL (ref 0–0.2)
BASOPHILS NFR BLD AUTO: 0.6 % (ref 0–1)
BUN SERPL-MCNC: 29 MG/DL (ref 7–18)
CALCIUM SERPL-MCNC: 8.4 MG/DL (ref 8.8–10.2)
CHLORIDE SERPL-SCNC: 101 MEQ/L (ref 98–107)
CO2 SERPL-SCNC: 29 MEQ/L (ref 21–32)
CREAT SERPL-MCNC: 1.77 MG/DL (ref 0.7–1.3)
EOSINOPHIL # BLD AUTO: 0.6 10^3/UL (ref 0–0.5)
EOSINOPHIL NFR BLD AUTO: 11.7 % (ref 0–3)
ERYTHROCYTE [DISTWIDTH] IN BLOOD BY AUTOMATED COUNT: 15 % (ref 11.5–14.5)
GFR SERPL CREATININE-BSD FRML MDRD: 42 ML/MIN/{1.73_M2} (ref 49–?)
GLUCOSE SERPL-MCNC: 103 MG/DL (ref 80–110)
IMM GRANULOCYTES NFR BLD: 0.4 % (ref 0–0)
LYMPHOCYTES # BLD AUTO: 1 10^3/UL (ref 1.5–4.5)
LYMPHOCYTES NFR BLD AUTO: 18.6 % (ref 24–44)
MAGNESIUM SERPL-MCNC: 2 MG/DL (ref 1.8–2.4)
MCH RBC QN AUTO: 26.3 PG (ref 27–33)
MCHC RBC AUTO-ENTMCNC: 31.8 G/DL (ref 32–36.5)
MCV RBC AUTO: 82.6 FL (ref 80–96)
MONOCYTES # BLD AUTO: 0.5 10^3/UL (ref 0–0.8)
MONOCYTES NFR BLD AUTO: 9.6 % (ref 0–5)
NEUTROPHILS # BLD AUTO: 3 10^3/UL (ref 1.8–7.7)
NEUTROPHILS NFR BLD AUTO: 59.1 % (ref 36–66)
NRBC BLD AUTO-RTO: 0 % (ref 0–0)
PLATELET # BLD AUTO: 178 10^3/UL (ref 150–450)
POTASSIUM SERPL-SCNC: 4.5 MEQ/L (ref 3.5–5.1)
SODIUM SERPL-SCNC: 137 MEQ/L (ref 136–145)
WBC # BLD AUTO: 5.1 10^3/UL (ref 4–10)

## 2017-10-14 RX ADMIN — INSULIN DETEMIR SCH UNITS: 100 INJECTION, SOLUTION SUBCUTANEOUS at 09:46

## 2017-10-14 RX ADMIN — GABAPENTIN SCH MG: 300 CAPSULE ORAL at 17:18

## 2017-10-14 RX ADMIN — LISINOPRIL SCH MG: 5 TABLET ORAL at 09:49

## 2017-10-14 RX ADMIN — DOCUSATE SODIUM,SENNOSIDES SCH TAB: 50; 8.6 TABLET, FILM COATED ORAL at 20:31

## 2017-10-14 RX ADMIN — INSULIN LISPRO SCH UNITS: 100 INJECTION, SOLUTION INTRAVENOUS; SUBCUTANEOUS at 12:58

## 2017-10-14 RX ADMIN — LISINOPRIL SCH MG: 5 TABLET ORAL at 20:30

## 2017-10-14 RX ADMIN — HYDRALAZINE HYDROCHLORIDE SCH MG: 25 TABLET, FILM COATED ORAL at 13:16

## 2017-10-14 RX ADMIN — HYDRALAZINE HYDROCHLORIDE SCH MG: 25 TABLET, FILM COATED ORAL at 22:51

## 2017-10-14 RX ADMIN — INSULIN LISPRO SCH UNITS: 100 INJECTION, SOLUTION INTRAVENOUS; SUBCUTANEOUS at 17:18

## 2017-10-14 RX ADMIN — INSULIN DETEMIR SCH UNITS: 100 INJECTION, SOLUTION SUBCUTANEOUS at 20:31

## 2017-10-14 RX ADMIN — DEXTROSE MONOHYDRATE SCH MLS/HR: 50 INJECTION, SOLUTION INTRAVENOUS at 04:49

## 2017-10-14 RX ADMIN — INSULIN LISPRO SCH UNITS: 100 INJECTION, SOLUTION INTRAVENOUS; SUBCUTANEOUS at 20:31

## 2017-10-14 RX ADMIN — INSULIN LISPRO SCH UNITS: 100 INJECTION, SOLUTION INTRAVENOUS; SUBCUTANEOUS at 09:44

## 2017-10-14 RX ADMIN — ASPIRIN SCH MG: 81 TABLET ORAL at 09:50

## 2017-10-14 RX ADMIN — SODIUM CHLORIDE SCH UNITS: 4.5 INJECTION, SOLUTION INTRAVENOUS at 19:50

## 2017-10-14 RX ADMIN — SODIUM CHLORIDE SCH MLS/HR: 9 INJECTION, SOLUTION INTRAVENOUS at 06:16

## 2017-10-14 RX ADMIN — ATORVASTATIN CALCIUM SCH MG: 20 TABLET, FILM COATED ORAL at 09:51

## 2017-10-14 RX ADMIN — DOCUSATE SODIUM,SENNOSIDES SCH TAB: 50; 8.6 TABLET, FILM COATED ORAL at 09:50

## 2017-10-14 RX ADMIN — GABAPENTIN SCH MG: 300 CAPSULE ORAL at 20:31

## 2017-10-14 RX ADMIN — SODIUM CHLORIDE SCH UNITS: 4.5 INJECTION, SOLUTION INTRAVENOUS at 09:00

## 2017-10-14 RX ADMIN — FUROSEMIDE SCH MG: 80 TABLET ORAL at 09:50

## 2017-10-14 RX ADMIN — HYDRALAZINE HYDROCHLORIDE SCH MG: 25 TABLET, FILM COATED ORAL at 06:16

## 2017-10-14 RX ADMIN — DEXTROSE MONOHYDRATE SCH MLS/HR: 50 INJECTION, SOLUTION INTRAVENOUS at 17:19

## 2017-10-14 RX ADMIN — SODIUM CHLORIDE SCH MLS/HR: 9 INJECTION, SOLUTION INTRAVENOUS at 19:05

## 2017-10-14 RX ADMIN — GABAPENTIN SCH MG: 300 CAPSULE ORAL at 09:50

## 2017-10-14 NOTE — IPNPDOC
Date Seen


The patient was seen on 10/14/17.





Progress Note


Hospitalist Progress Note





Subjective: Patient states that the swelling continues to decrease





Objective:





Physical Exam:


Vitals: 


Vital Sign - Last 24 Hours








 10/13/17 10/13/17 10/13/17 10/13/17





 14:00 14:53 20:55 21:21


 


Temp 98.3  99.0 


 


Pulse 90  78 


 


Resp 18  18 


 


B/P (MAP) 140/94 (109) 140/94 155/72 (99) 155/72


 


Pulse Ox 82  94 


 


O2 Delivery   Room Air 


 


    





 10/14/17 10/14/17 10/14/17 10/14/17





 05:25 06:16 09:49 13:16


 


Temp 98.6   


 


Pulse 68   


 


Resp 18   


 


B/P (MAP) 140/79 (99) 140/78 128/72 136/80


 


Pulse Ox 95   


 


O2 Delivery Room Air   








General: Awake, alert, no acute distress


HEENT: Normocephalic, atraumatic, extraocular movements intact


CV: Regular rate and rhythm


Lungs: Clear to auscultation bilaterally


Abd: Soft, nontender, nondistended


Extremities: Right lower extremity is extremely swollen and erythematous, pedal 

pulses intact bilaterally


Neuro: Alert and oriented 3, normal speech


Psych: Normal Mood and affect





Labs and Imaging:


Laboratory Tests


10/14/17 06:08








Red Blood Count 3.96 L, Mean Corpuscular Volume 82.6, Mean Corpuscular 

Hemoglobin 26.3 L, Mean Corpuscular Hemoglobin Concent 31.8 L, Red Cell 

Distribution Width 15.0 H, Neutrophils (%) (Auto) 59.1, Lymphocytes (%) (Auto) 

18.6 L, Monocytes (%) (Auto) 9.6 H, Eosinophils (%) (Auto) 11.7 H, Basophils (%

) (Auto) 0.6, Neutrophils # (Auto) 3.0, Lymphocytes # (Auto) 1.0 L, Monocytes # 

(Auto) 0.5, Eosinophils # (Auto) 0.6 H, Basophils # (Auto) 0.0, Calcium Level 

8.4 L














Assessment and Plan:





62-year-old male with chronic diabetic foot ulcer, chronic venostasis of the 

bilateral lower extremities, recurrent lower extremity cellulitis, chronic 

diastolic CHF with right heart failure, CKD stage III, hypertension, morbid 

obesity, diabetes mellitus type 2 with neuropathy, CAD status post PCI who is 

admitted with cellulitis of the right lower extremity and concern for potential 

osteomyelitis.





1. Cellulitis of the right lower extremity, possible osteomyelitis: The patient 

is afebrile with a normal white count, but his right lower extremity is quite 

impressive clinically. The patient has been following with Dr. Saravia and 

Dr. Banerjee as an outpatient, and both providers have graciously agreed to 

evaluate the patient in the hospital. MRI of the right lower extremity is 

highly suspicious for osteomyelitis in the remaining fifth metatarsal as well 

as the adjacent cuboid. It also cannot exclude septic arthritis of the second 

MTP joint with osteomyelitis on both sides. He is now s/p debridement and 

lavage in the OR with Dr. Saravia on 10/12, and per Dr. Banerjee's recommendations

, at this time, we will continue the patient on vancomycin and Merrem while we 

await intraoperative wound cultures.





2. Chronic diastolic CHF with right heart failure: Patient currently appears 

euvolemic. We'll continue his home by mouth Lasix.  Per patient, he only takes 

40mg daily at home (despite the fact that the med rec says 80mg), so we will 

change him back to 40mg.





3. CKD stage III: The patient's baseline creatinine appears to be in the mid 

ones. He is currently at baseline. We will continue to monitor.





4. Hypertension: Continue home Lasix, and ACE inhibitor. Hydralazine was added 

on admission as the patient's BP was quite elevated, and he has had good 

response to this.





5. Diabetes mellitus type 2 with neuropathy: Continue home gabapentin and home 

Levemir. Sliding scale insulin while in house.





6. CAD status post PCI: No current chest pain. Continue home aspirin, statin. 

The patient is not on a beta blocker at baseline, presumably secondary to his 

baseline heart rate in the 60s.





DVT prophylaxis: Heparin





Dispo: pending cultures





VS, I&O, 24H, Fishbone


Vital Signs/I&O





Vital Signs








  Date Time  Temp Pulse Resp B/P (MAP) Pulse Ox O2 Delivery O2 Flow Rate FiO2


 


10/14/17 13:16    136/80    


 


10/14/17 05:25 98.6 68 18  95 Room Air  














I&O- Last 24 Hours up to 6 AM 


 


 10/15/17





 06:00


 


Intake Total 340 ml


 


Output Total 800 ml


 


Balance -460 ml











Laboratory Data


24H LABS


Laboratory Tests 2


10/13/17 16:11: Vancomycin Level Trough 17.9


10/13/17 16:54: Bedside Glucose (Misc Panel) 120H


10/13/17 20:32: Bedside Glucose (Misc Panel) 133H


10/14/17 06:08: 


Immature Granulocyte % (Auto) 0.4H, White Blood Count 5.1, Red Blood Count 3.96L

, Hemoglobin 10.4L, Hematocrit 32.7L, Mean Corpuscular Volume 82.6, Mean 

Corpuscular Hemoglobin 26.3L, Mean Corpuscular Hemoglobin Concent 31.8L, Red 

Cell Distribution Width 15.0H, Platelet Count 178, Neutrophils (%) (Auto) 59.1, 

Lymphocytes (%) (Auto) 18.6L, Monocytes (%) (Auto) 9.6H, Eosinophils (%) (Auto) 

11.7H, Basophils (%) (Auto) 0.6, Neutrophils # (Auto) 3.0, Lymphocytes # (Auto) 

1.0L, Monocytes # (Auto) 0.5, Eosinophils # (Auto) 0.6H, Basophils # (Auto) 0.0

, Immature Granulocyte # (Auto) 0.0, Nucleated Red Blood Cells % (auto) 0.0, 

Anion Gap 7L, Glomerular Filtration Rate 42.0L, Blood Urea Nitrogen 29H, 

Creatinine 1.77H, Sodium Level 137, Potassium Level 4.5, Chloride Level 101, 

Carbon Dioxide Level 29, Calcium Level 8.4L, Magnesium Level 2.0, C-Reactive 

Protein, Quantitative 2.24H


10/14/17 11:31: Bedside Glucose (Misc Panel) 127H


CBC/BMP


Laboratory Tests


10/14/17 06:08








Red Blood Count 3.96 L, Mean Corpuscular Volume 82.6, Mean Corpuscular 

Hemoglobin 26.3 L, Mean Corpuscular Hemoglobin Concent 31.8 L, Red Cell 

Distribution Width 15.0 H, Neutrophils (%) (Auto) 59.1, Lymphocytes (%) (Auto) 

18.6 L, Monocytes (%) (Auto) 9.6 H, Eosinophils (%) (Auto) 11.7 H, Basophils (%

) (Auto) 0.6, Neutrophils # (Auto) 3.0, Lymphocytes # (Auto) 1.0 L, Monocytes # 

(Auto) 0.5, Eosinophils # (Auto) 0.6 H, Basophils # (Auto) 0.0, Calcium Level 

8.4 L


Microbiology





Microbiology


10/12/17 Blood Culture - Preliminary, Resulted


           No Growth after 48 hours. All Specime...


10/12/17 Blood Culture - Preliminary, Resulted


           No Growth after 48 hours. All Specime...


10/12/17 Wound Culture - Preliminary, Resulted


           Klebsiella Pneumoniae


10/12/17 Wound Culture, Received


           Pending


10/12/17 Anaerobic Culture, Received


           Pending











NAVARRO SHIELDS Oct 14, 2017 14:05

## 2017-10-14 NOTE — PHACANCOPD
PHARMACY VANCOMYCIN DOSING


Pt Demographics


Demographics


Patient Age:62 , Weight:138.400  , Gender: male


Adjusted Body Weight


Date: 10/12/17, Adjusted Body Weight: [101] Kg





Events Past 24 Hours


Events Past 24 Hours:  YES: Change in CrCl





Vancomycin


Vancomycin Target Ranges:  15-20 mcg/ml


Vancomycin Load Y/N:  Yes


Load Dose Date Time


Vancomycin Load Dose:   2000MG      Date:  10-12       Time:    0500


Vancomycin Dose


Date: 10/14/17. Current Vancomycin Dose: [750MG IV Q12H]


Date: 10/12/17. Current Vancomycin Dose: [1000MG Q12H]


Intermittent Dosing?:  No





Labs


Labs











Item Value  Date Time


 


Creatinine 1.56 MG/DL H 10/12/17 0138


 


Creatinine 1.47 MG/DL H 10/12/17 0636


 


Creatinine 1.60 MG/DL H 10/13/17 0549


 


Creatinine 1.77 MG/DL H 10/14/17 0608


 


Vancomycin Level Trough 17.9 UG/ML 10/13/17 1611








Micro





Microbiology


10/12/17 Blood Culture - Preliminary, Resulted


           No Growth after 48 hours. All Specime...


10/12/17 Blood Culture - Preliminary, Resulted


           No Growth after 48 hours. All Specime...


10/12/17 Wound Culture - Preliminary, Resulted


           Klebsiella Pneumoniae


10/12/17 Wound Culture, Received


           Pending


10/12/17 Anaerobic Culture, Received


           Pending


Creatinine Clearance


Date:10/12/17. Creatinine Clearance: [55].


Pending Labs


Trough 10-13 @1600





Assessment and Plan


Maintaining Current Dose?:  No


Reason for dose change:  Change in serum Cr





Pharmacist Note


Pharmacist Note


Date: 10/14/17. Pharmacist note: The patient's serum creatinine continues to 

rise measured today at 1.77mg/dl. The dosing was changed to 750mg IV q12 hours 

starting today @ 17:00. A trough is scheduled 10/15 @16:00 we will continue to 

monitor and adjust dose as needed.





Date: 10/12/17. Pharmacist note:Dosed at 1000mg q12h with a trough ordered for 

10-13 @1600.  Will continue to monitor and make adjustments as needed.











[facility] Oct 14, 2017 14:07

## 2017-10-15 VITALS — SYSTOLIC BLOOD PRESSURE: 148 MMHG | DIASTOLIC BLOOD PRESSURE: 72 MMHG

## 2017-10-15 VITALS — SYSTOLIC BLOOD PRESSURE: 143 MMHG | DIASTOLIC BLOOD PRESSURE: 82 MMHG

## 2017-10-15 LAB
ANION GAP SERPL CALC-SCNC: 6 MEQ/L (ref 8–16)
BASOPHILS # BLD AUTO: 0 10^3/UL (ref 0–0.2)
BASOPHILS NFR BLD AUTO: 0.6 % (ref 0–1)
BUN SERPL-MCNC: 34 MG/DL (ref 7–18)
CALCIUM SERPL-MCNC: 8 MG/DL (ref 8.8–10.2)
CHLORIDE SERPL-SCNC: 99 MEQ/L (ref 98–107)
CO2 SERPL-SCNC: 30 MEQ/L (ref 21–32)
CREAT SERPL-MCNC: 1.8 MG/DL (ref 0.7–1.3)
EOSINOPHIL # BLD AUTO: 0.6 10^3/UL (ref 0–0.5)
EOSINOPHIL NFR BLD AUTO: 12.3 % (ref 0–3)
ERYTHROCYTE [DISTWIDTH] IN BLOOD BY AUTOMATED COUNT: 15.2 % (ref 11.5–14.5)
GFR SERPL CREATININE-BSD FRML MDRD: 41.2 ML/MIN/{1.73_M2} (ref 49–?)
GLUCOSE SERPL-MCNC: 97 MG/DL (ref 80–110)
IMM GRANULOCYTES NFR BLD: 0.4 % (ref 0–0)
LYMPHOCYTES # BLD AUTO: 0.9 10^3/UL (ref 1.5–4.5)
LYMPHOCYTES NFR BLD AUTO: 18 % (ref 24–44)
MAGNESIUM SERPL-MCNC: 1.8 MG/DL (ref 1.8–2.4)
MCH RBC QN AUTO: 26.3 PG (ref 27–33)
MCHC RBC AUTO-ENTMCNC: 31.5 G/DL (ref 32–36.5)
MCV RBC AUTO: 83.4 FL (ref 80–96)
MONOCYTES # BLD AUTO: 0.6 10^3/UL (ref 0–0.8)
MONOCYTES NFR BLD AUTO: 12.8 % (ref 0–5)
NEUTROPHILS # BLD AUTO: 2.8 10^3/UL (ref 1.8–7.7)
NEUTROPHILS NFR BLD AUTO: 55.9 % (ref 36–66)
NRBC BLD AUTO-RTO: 0 % (ref 0–0)
PLATELET # BLD AUTO: 187 10^3/UL (ref 150–450)
POTASSIUM SERPL-SCNC: 4.5 MEQ/L (ref 3.5–5.1)
SODIUM SERPL-SCNC: 135 MEQ/L (ref 136–145)
WBC # BLD AUTO: 4.9 10^3/UL (ref 4–10)

## 2017-10-15 RX ADMIN — INSULIN DETEMIR SCH UNITS: 100 INJECTION, SOLUTION SUBCUTANEOUS at 11:12

## 2017-10-15 RX ADMIN — SODIUM CHLORIDE SCH MLS/HR: 9 INJECTION, SOLUTION INTRAVENOUS at 17:49

## 2017-10-15 RX ADMIN — GABAPENTIN SCH MG: 300 CAPSULE ORAL at 21:02

## 2017-10-15 RX ADMIN — HYDRALAZINE HYDROCHLORIDE SCH MG: 25 TABLET, FILM COATED ORAL at 14:23

## 2017-10-15 RX ADMIN — ATORVASTATIN CALCIUM SCH MG: 20 TABLET, FILM COATED ORAL at 11:13

## 2017-10-15 RX ADMIN — INSULIN LISPRO SCH UNITS: 100 INJECTION, SOLUTION INTRAVENOUS; SUBCUTANEOUS at 12:23

## 2017-10-15 RX ADMIN — LISINOPRIL SCH MG: 5 TABLET ORAL at 21:02

## 2017-10-15 RX ADMIN — GABAPENTIN SCH MG: 300 CAPSULE ORAL at 11:13

## 2017-10-15 RX ADMIN — DOCUSATE SODIUM,SENNOSIDES SCH TAB: 50; 8.6 TABLET, FILM COATED ORAL at 11:13

## 2017-10-15 RX ADMIN — INSULIN LISPRO SCH UNITS: 100 INJECTION, SOLUTION INTRAVENOUS; SUBCUTANEOUS at 17:49

## 2017-10-15 RX ADMIN — DEXTROSE MONOHYDRATE SCH MLS/HR: 50 INJECTION, SOLUTION INTRAVENOUS at 04:40

## 2017-10-15 RX ADMIN — INSULIN DETEMIR SCH UNITS: 100 INJECTION, SOLUTION SUBCUTANEOUS at 21:00

## 2017-10-15 RX ADMIN — GABAPENTIN SCH MG: 300 CAPSULE ORAL at 17:09

## 2017-10-15 RX ADMIN — SODIUM CHLORIDE SCH MLS/HR: 9 INJECTION, SOLUTION INTRAVENOUS at 06:06

## 2017-10-15 RX ADMIN — INSULIN LISPRO SCH UNITS: 100 INJECTION, SOLUTION INTRAVENOUS; SUBCUTANEOUS at 20:44

## 2017-10-15 RX ADMIN — SODIUM CHLORIDE SCH UNITS: 4.5 INJECTION, SOLUTION INTRAVENOUS at 09:00

## 2017-10-15 RX ADMIN — INSULIN LISPRO SCH UNITS: 100 INJECTION, SOLUTION INTRAVENOUS; SUBCUTANEOUS at 07:30

## 2017-10-15 RX ADMIN — HYDRALAZINE HYDROCHLORIDE SCH MG: 25 TABLET, FILM COATED ORAL at 06:06

## 2017-10-15 RX ADMIN — SODIUM CHLORIDE SCH UNITS: 4.5 INJECTION, SOLUTION INTRAVENOUS at 20:44

## 2017-10-15 RX ADMIN — DOCUSATE SODIUM,SENNOSIDES SCH TAB: 50; 8.6 TABLET, FILM COATED ORAL at 21:02

## 2017-10-15 RX ADMIN — HYDRALAZINE HYDROCHLORIDE SCH MG: 25 TABLET, FILM COATED ORAL at 21:03

## 2017-10-15 RX ADMIN — LISINOPRIL SCH MG: 5 TABLET ORAL at 11:13

## 2017-10-15 RX ADMIN — ASPIRIN SCH MG: 81 TABLET ORAL at 11:13

## 2017-10-15 NOTE — IPNPDOC
Date Seen


The patient was seen on 10/15/17.





Progress Note


Hospitalist Progress Note





Subjective: Patient has no complaints





Objective:





Physical Exam:


Vitals: 


Vital Sign - Last 24 Hours








 10/14/17 10/14/17 10/14/17 10/14/17





 13:16 14:00 20:30 20:31


 


Temp  98.9  


 


Pulse  87  


 


Resp  18  18


 


B/P (MAP) 136/80 115/54 (74) 150/70 


 


Pulse Ox  98  


 


    





 10/14/17 10/14/17 10/15/17 10/15/17





 22:00 22:51 06:00 06:06


 


Temp 98.6  98.1 


 


Pulse 86  70 


 


Resp 19  17 


 


B/P (MAP) 150/78 (102) 150/84 143/82 (102) 143/70


 


Pulse Ox 96  95 


 


O2 Delivery Room Air  Room Air 


 


    





 10/15/17   





 11:13   


 


B/P (MAP) 128/88   








General: Awake, alert, no acute distress


HEENT: Normocephalic, atraumatic, extraocular movements intact


CV: Regular rate and rhythm


Lungs: Clear to auscultation bilaterally


Abd: Soft, nontender, nondistended


Extremities: Right lower extremity swelling and erythema are improving, pedal 

pulses intact bilaterally


Neuro: Alert and oriented 3, normal speech


Psych: Normal Mood and affect





Labs and Imaging:


Laboratory Tests


10/15/17 06:24








Red Blood Count 4.15 L, Mean Corpuscular Volume 83.4, Mean Corpuscular 

Hemoglobin 26.3 L, Mean Corpuscular Hemoglobin Concent 31.5 L, Red Cell 

Distribution Width 15.2 H, Neutrophils (%) (Auto) 55.9, Lymphocytes (%) (Auto) 

18.0 L, Monocytes (%) (Auto) 12.8 H, Eosinophils (%) (Auto) 12.3 H, Basophils (%

) (Auto) 0.6, Neutrophils # (Auto) 2.8, Lymphocytes # (Auto) 0.9 L, Monocytes # 

(Auto) 0.6, Eosinophils # (Auto) 0.6 H, Basophils # (Auto) 0.0, Calcium Level 

8.0 L











Assessment and Plan:





62-year-old male with chronic diabetic foot ulcer, chronic venostasis of the 

bilateral lower extremities, recurrent lower extremity cellulitis, chronic 

diastolic CHF with right heart failure, CKD stage III, hypertension, morbid 

obesity, diabetes mellitus type 2 with neuropathy, CAD status post PCI who is 

admitted with cellulitis of the right lower extremity and concern for potential 

osteomyelitis.





1. Cellulitis of the right lower extremity, possible osteomyelitis: The patient 

is afebrile with a normal white count, but his right lower extremity is quite 

impressive clinically. The patient has been following with Dr. Saravia and 

Dr. Banerjee as an outpatient, and both providers have graciously agreed to 

evaluate the patient in the hospital. MRI of the right lower extremity is 

highly suspicious for osteomyelitis in the remaining fifth metatarsal as well 

as the adjacent cuboid. It also cannot exclude septic arthritis of the second 

MTP joint with osteomyelitis on both sides. He is now s/p debridement and 

lavage in the OR with Dr. Saravia on 10/12, and per Dr. Banerjee's recommendations

, at this time, we will continue the patient on vancomycin and Merrem while we 

await intraoperative wound cultures.  From the culture results, it appears that 

he could likely be changed to PO levaquin, but as Dr. Banerjee has been managing 

his cellulitis over time, we will await her recs tomorrow regarding choice of 

PO abx and length of time.





2. Chronic diastolic CHF with right heart failure: Patient currently appears 

euvolemic.  Per patient, he only takes 40mg daily at home (despite the fact 

that the med rec says 80mg), so yesterday we changed him back to 40mg.  As his 

Cr is slightly up, we will hold today's dose of lasix.





3. CKD stage III: The patient's baseline creatinine appears to be in the mid 

ones. Cr is slightly bumped, probably from receiving lasix 80mg daily rather 

than usual 40mg daily for a couple days. We will continue to monitor and hold 

today's lasix dose.





4. Hypertension: Continue home Lasix, and ACE inhibitor. Hydralazine was added 

on admission as the patient's BP was quite elevated, and he has had good 

response to this.





5. Diabetes mellitus type 2 with neuropathy: Continue home gabapentin and home 

Levemir. Sliding scale insulin while in house.





6. CAD status post PCI: No current chest pain. Continue home aspirin, statin. 

The patient is not on a beta blocker at baseline, presumably secondary to his 

baseline heart rate in the 60s.





DVT prophylaxis: Heparin





Dispo: pending final recs from Dr. Banerjee regarding choice of PO antibiotic and 

length of time





VS, I&O, [facility]


Vital Signs/I&O





Vital Signs








  Date Time  Temp Pulse Resp B/P (MAP) Pulse Ox O2 Delivery O2 Flow Rate FiO2


 


10/15/17 11:13    128/88    


 


10/15/17 06:00 98.1 70 17  95 Room Air  














I&O- Last 24 Hours up to 6 AM 


 


 10/16/17





 06:00


 


Intake Total 375 ml


 


Balance 375 ml











Laboratory Data


24H LABS


Laboratory Tests 2


10/14/17 16:41: Bedside Glucose (Misc Panel) 145H


10/14/17 19:52: Bedside Glucose (Misc Panel) 148H


10/15/17 06:24: 


Immature Granulocyte % (Auto) 0.4H, White Blood Count 4.9, Red Blood Count 4.15L

, Hemoglobin 10.9L, Hematocrit 34.6L, Mean Corpuscular Volume 83.4, Mean 

Corpuscular Hemoglobin 26.3L, Mean Corpuscular Hemoglobin Concent 31.5L, Red 

Cell Distribution Width 15.2H, Platelet Count 187, Neutrophils (%) (Auto) 55.9, 

Lymphocytes (%) (Auto) 18.0L, Monocytes (%) (Auto) 12.8H, Eosinophils (%) (Auto

) 12.3H, Basophils (%) (Auto) 0.6, Neutrophils # (Auto) 2.8, Lymphocytes # (Auto

) 0.9L, Monocytes # (Auto) 0.6, Eosinophils # (Auto) 0.6H, Basophils # (Auto) 

0.0, Immature Granulocyte # (Auto) 0.0, Nucleated Red Blood Cells % (auto) 0.0, 

Anion Gap 6L, Glomerular Filtration Rate 41.2L, Blood Urea Nitrogen 34H, 

Creatinine 1.80H, Sodium Level 135L, Potassium Level 4.5, Chloride Level 99, 

Carbon Dioxide Level 30, Calcium Level 8.0L, Magnesium Level 1.8, C-Reactive 

Protein, Quantitative 1.70H


CBC/BMP


Laboratory Tests


10/15/17 06:24








Red Blood Count 4.15 L, Mean Corpuscular Volume 83.4, Mean Corpuscular 

Hemoglobin 26.3 L, Mean Corpuscular Hemoglobin Concent 31.5 L, Red Cell 

Distribution Width 15.2 H, Neutrophils (%) (Auto) 55.9, Lymphocytes (%) (Auto) 

18.0 L, Monocytes (%) (Auto) 12.8 H, Eosinophils (%) (Auto) 12.3 H, Basophils (%

) (Auto) 0.6, Neutrophils # (Auto) 2.8, Lymphocytes # (Auto) 0.9 L, Monocytes # 

(Auto) 0.6, Eosinophils # (Auto) 0.6 H, Basophils # (Auto) 0.0, Calcium Level 

8.0 L


Microbiology





Microbiology


10/12/17 Blood Culture - Preliminary, Resulted


           No Growth after 72 hours. All specime...


10/12/17 Blood Culture - Preliminary, Resulted


           No Growth after 72 hours. All specime...


10/12/17 Wound Culture - Final, Complete


           Alcaligenes Faecalis (Odorans)


           Klebsiella Pneumoniae


           Pseudomonas Aeruginosa


           Corynebacterium Species


10/12/17 Wound Culture - Final, Complete


           Pseudomonas Aeruginosa


           Klebsiella Pneumoniae


           Corynebacterium Species


10/12/17 Anaerobic Culture - Final, Complete











NAVARRO SHIELDS Oct 15, 2017 12:15

## 2017-10-15 NOTE — PHACANCOPD
PHARMACY VANCOMYCIN DOSING


Pt Demographics


Demographics


Patient Age:62 , Weight:137.100  , Gender: male


Adjusted Body Weight


Date: 10/12/17, Adjusted Body Weight: [101] Kg





Events Past 24 Hours


Events Past 24 Hours:  YES: Change in CrCl





Vancomycin


Vancomycin Target Ranges:  15-20 mcg/ml


Vancomycin Load Y/N:  Yes


Load Dose Date Time


Vancomycin Load Dose:   2000MG      Date:  10-12       Time:    0500


Vancomycin Dose


Date: 10/15/17. Current Vancomycin Dose: [1000MG IV Q18H]





Date: 10/14/17. Current Vancomycin Dose: [750MG IV Q12H]


Date: 10/12/17. Current Vancomycin Dose: [1000MG Q12H]


Intermittent Dosing?:  No





Labs


Labs











Item Value  Date Time


 


Vancomycin Level Trough 20.6 UG/ML H 10/15/17 1607


 


Creatinine 1.60 MG/DL H 10/13/17 0549


 


Creatinine 1.77 MG/DL H 10/14/17 0608


 


Creatinine 1.80 MG/DL H 10/15/17 0624








Micro





Microbiology


10/12/17 Blood Culture - Preliminary, Resulted


           No Growth after 72 hours. All specime...


10/12/17 Blood Culture - Preliminary, Resulted


           No Growth after 72 hours. All specime...


10/12/17 Wound Culture - Final, Complete


           Alcaligenes Faecalis (Odorans)


           Klebsiella Pneumoniae


           Pseudomonas Aeruginosa


           Corynebacterium Species


10/12/17 Wound Culture - Final, Complete


           Pseudomonas Aeruginosa


           Klebsiella Pneumoniae


           Corynebacterium Species


10/12/17 Anaerobic Culture - Final, Complete


Creatinine Clearance


Date:10/15/17. Creatinine Clearance: [47.9ML/MIN.].





Date:10/12/17. Creatinine Clearance: [55].


Pending Labs


Trough 10-13 @1600





Assessment and Plan


Maintaining Current Dose?:  No


Reason for dose change:  Trough too high





Pharmacist Note


Pharmacist Note


Date: 10/15/17. Pharmacist note: The trough came back tonight @20.6mcg/ml. The 

dosing will be changed to 1g iv q18h starting tonight @22 to allow some 

medication to clear. We will continue to monitor and adjust dose as needed.





Date: 10/14/17. Pharmacist note: The patient's serum creatinine continues to 

rise measured today at 1.77mg/dl. The dosing was changed to 750mg IV q12 hours 

starting today @ 17:00. A trough is scheduled 10/15 @16:00 we will continue to 

monitor and adjust dose as needed.





Date: 10/12/17. Pharmacist note:Dosed at 1000mg q12h with a trough ordered for 

10-13 @1600.  Will continue to monitor and make adjustments as needed.











[facility] Oct 15, 2017 17:39

## 2017-10-16 VITALS — DIASTOLIC BLOOD PRESSURE: 69 MMHG | SYSTOLIC BLOOD PRESSURE: 127 MMHG

## 2017-10-16 VITALS — SYSTOLIC BLOOD PRESSURE: 142 MMHG | DIASTOLIC BLOOD PRESSURE: 66 MMHG

## 2017-10-16 LAB
ANION GAP SERPL CALC-SCNC: 7 MEQ/L (ref 8–16)
BASOPHILS # BLD AUTO: 0 10^3/UL (ref 0–0.2)
BASOPHILS NFR BLD AUTO: 0.9 % (ref 0–1)
BUN SERPL-MCNC: 34 MG/DL (ref 7–18)
CALCIUM SERPL-MCNC: 8.3 MG/DL (ref 8.8–10.2)
CHLORIDE SERPL-SCNC: 101 MEQ/L (ref 98–107)
CO2 SERPL-SCNC: 28 MEQ/L (ref 21–32)
CREAT SERPL-MCNC: 1.67 MG/DL (ref 0.7–1.3)
EOSINOPHIL # BLD AUTO: 0.7 10^3/UL (ref 0–0.5)
EOSINOPHIL NFR BLD AUTO: 14.4 % (ref 0–3)
ERYTHROCYTE [DISTWIDTH] IN BLOOD BY AUTOMATED COUNT: 15.1 % (ref 11.5–14.5)
GFR SERPL CREATININE-BSD FRML MDRD: 44.9 ML/MIN/{1.73_M2} (ref 49–?)
GLUCOSE SERPL-MCNC: 87 MG/DL (ref 80–110)
IMM GRANULOCYTES NFR BLD: 0.4 % (ref 0–0)
LYMPHOCYTES # BLD AUTO: 0.9 10^3/UL (ref 1.5–4.5)
LYMPHOCYTES NFR BLD AUTO: 20 % (ref 24–44)
MAGNESIUM SERPL-MCNC: 1.9 MG/DL (ref 1.8–2.4)
MCH RBC QN AUTO: 26.5 PG (ref 27–33)
MCHC RBC AUTO-ENTMCNC: 32 G/DL (ref 32–36.5)
MCV RBC AUTO: 83 FL (ref 80–96)
MONOCYTES # BLD AUTO: 0.6 10^3/UL (ref 0–0.8)
MONOCYTES NFR BLD AUTO: 12.7 % (ref 0–5)
NEUTROPHILS # BLD AUTO: 2.4 10^3/UL (ref 1.8–7.7)
NEUTROPHILS NFR BLD AUTO: 51.6 % (ref 36–66)
NRBC BLD AUTO-RTO: 0 % (ref 0–0)
PLATELET # BLD AUTO: 175 10^3/UL (ref 150–450)
POTASSIUM SERPL-SCNC: 4.7 MEQ/L (ref 3.5–5.1)
SODIUM SERPL-SCNC: 136 MEQ/L (ref 136–145)
WBC # BLD AUTO: 4.7 10^3/UL (ref 4–10)

## 2017-10-16 RX ADMIN — ATORVASTATIN CALCIUM SCH MG: 20 TABLET, FILM COATED ORAL at 09:02

## 2017-10-16 RX ADMIN — HYDRALAZINE HYDROCHLORIDE SCH MG: 25 TABLET, FILM COATED ORAL at 05:11

## 2017-10-16 RX ADMIN — LISINOPRIL SCH MG: 5 TABLET ORAL at 09:02

## 2017-10-16 RX ADMIN — DOCUSATE SODIUM,SENNOSIDES SCH TAB: 50; 8.6 TABLET, FILM COATED ORAL at 09:02

## 2017-10-16 RX ADMIN — HYDRALAZINE HYDROCHLORIDE SCH MG: 25 TABLET, FILM COATED ORAL at 14:00

## 2017-10-16 RX ADMIN — SODIUM CHLORIDE SCH UNITS: 4.5 INJECTION, SOLUTION INTRAVENOUS at 09:00

## 2017-10-16 RX ADMIN — INSULIN LISPRO SCH UNITS: 100 INJECTION, SOLUTION INTRAVENOUS; SUBCUTANEOUS at 07:30

## 2017-10-16 RX ADMIN — INSULIN LISPRO SCH UNITS: 100 INJECTION, SOLUTION INTRAVENOUS; SUBCUTANEOUS at 11:49

## 2017-10-16 RX ADMIN — GABAPENTIN SCH MG: 300 CAPSULE ORAL at 16:25

## 2017-10-16 RX ADMIN — ASPIRIN SCH MG: 81 TABLET ORAL at 09:02

## 2017-10-16 RX ADMIN — GABAPENTIN SCH MG: 300 CAPSULE ORAL at 09:02

## 2017-10-16 RX ADMIN — INSULIN LISPRO SCH UNITS: 100 INJECTION, SOLUTION INTRAVENOUS; SUBCUTANEOUS at 17:37

## 2017-10-16 RX ADMIN — INSULIN DETEMIR SCH UNITS: 100 INJECTION, SOLUTION SUBCUTANEOUS at 09:03

## 2017-10-16 RX ADMIN — SODIUM CHLORIDE SCH MLS/HR: 9 INJECTION, SOLUTION INTRAVENOUS at 05:09

## 2017-10-16 NOTE — DS.PDOC
Discharge Summary


General


Date of Admission


Oct 12, 2017 at 03:59


Date of Discharge


10/16/2017





Discharge Summary


DISCHARGE SUMMARY





DATE OF ADMISSION: 10/12/2017


DATE OF DISCHARGE: 10/16/2017





PRIMARY CARE PHYSICIAN: 


The residency clinic





DISCHARGE DIAGNOS(E)S: 


Cellulitis and osteomyelitis





HPI & HOSPITAL COURSE: 


62-year-old male with chronic diabetic foot ulcer, chronic venostasis of the 

bilateral lower extremities, recurrent lower extremity cellulitis, chronic 

diastolic CHF with right heart failure, CKD stage III, hypertension, morbid 

obesity, diabetes mellitus type 2 with neuropathy, CAD status post PCI who is 

admitted with cellulitis of the right lower extremity and concern for potential 

osteomyelitis.





1. Cellulitis of the right lower extremity, possible osteomyelitis: The patient 

is afebrile with a normal white count, but his right lower extremity is quite 

impressive clinically. The patient has been following with Dr. Saravia and 

Dr. Banerjee as an outpatient, and both providers have graciously agreed to 

evaluate the patient in the hospital. MRI of the right lower extremity is 

highly suspicious for osteomyelitis in the remaining fifth metatarsal as well 

as the adjacent cuboid. It also cannot exclude septic arthritis of the second 

MTP joint with osteomyelitis on both sides. He is now s/p debridement and 

lavage in the OR with Dr. Saravia on 10/12, and per Dr. Banerjee's recommendations

, he was continued on vancomycin and Merrem while we awaited intraoperative 

wound cultures.  Cultures grew PSA, Klebsiella, and corynebacterium.  Dr. Banerjee 

has changed him to PO levaquin and recommended a month of therapy; she will 

order appropriate lab work as an outpatient.  





2. Chronic diastolic CHF with right heart failure: Patient currently appears 

euvolemic.  Per patient, he only takes 40mg daily at home (despite the fact 

that the med rec says 80mg), so we changed him back to 40mg.  As his Cr was 

slightly up, we held yesterday's dose of lasix; Cr is now improved so we will 

restart home lasix 40mg daily at discharge.





3. CKD stage III: The patient's baseline creatinine appears to be in the mid 

ones. Cr was slightly bumped, probably from receiving lasix 80mg daily rather 

than usual 40mg daily for a couple days. Cr is now improved after holding lasix 

for a day, so we will restart home lasix 40mg daily at discharge.





4. Hypertension: Continue home Lasix, and increased dose of ACE inhibitor. 

Hydralazine was added on admission as the patient's BP was quite elevated, and 

he has had good response to this.





5. Diabetes mellitus type 2 with neuropathy: Continue home gabapentin and home 

Levemir. Sliding scale insulin while in house.





6. CAD status post PCI: No current chest pain. Continue home aspirin, statin. 

The patient is not on a beta blocker at baseline, presumably secondary to his 

baseline heart rate in the 60s.





DVT prophylaxis: Heparin





PHYSICAL EXAMINATION ON DISCHARGE:


VITAL SIGNS: 


Vital Signs








  Date Time  Temp Pulse Resp B/P (MAP) Pulse Ox O2 Delivery O2 Flow Rate FiO2


 


10/16/17 14:00    127/69    


 


10/16/17 14:00 97.4 66 18  97 Room Air  











General: Awake, alert, no acute distress


HEENT: Normocephalic, atraumatic, extraocular movements intact


CV: Regular rate and rhythm


Lungs: Clear to auscultation bilaterally


Abd: Soft, nontender, nondistended


Extremities: Right lower extremity swelling and erythema are improving, pedal 

pulses intact bilaterally


Neuro: Alert and oriented 3, normal speech


Psych: Normal Mood and affect





DISPOSITION: 


Home with home health





DISCHARGE INSTRUCTIONS: 


Follow-up with PCP within one week.  


Follow-up with Dr. Banerjee in 1-2 weeks.


Follow-up Dr. Saravia in one week. Dressing changes as per Dr. Saravia. 


If symptoms return, or if you experience worsening of your symptoms, please 

call your doctor or return to the emergency department.








ITEMS THAT NEED OUTPATIENT FOLLOWUP:


None





Patient was seen and examined by me on the day of discharge, and I spent a 

total time of greater than 30 minutes on this discharge.





Vital Signs/I&Os





Vital Signs








  Date Time  Temp Pulse Resp B/P (MAP) Pulse Ox O2 Delivery O2 Flow Rate FiO2


 


10/16/17 14:00    127/69    


 


10/16/17 14:00 97.4 66 18  97 Room Air  














I&O- Last 24 Hours up to 6 AM 


 


 10/17/17





 06:00


 


Intake Total 480 ml


 


Output Total 300 ml


 


Balance 180 ml











Laboratory Data


Labs 24H


Laboratory Tests 2


10/15/17 17:28: Bedside Glucose (Misc Panel) 146H


10/15/17 19:59: Bedside Glucose (Misc Panel) 118H


10/16/17 06:20: 


Immature Granulocyte % (Auto) 0.4H, White Blood Count 4.7, Red Blood Count 4.07L

, Hemoglobin 10.8L, Hematocrit 33.8L, Mean Corpuscular Volume 83.0, Mean 

Corpuscular Hemoglobin 26.5L, Mean Corpuscular Hemoglobin Concent 32.0, Red 

Cell Distribution Width 15.1H, Platelet Count 175, Neutrophils (%) (Auto) 51.6, 

Lymphocytes (%) (Auto) 20.0L, Monocytes (%) (Auto) 12.7H, Eosinophils (%) (Auto

) 14.4H, Basophils (%) (Auto) 0.9, Neutrophils # (Auto) 2.4, Lymphocytes # (Auto

) 0.9L, Monocytes # (Auto) 0.6, Eosinophils # (Auto) 0.7H, Basophils # (Auto) 

0.0, Immature Granulocyte # (Auto) 0.0, Nucleated Red Blood Cells % (auto) 0.0, 

Anion Gap 7L, Glomerular Filtration Rate 44.9L, Blood Urea Nitrogen 34H, 

Creatinine 1.67H, Sodium Level 136, Potassium Level 4.7, Chloride Level 101, 

Carbon Dioxide Level 28, Calcium Level 8.3L, Magnesium Level 1.9, C-Reactive 

Protein, Quantitative 1.36H


10/16/17 11:23: Bedside Glucose (Misc Panel) 126H


CBC/BMP


Laboratory Tests


10/16/17 06:20








Red Blood Count 4.07 L, Mean Corpuscular Volume 83.0, Mean Corpuscular 

Hemoglobin 26.5 L, Mean Corpuscular Hemoglobin Concent 32.0, Red Cell 

Distribution Width 15.1 H, Neutrophils (%) (Auto) 51.6, Lymphocytes (%) (Auto) 

20.0 L, Monocytes (%) (Auto) 12.7 H, Eosinophils (%) (Auto) 14.4 H, Basophils (%

) (Auto) 0.9, Neutrophils # (Auto) 2.4, Lymphocytes # (Auto) 0.9 L, Monocytes # 

(Auto) 0.6, Eosinophils # (Auto) 0.7 H, Basophils # (Auto) 0.0, Calcium Level 

8.3 L


FSBS





Laboratory Tests








Test


  10/15/17


17:28 10/15/17


19:59 10/16/17


11:23 Range/Units


 


 


Bedside Glucose (Misc Panel) 146 118 126   MG/DL











Microbiology





Microbiology


10/12/17 Blood Culture - Preliminary, Resulted


           No Growth after 72 hours. All specime...


10/12/17 Blood Culture - Preliminary, Resulted


           No Growth after 72 hours. All specime...


10/12/17 Wound Culture - Final, Complete


           Alcaligenes Faecalis (Odorans)


           Klebsiella Pneumoniae


           Pseudomonas Aeruginosa


           Corynebacterium Species


10/12/17 Wound Culture - Final, Complete


           Pseudomonas Aeruginosa


           Klebsiella Pneumoniae


           Corynebacterium Species


10/12/17 Anaerobic Culture - Final, Complete





Discharge Medications


Scheduled


Aspirin (Aspirin 81) 81 Mg Tab, 81 MG PO DAILY, (Reported)


Atorvastatin Calcium (Atorvastatin Calcium) 40 Mg Tab, 40 MG PO DAILY, (Reported

)


Furosemide (Furosemide) 80 Mg Tab, 40 MG PO DAILY


Gabapentin (Gabapentin) 300 Mg Cap, 300 MG PO TID, (Reported)


Hydralazine HCl (Hydralazine HCl) 25 Mg Tab, 25 MG PO Q8H


Insulin Detemir (Levemir) 1 Units/0.01 Ml Susp, 8 UNITS SC BID, (Reported)


Levofloxacin Hemihydrate (Levaquin) 500 Mg Tab, 500 MG PO DAILY


Lisinopril (Lisinopril) 5 Mg Tab, 5 MG PO BID





Scheduled PRN


Nitroglycerin (Nitrostat) 0.4 Mg Subl, 0.4 MG SL Q5MP PRN for CHEST PAIN, (

Reported)


Tramadol HCl (Tramadol HCl) 50 Mg Tab, 50 MG PO Q8H PRN for PAIN, (Reported)





Allergies


Coded Allergies:  


     Metformin (Unverified  Adverse Reaction, Unknown, Disturbances in Vision, 3

/14/17)











NAVARRO SHIELDS Oct 16, 2017 17:06

## 2017-10-17 NOTE — IPN
DATE OF SERVICE:  10/16/2017

 

The patient is ready to get discharged home.  He is feeling well.  He has no

fever or chills.  No nausea, vomiting, or diarrhea.  Swelling in right leg has

markedly decreased.  Wound is clean.  It was just dressed to a half hour prior to

discharge; and, therefore, I did not get to see the ulcer.

 

LABORATORIES:

White count is 4.7, hemoglobin 10.8, hematocrit 33.8, platelets 175.

 

Sodium 137, potassium 4.7, chloride 101, bicarbonate 28, BUN 34, creatinine 1.67,

glucose 87, calcium 8.3, magnesium 1.9, CRP 1.36 down from 2.2.

 

MICROBIOLOGY:  Right foot culture was positive for Pseudomonas, Klebsiella,

Alcaligenes, and Corynebacterium from two separate cultures with heavy growth of

Alcaligenes and moderate growth of Pseudomonas.  All gram-negative pathogens are

susceptible to levofloxacin.

 

Foot x-ray showed advanced erosive changes at the 2nd MTP joint with subluxation

compatible with osteomyelitis.  There is a very small segment of residual 5th

metatarsal visible after resection of the rest of the head and possible cuboid

osteomyelitis.

 

IMPRESSION:  Chronic osteomyelitis of the right foot with culture positive for

multiple gram-negative.

 

PLAN:  The patient could be discharged home on by mouth levofloxacin 500 mg by

mouth daily.  The patient was given a prescription for 30 days.  He will have

blood work done weekly:  Complete blood count (CBC), basic C-reactive protein

(CRP), sedimentation rate.  The patient will follow up in Dr. Banerjee's office in 2

weeks.  Dressing changes will be done daily by [facility].

## 2017-10-23 ENCOUNTER — HOSPITAL ENCOUNTER (OUTPATIENT)
Dept: HOSPITAL 53 - M LAB REF | Age: 62
End: 2017-10-23
Attending: INTERNAL MEDICINE
Payer: MEDICARE

## 2017-10-23 DIAGNOSIS — M86.9: Primary | ICD-10-CM

## 2017-10-23 LAB
ANION GAP SERPL CALC-SCNC: 4 MEQ/L (ref 8–16)
BUN SERPL-MCNC: 42 MG/DL (ref 7–18)
CALCIUM SERPL-MCNC: 8.6 MG/DL (ref 8.8–10.2)
CHLORIDE SERPL-SCNC: 110 MEQ/L (ref 98–107)
CO2 SERPL-SCNC: 25 MEQ/L (ref 21–32)
CREAT SERPL-MCNC: 1.72 MG/DL (ref 0.7–1.3)
ERYTHROCYTE [DISTWIDTH] IN BLOOD BY AUTOMATED COUNT: 15.5 % (ref 11.5–14.5)
ERYTHROCYTE [SEDIMENTATION RATE] IN BLOOD BY WESTERGREN METHOD: 65 MM/HR (ref 0–20)
GFR SERPL CREATININE-BSD FRML MDRD: 43.4 ML/MIN/{1.73_M2} (ref 49–?)
GLUCOSE SERPL-MCNC: 88 MG/DL (ref 80–110)
MCH RBC QN AUTO: 26.6 PG (ref 27–33)
MCHC RBC AUTO-ENTMCNC: 31.4 G/DL (ref 32–36.5)
MCV RBC AUTO: 84.8 FL (ref 80–96)
NRBC BLD AUTO-RTO: 0 % (ref 0–0)
PLATELET # BLD AUTO: 196 10^3/UL (ref 150–450)
POTASSIUM SERPL-SCNC: 5.5 MEQ/L (ref 3.5–5.1)
SODIUM SERPL-SCNC: 139 MEQ/L (ref 136–145)
WBC # BLD AUTO: 6.7 10^3/UL (ref 4–10)

## 2017-10-25 ENCOUNTER — HOSPITAL ENCOUNTER (OUTPATIENT)
Dept: HOSPITAL 53 - M LAB REF | Age: 62
End: 2017-10-25
Attending: STUDENT IN AN ORGANIZED HEALTH CARE EDUCATION/TRAINING PROGRAM
Payer: MEDICARE

## 2017-10-25 DIAGNOSIS — E87.5: Primary | ICD-10-CM

## 2017-10-25 LAB
ANION GAP SERPL CALC-SCNC: 6 MEQ/L (ref 8–16)
BUN SERPL-MCNC: 37 MG/DL (ref 7–18)
CALCIUM SERPL-MCNC: 8.8 MG/DL (ref 8.8–10.2)
CHLORIDE SERPL-SCNC: 105 MEQ/L (ref 98–107)
CO2 SERPL-SCNC: 26 MEQ/L (ref 21–32)
CREAT SERPL-MCNC: 1.71 MG/DL (ref 0.7–1.3)
GFR SERPL CREATININE-BSD FRML MDRD: 43.7 ML/MIN/{1.73_M2} (ref 49–?)
GLUCOSE SERPL-MCNC: 79 MG/DL (ref 80–110)
POTASSIUM SERPL-SCNC: 5.6 MEQ/L (ref 3.5–5.1)
SODIUM SERPL-SCNC: 137 MEQ/L (ref 136–145)

## 2017-10-30 ENCOUNTER — HOSPITAL ENCOUNTER (OUTPATIENT)
Dept: HOSPITAL 53 - M LAB REF | Age: 62
End: 2017-10-30
Attending: INTERNAL MEDICINE
Payer: MEDICARE

## 2017-10-30 DIAGNOSIS — M86.9: Primary | ICD-10-CM

## 2017-10-30 LAB
ANION GAP SERPL CALC-SCNC: 5 MEQ/L (ref 8–16)
BUN SERPL-MCNC: 40 MG/DL (ref 7–18)
CALCIUM SERPL-MCNC: 8.8 MG/DL (ref 8.8–10.2)
CHLORIDE SERPL-SCNC: 104 MEQ/L (ref 98–107)
CO2 SERPL-SCNC: 26 MEQ/L (ref 21–32)
CREAT SERPL-MCNC: 2.1 MG/DL (ref 0.7–1.3)
ERYTHROCYTE [DISTWIDTH] IN BLOOD BY AUTOMATED COUNT: 15.5 % (ref 11.5–14.5)
ERYTHROCYTE [SEDIMENTATION RATE] IN BLOOD BY WESTERGREN METHOD: 69 MM/HR (ref 0–20)
GFR SERPL CREATININE-BSD FRML MDRD: 34.5 ML/MIN/{1.73_M2} (ref 49–?)
GLUCOSE SERPL-MCNC: 84 MG/DL (ref 80–110)
MCH RBC QN AUTO: 26.6 PG (ref 27–33)
MCHC RBC AUTO-ENTMCNC: 32.3 G/DL (ref 32–36.5)
MCV RBC AUTO: 82.4 FL (ref 80–96)
NRBC BLD AUTO-RTO: 0 % (ref 0–0)
PLATELET # BLD AUTO: 215 10^3/UL (ref 150–450)
POTASSIUM SERPL-SCNC: 5.4 MEQ/L (ref 3.5–5.1)
SODIUM SERPL-SCNC: 135 MEQ/L (ref 136–145)
WBC # BLD AUTO: 6.2 10^3/UL (ref 4–10)

## 2017-11-10 ENCOUNTER — HOSPITAL ENCOUNTER (OUTPATIENT)
Dept: HOSPITAL 53 - M SFHCPLAZ | Age: 62
End: 2017-11-10
Attending: FAMILY MEDICINE
Payer: MEDICARE

## 2017-11-10 DIAGNOSIS — E87.5: Primary | ICD-10-CM

## 2017-11-10 LAB
ANION GAP SERPL CALC-SCNC: 8 MEQ/L (ref 8–16)
BUN SERPL-MCNC: 32 MG/DL (ref 7–18)
CALCIUM SERPL-MCNC: 8.6 MG/DL (ref 8.8–10.2)
CHLORIDE SERPL-SCNC: 104 MEQ/L (ref 98–107)
CO2 SERPL-SCNC: 24 MEQ/L (ref 21–32)
CREAT SERPL-MCNC: 1.81 MG/DL (ref 0.7–1.3)
GFR SERPL CREATININE-BSD FRML MDRD: 40.9 ML/MIN/{1.73_M2} (ref 49–?)
GLUCOSE SERPL-MCNC: 201 MG/DL (ref 80–110)
POTASSIUM SERPL-SCNC: 5.4 MEQ/L (ref 3.5–5.1)
SODIUM SERPL-SCNC: 136 MEQ/L (ref 136–145)

## 2017-11-13 ENCOUNTER — HOSPITAL ENCOUNTER (OUTPATIENT)
Dept: HOSPITAL 53 - M LAB REF | Age: 62
End: 2017-11-13
Attending: INTERNAL MEDICINE
Payer: MEDICARE

## 2017-11-13 DIAGNOSIS — I89.0: Primary | ICD-10-CM

## 2017-11-13 LAB
ANION GAP SERPL CALC-SCNC: 8 MEQ/L (ref 8–16)
BUN SERPL-MCNC: 24 MG/DL (ref 7–18)
CALCIUM SERPL-MCNC: 8.4 MG/DL (ref 8.8–10.2)
CHLORIDE SERPL-SCNC: 106 MEQ/L (ref 98–107)
CO2 SERPL-SCNC: 24 MEQ/L (ref 21–32)
CREAT SERPL-MCNC: 1.69 MG/DL (ref 0.7–1.3)
ERYTHROCYTE [DISTWIDTH] IN BLOOD BY AUTOMATED COUNT: 16.2 % (ref 11.5–14.5)
ERYTHROCYTE [SEDIMENTATION RATE] IN BLOOD BY WESTERGREN METHOD: 60 MM/HR (ref 0–20)
GFR SERPL CREATININE-BSD FRML MDRD: 44.3 ML/MIN/{1.73_M2} (ref 49–?)
GLUCOSE SERPL-MCNC: 123 MG/DL (ref 80–110)
MCH RBC QN AUTO: 27.4 PG (ref 27–33)
MCHC RBC AUTO-ENTMCNC: 31.9 G/DL (ref 32–36.5)
MCV RBC AUTO: 85.9 FL (ref 80–96)
NRBC BLD AUTO-RTO: 0 % (ref 0–0)
PLATELET # BLD AUTO: 154 10^3/UL (ref 150–450)
POTASSIUM SERPL-SCNC: 4.4 MEQ/L (ref 3.5–5.1)
SODIUM SERPL-SCNC: 138 MEQ/L (ref 136–145)
WBC # BLD AUTO: 5.8 10^3/UL (ref 4–10)

## 2017-11-27 ENCOUNTER — HOSPITAL ENCOUNTER (OUTPATIENT)
Dept: HOSPITAL 53 - M LAB REF | Age: 62
End: 2017-11-27
Attending: INTERNAL MEDICINE
Payer: MEDICARE

## 2017-11-27 DIAGNOSIS — Z79.4: ICD-10-CM

## 2017-11-27 DIAGNOSIS — M86.671: Primary | ICD-10-CM

## 2017-11-27 DIAGNOSIS — E11.65: ICD-10-CM

## 2017-11-27 DIAGNOSIS — I96: ICD-10-CM

## 2017-11-27 DIAGNOSIS — I10: ICD-10-CM

## 2017-11-27 DIAGNOSIS — A49.9: ICD-10-CM

## 2017-11-27 LAB
ANION GAP SERPL CALC-SCNC: 6 MEQ/L (ref 8–16)
BASOPHILS # BLD AUTO: 0.1 10^3/UL (ref 0–0.2)
BASOPHILS NFR BLD AUTO: 0.8 % (ref 0–1)
BUN SERPL-MCNC: 49 MG/DL (ref 7–18)
CALCIUM SERPL-MCNC: 8.5 MG/DL (ref 8.8–10.2)
CHLORIDE SERPL-SCNC: 106 MEQ/L (ref 98–107)
CO2 SERPL-SCNC: 26 MEQ/L (ref 21–32)
CREAT SERPL-MCNC: 1.99 MG/DL (ref 0.7–1.3)
EOSINOPHIL # BLD AUTO: 0.7 10^3/UL (ref 0–0.5)
EOSINOPHIL NFR BLD AUTO: 11.1 % (ref 0–3)
ERYTHROCYTE [DISTWIDTH] IN BLOOD BY AUTOMATED COUNT: 15.7 % (ref 11.5–14.5)
ERYTHROCYTE [SEDIMENTATION RATE] IN BLOOD BY WESTERGREN METHOD: 54 MM/HR (ref 0–20)
GFR SERPL CREATININE-BSD FRML MDRD: 36.7 ML/MIN/{1.73_M2} (ref 49–?)
GLUCOSE SERPL-MCNC: 150 MG/DL (ref 80–110)
IMM GRANULOCYTES NFR BLD: 0.3 % (ref 0–0)
LYMPHOCYTES # BLD AUTO: 1.7 10^3/UL (ref 1.5–4.5)
LYMPHOCYTES NFR BLD AUTO: 26.2 % (ref 24–44)
MCH RBC QN AUTO: 27.1 PG (ref 27–33)
MCHC RBC AUTO-ENTMCNC: 30.9 G/DL (ref 32–36.5)
MCV RBC AUTO: 87.5 FL (ref 80–96)
MONOCYTES # BLD AUTO: 0.7 10^3/UL (ref 0–0.8)
MONOCYTES NFR BLD AUTO: 10.7 % (ref 0–5)
NEUTROPHILS # BLD AUTO: 3.2 10^3/UL (ref 1.8–7.7)
NEUTROPHILS NFR BLD AUTO: 50.9 % (ref 36–66)
NRBC BLD AUTO-RTO: 0 % (ref 0–0)
PLATELET # BLD AUTO: 199 10^3/UL (ref 150–450)
POTASSIUM SERPL-SCNC: 4.9 MEQ/L (ref 3.5–5.1)
SODIUM SERPL-SCNC: 138 MEQ/L (ref 136–145)
WBC # BLD AUTO: 6.4 10^3/UL (ref 4–10)

## 2017-12-12 ENCOUNTER — HOSPITAL ENCOUNTER (EMERGENCY)
Dept: HOSPITAL 53 - M ED | Age: 62
Discharge: HOME | End: 2017-12-12
Payer: MEDICARE

## 2017-12-12 VITALS — DIASTOLIC BLOOD PRESSURE: 100 MMHG | SYSTOLIC BLOOD PRESSURE: 140 MMHG

## 2017-12-12 VITALS — WEIGHT: 315 LBS | HEIGHT: 72 IN | BODY MASS INDEX: 42.66 KG/M2

## 2017-12-12 DIAGNOSIS — E78.9: ICD-10-CM

## 2017-12-12 DIAGNOSIS — Z79.4: ICD-10-CM

## 2017-12-12 DIAGNOSIS — R53.81: Primary | ICD-10-CM

## 2017-12-12 DIAGNOSIS — G47.30: ICD-10-CM

## 2017-12-12 DIAGNOSIS — Z88.8: ICD-10-CM

## 2017-12-12 DIAGNOSIS — E11.40: ICD-10-CM

## 2017-12-12 DIAGNOSIS — I50.9: ICD-10-CM

## 2017-12-12 DIAGNOSIS — L89.899: ICD-10-CM

## 2017-12-12 DIAGNOSIS — Z79.899: ICD-10-CM

## 2017-12-12 DIAGNOSIS — N18.9: ICD-10-CM

## 2017-12-12 DIAGNOSIS — I11.0: ICD-10-CM

## 2017-12-12 DIAGNOSIS — Z79.82: ICD-10-CM

## 2017-12-12 DIAGNOSIS — E11.621: ICD-10-CM

## 2017-12-12 DIAGNOSIS — Z87.891: ICD-10-CM

## 2017-12-12 DIAGNOSIS — I25.2: ICD-10-CM

## 2017-12-12 LAB
ALBUMIN SERPL BCG-MCNC: 3.1 GM/DL (ref 3.2–5.2)
ALBUMIN/GLOB SERPL: 0.72 {RATIO} (ref 1–1.93)
ALP SERPL-CCNC: 92 U/L (ref 45–117)
ALT SERPL W P-5'-P-CCNC: 15 U/L (ref 12–78)
ANION GAP SERPL CALC-SCNC: 6 MEQ/L (ref 8–16)
AST SERPL-CCNC: 11 U/L (ref 7–37)
BASOPHILS # BLD AUTO: 0.1 10^3/UL (ref 0–0.2)
BASOPHILS NFR BLD AUTO: 0.6 % (ref 0–1)
BILIRUB CONJ SERPL-MCNC: 0.2 MG/DL (ref 0–0.2)
BILIRUB SERPL-MCNC: 0.5 MG/DL (ref 0.2–1)
BUN SERPL-MCNC: 35 MG/DL (ref 7–18)
CALCIUM SERPL-MCNC: 8.5 MG/DL (ref 8.8–10.2)
CHLORIDE SERPL-SCNC: 108 MEQ/L (ref 98–107)
CO2 SERPL-SCNC: 26 MEQ/L (ref 21–32)
CREAT SERPL-MCNC: 1.69 MG/DL (ref 0.7–1.3)
EOSINOPHIL # BLD AUTO: 0.7 10^3/UL (ref 0–0.5)
EOSINOPHIL NFR BLD AUTO: 9 % (ref 0–3)
ERYTHROCYTE [DISTWIDTH] IN BLOOD BY AUTOMATED COUNT: 15.3 % (ref 11.5–14.5)
GFR SERPL CREATININE-BSD FRML MDRD: 44.3 ML/MIN/{1.73_M2} (ref 49–?)
GLUCOSE SERPL-MCNC: 114 MG/DL (ref 80–110)
IMM GRANULOCYTES NFR BLD: 0.4 % (ref 0–0)
LYMPHOCYTES # BLD AUTO: 1.7 10^3/UL (ref 1.5–4.5)
LYMPHOCYTES NFR BLD AUTO: 22.2 % (ref 24–44)
MCH RBC QN AUTO: 27.5 PG (ref 27–33)
MCHC RBC AUTO-ENTMCNC: 32.1 G/DL (ref 32–36.5)
MCV RBC AUTO: 85.7 FL (ref 80–96)
MONOCYTES # BLD AUTO: 0.7 10^3/UL (ref 0–0.8)
MONOCYTES NFR BLD AUTO: 8.3 % (ref 0–5)
NEUTROPHILS # BLD AUTO: 4.6 10^3/UL (ref 1.8–7.7)
NEUTROPHILS NFR BLD AUTO: 59.5 % (ref 36–66)
NRBC BLD AUTO-RTO: 0 % (ref 0–0)
PLATELET # BLD AUTO: 172 10^3/UL (ref 150–450)
POTASSIUM SERPL-SCNC: 5.1 MEQ/L (ref 3.5–5.1)
PROT SERPL-MCNC: 7.4 GM/DL (ref 6.4–8.2)
SODIUM SERPL-SCNC: 140 MEQ/L (ref 136–145)
WBC # BLD AUTO: 7.8 10^3/UL (ref 4–10)

## 2017-12-12 NOTE — ECGEPIP
Stationary ECG Study

                           [facility] - ED

                                       

                                       Test Date:    2017

Pat Name:     ALESIA SANTORO           Department:   

Patient ID:   [id number]                 Room:         -

Gender:       M                        Technician:   ROLF

:          1955               Requested By: Rabia Trinidad 

Order Number: [id number]     Reading MD:   Rabia Trinidad

                                 Measurements

Intervals                              Axis          

Rate:         73                       P:            38

SD:           168                      QRS:          76

QRSD:         116                      T:            47

QT:           397                                    

QTc:          439                                    

                           Interpretive Statements

SINUS RHYTHM

LOW QRS VOLTAGE IN PRECORDIAL LEADS

INFERIOR/ANTERIOR MYOCARDIAL INFARCTION, PROBABLY OLD

 

Electronically Signed On 2017 21:19:31 EST by Rabia Trinidad

## 2017-12-18 ENCOUNTER — HOSPITAL ENCOUNTER (OUTPATIENT)
Dept: HOSPITAL 53 - M LAB REF | Age: 62
End: 2017-12-18
Attending: STUDENT IN AN ORGANIZED HEALTH CARE EDUCATION/TRAINING PROGRAM
Payer: MEDICARE

## 2017-12-18 DIAGNOSIS — L02.213: Primary | ICD-10-CM

## 2018-04-06 ENCOUNTER — HOSPITAL ENCOUNTER (OUTPATIENT)
Dept: HOSPITAL 53 - M LAB REF | Age: 63
End: 2018-04-06
Attending: FAMILY MEDICINE
Payer: MEDICARE

## 2018-04-06 DIAGNOSIS — E11.65: Primary | ICD-10-CM

## 2018-04-06 LAB
ANION GAP: 8 MEQ/L (ref 8–16)
BLOOD UREA NITROGEN: 47 MG/DL (ref 7–18)
CALCIUM LEVEL: 8.6 MG/DL (ref 8.8–10.2)
CARBON DIOXIDE LEVEL: 22 MEQ/L (ref 21–32)
CHLORIDE LEVEL: 110 MEQ/L (ref 98–107)
CREATININE FOR GFR: 2.32 MG/DL (ref 0.7–1.3)
EST. AVERAGE GLUCOSE BLD GHB EST-MCNC: 151 MG/DL (ref 60–110)
GFR SERPL CREATININE-BSD FRML MDRD: 30.6 ML/MIN/{1.73_M2} (ref 49–?)
GLUCOSE, FASTING: 240 MG/DL (ref 70–100)
POTASSIUM SERUM: 5.4 MEQ/L (ref 3.5–5.1)
SODIUM LEVEL: 140 MEQ/L (ref 136–145)

## 2018-10-15 ENCOUNTER — HOSPITAL ENCOUNTER (INPATIENT)
Dept: HOSPITAL 53 - M MSPAV | Age: 63
LOS: 7 days | Discharge: HOME HEALTH | DRG: 291 | End: 2018-10-22
Attending: INTERNAL MEDICINE | Admitting: HOSPITALIST
Payer: MEDICARE

## 2018-10-15 DIAGNOSIS — R33.9: ICD-10-CM

## 2018-10-15 DIAGNOSIS — Z90.79: ICD-10-CM

## 2018-10-15 DIAGNOSIS — Z79.82: ICD-10-CM

## 2018-10-15 DIAGNOSIS — I50.33: ICD-10-CM

## 2018-10-15 DIAGNOSIS — E11.40: ICD-10-CM

## 2018-10-15 DIAGNOSIS — N40.0: ICD-10-CM

## 2018-10-15 DIAGNOSIS — E83.42: ICD-10-CM

## 2018-10-15 DIAGNOSIS — E11.21: ICD-10-CM

## 2018-10-15 DIAGNOSIS — E11.621: ICD-10-CM

## 2018-10-15 DIAGNOSIS — I16.0: ICD-10-CM

## 2018-10-15 DIAGNOSIS — I13.0: Primary | ICD-10-CM

## 2018-10-15 DIAGNOSIS — I27.20: ICD-10-CM

## 2018-10-15 DIAGNOSIS — I25.10: ICD-10-CM

## 2018-10-15 DIAGNOSIS — J18.9: ICD-10-CM

## 2018-10-15 DIAGNOSIS — Z88.8: ICD-10-CM

## 2018-10-15 DIAGNOSIS — Z87.891: ICD-10-CM

## 2018-10-15 DIAGNOSIS — Z79.899: ICD-10-CM

## 2018-10-15 DIAGNOSIS — N17.9: ICD-10-CM

## 2018-10-15 DIAGNOSIS — N18.3: ICD-10-CM

## 2018-10-15 DIAGNOSIS — J44.9: ICD-10-CM

## 2018-10-15 DIAGNOSIS — I25.2: ICD-10-CM

## 2018-10-15 DIAGNOSIS — E66.2: ICD-10-CM

## 2018-10-15 DIAGNOSIS — E87.5: ICD-10-CM

## 2018-10-15 DIAGNOSIS — Z95.2: ICD-10-CM

## 2018-10-15 DIAGNOSIS — I27.81: ICD-10-CM

## 2018-10-15 DIAGNOSIS — K80.20: ICD-10-CM

## 2018-10-15 DIAGNOSIS — I87.8: ICD-10-CM

## 2018-10-15 LAB
ALBUMIN/GLOBULIN RATIO: 0.58 (ref 1–1.93)
ALBUMIN: 2.5 GM/DL (ref 3.2–5.2)
ALKALINE PHOSPHATASE: 108 U/L (ref 45–117)
ALT SERPL W P-5'-P-CCNC: 14 U/L (ref 12–78)
AMORPH SED URNS QL MICRO: (no result)
ANION GAP: 6 MEQ/L (ref 8–16)
ANION GAP: 8 MEQ/L (ref 8–16)
APPEARANCE, URINE: CLEAR
AST SERPL-CCNC: 16 U/L (ref 7–37)
BACTERIA UR QL AUTO: NEGATIVE
BASO #: 0 10^3/UL (ref 0–0.2)
BASO %: 0.2 % (ref 0–1)
BILIRUB CONJ SERPL-MCNC: 0.2 MG/DL (ref 0–0.2)
BILIRUBIN, URINE AUTO: NEGATIVE
BILIRUBIN,TOTAL: 0.5 MG/DL (ref 0.2–1)
BLOOD UREA NITROGEN: 30 MG/DL (ref 7–18)
BLOOD UREA NITROGEN: 35 MG/DL (ref 7–18)
BLOOD, URINE BLOOD: (no result)
CALCIUM LEVEL: 7.6 MG/DL (ref 8.8–10.2)
CALCIUM LEVEL: 7.8 MG/DL (ref 8.8–10.2)
CARBON DIOXIDE LEVEL: 25 MEQ/L (ref 21–32)
CARBON DIOXIDE LEVEL: 25 MEQ/L (ref 21–32)
CHLORIDE LEVEL: 103 MEQ/L (ref 98–107)
CHLORIDE LEVEL: 106 MEQ/L (ref 98–107)
CHLORIDE,RANDOM URINE: 99 MEQ/L
CK MB CFR.DF SERPL CALC: 0.96
CK MB CFR.DF SERPL CALC: 1.83
CK SERPL-CCNC: 146 U/L (ref 39–308)
CK SERPL-CCNC: 164 U/L (ref 39–308)
CK-MB VALUE MASS: 1 NG/ML (ref ?–3.6)
CK-MB VALUE MASS: 3 NG/ML (ref ?–3.6)
CREATININE FOR GFR: 2.09 MG/DL (ref 0.7–1.3)
CREATININE FOR GFR: 2.61 MG/DL (ref 0.7–1.3)
CREATININE,RANDOM URINE: 163 MG/DL
CRP SERPL-MCNC: 3.5 MG/DL (ref 0–0.3)
EOS #: 0.2 10^3/UL (ref 0–0.5)
EOSINOPHIL NFR BLD AUTO: 1.8 % (ref 0–3)
FLUAV RNA UPPER RESP QL NAA+PROBE: NEGATIVE
GFR SERPL CREATININE-BSD FRML MDRD: 26.7 ML/MIN/{1.73_M2} (ref 49–?)
GFR SERPL CREATININE-BSD FRML MDRD: 34.5 ML/MIN/{1.73_M2} (ref 49–?)
GLUCOSE BLDC GLUCOMTR-MCNC: 336 MG/DL (ref 80–115)
GLUCOSE BLDC GLUCOMTR-MCNC: 390 MG/DL (ref 80–115)
GLUCOSE, FASTING: 121 MG/DL (ref 70–100)
GLUCOSE, FASTING: 360 MG/DL (ref 70–100)
GLUCOSE, URINE (UA) AUTO: (no result) MG/DL
HEMATOCRIT: 34.1 % (ref 42–52)
HEMOGLOBIN: 10.5 G/DL (ref 13.5–17.5)
IMMATURE GRANULOCYTE %: 0.5 % (ref 0–3)
INFLUENZA B AMPLIFICATION: NEGATIVE
INR: 1.25
KETONE, URINE AUTO: NEGATIVE MG/DL
LACTIC ACID SEPSIS PROTOCOL: 1.3 MMOL/L (ref 0.4–2)
LEUKOCYTE ESTERASE UR QL STRIP.AUTO: NEGATIVE
LYMPH #: 0.8 10^3/UL (ref 1.5–4.5)
LYMPH %: 7.9 % (ref 24–44)
MEAN CORPUSCULAR HEMOGLOBIN: 26.7 PG (ref 27–33)
MEAN CORPUSCULAR HGB CONC: 30.8 G/DL (ref 32–36.5)
MEAN CORPUSCULAR VOLUME: 86.8 FL (ref 80–96)
MONO #: 0.6 10^3/UL (ref 0–0.8)
MONO %: 6 % (ref 0–5)
MUCUS, URINE: (no result)
NEUTROPHILS #: 8.8 10^3/UL (ref 1.8–7.7)
NEUTROPHILS %: 83.6 % (ref 36–66)
NITRITE, URINE AUTO: NEGATIVE
NRBC BLD AUTO-RTO: 0 % (ref 0–0)
NT-PRO BNP: 1986 PG/ML (ref ?–125)
OSMOLALITY URINE: 458 MOSM/KG (ref 500–800)
PH,URINE: 5 UNITS (ref 5–9)
PLATELET COUNT, AUTOMATED: 172 10^3/UL (ref 150–450)
POTASSIUM RANDOM URINE: 52.7 MEQ/L
POTASSIUM SERUM: 5.4 MEQ/L (ref 3.5–5.1)
POTASSIUM SERUM: 5.9 MEQ/L (ref 3.5–5.1)
PROT UR QL STRIP.AUTO: (no result) MG/DL
PROTHROMBIN TIME: 15.9 SECONDS (ref 12.1–14.4)
RBC, URINE AUTO: 14 /HPF (ref 0–3)
RED BLOOD COUNT: 3.93 10^6/UL (ref 4.3–6.1)
RED CELL DISTRIBUTION WIDTH: 14.6 % (ref 11.5–14.5)
SODIUM LEVEL: 136 MEQ/L (ref 136–145)
SODIUM LEVEL: 137 MEQ/L (ref 136–145)
SODIUM,RANDOM URINE: 68 MEQ/L
SPECIFIC GRAVITY URINE AUTO: 1.02 (ref 1–1.03)
SQUAMOUS #/AREA URNS AUTO: 0 /HPF (ref 0–6)
THYROID STIMULATING HORMONE: 2.67 UIU/ML (ref 0.36–3.74)
THYROXINE (T4): 6.5 UG/DL (ref 4.5–12)
TOTAL PROTEIN,RANDOM URINE: 651.6 MG/DL (ref 0–12)
TOTAL PROTEIN: 6.8 GM/DL (ref 6.4–8.2)
TROPONIN I: 0.02 NG/ML (ref ?–0.1)
TROPONIN I: < 0.02 NG/ML (ref ?–0.1)
UROBILINOGEN, URINE AUTO: 2 MG/DL (ref 0–2)
VENOUS BASE EXCESS: -1.8 (ref -2–2)
VENOUS HCO3: 24 MEQ/L (ref 23–27)
VENOUS O2 SATURATION: 94 % (ref 60–80)
VENOUS PARTIAL PRESSURE CO2: 45 MMHG (ref 38–50)
VENOUS PARTIAL PRESSURE O2: 70 MMHG (ref 30–50)
VENOUS PH: 7.34 UNITS (ref 7.33–7.43)
VENOUS STANDARD HCO3: 22.9 MEQ/L
VENOUS TOTAL CO2: 25.4 MEQ/L (ref 24–28)
WBC, URINE AUTO: 2 /HPF (ref 0–3)
WHITE BLOOD COUNT: 10.5 10^3/UL (ref 4–10)

## 2018-10-15 RX ADMIN — DEXTROSE MONOHYDRATE 1 MG: 50 INJECTION, SOLUTION INTRAVENOUS at 17:30

## 2018-10-15 RX ADMIN — FUROSEMIDE 1 MG: 10 INJECTION, SOLUTION INTRAMUSCULAR; INTRAVENOUS at 10:11

## 2018-10-15 RX ADMIN — DOCUSATE SODIUM,SENNOSIDES 1 TAB: 50; 8.6 TABLET, FILM COATED ORAL at 22:06

## 2018-10-15 RX ADMIN — HYDRALAZINE HYDROCHLORIDE 1 MG: 10 TABLET, FILM COATED ORAL at 21:00

## 2018-10-15 RX ADMIN — DOCUSATE SODIUM,SENNOSIDES 1 TAB: 50; 8.6 TABLET, FILM COATED ORAL at 09:00

## 2018-10-15 RX ADMIN — INSULIN DETEMIR 1 UNITS: 100 INJECTION, SOLUTION SUBCUTANEOUS at 09:00

## 2018-10-15 RX ADMIN — GABAPENTIN 1 MG: 300 CAPSULE ORAL at 22:06

## 2018-10-15 RX ADMIN — INSULIN LISPRO 10 UNITS: 100 INJECTION, SOLUTION INTRAVENOUS; SUBCUTANEOUS at 17:23

## 2018-10-15 RX ADMIN — SODIUM CHLORIDE 1 UNITS: 4.5 INJECTION, SOLUTION INTRAVENOUS at 16:19

## 2018-10-15 RX ADMIN — METHYLPREDNISOLONE SODIUM SUCCINATE 1 MG: 125 INJECTION, POWDER, FOR SOLUTION INTRAMUSCULAR; INTRAVENOUS at 08:07

## 2018-10-15 RX ADMIN — SODIUM CHLORIDE 1 UNITS: 4.5 INJECTION, SOLUTION INTRAVENOUS at 16:25

## 2018-10-15 RX ADMIN — INSULIN LISPRO 1 UNITS: 100 INJECTION, SOLUTION INTRAVENOUS; SUBCUTANEOUS at 12:00

## 2018-10-15 RX ADMIN — INSULIN DETEMIR 1 UNITS: 100 INJECTION, SOLUTION SUBCUTANEOUS at 22:05

## 2018-10-15 RX ADMIN — NITROGLYCERIN 1 GM: 20 OINTMENT TOPICAL at 16:18

## 2018-10-15 RX ADMIN — DEXTROSE MONOHYDRATE 1 MLS/HR: 50 INJECTION, SOLUTION INTRAVENOUS at 11:24

## 2018-10-15 RX ADMIN — ALBUTEROL SULFATE 1 MG: 2.5 SOLUTION RESPIRATORY (INHALATION) at 07:25

## 2018-10-15 RX ADMIN — DEXTROSE MONOHYDRATE 1 MLS/HR: 50 INJECTION, SOLUTION INTRAVENOUS at 20:43

## 2018-10-15 RX ADMIN — IPRATROPIUM BROMIDE AND ALBUTEROL SULFATE 1 ML: .5; 3 SOLUTION RESPIRATORY (INHALATION) at 13:55

## 2018-10-15 RX ADMIN — HYDRALAZINE HYDROCHLORIDE 1 MG: 25 TABLET, FILM COATED ORAL at 16:18

## 2018-10-15 RX ADMIN — CEFTRIAXONE 1 MLS/HR: 1 INJECTION, POWDER, FOR SOLUTION INTRAMUSCULAR; INTRAVENOUS at 10:19

## 2018-10-15 RX ADMIN — SODIUM CHLORIDE 1 UNITS: 4.5 INJECTION, SOLUTION INTRAVENOUS at 22:00

## 2018-10-15 RX ADMIN — IPRATROPIUM BROMIDE AND ALBUTEROL SULFATE 1 ML: .5; 3 SOLUTION RESPIRATORY (INHALATION) at 07:25

## 2018-10-15 RX ADMIN — ACETAMINOPHEN 1 MG: 325 TABLET ORAL at 08:43

## 2018-10-15 RX ADMIN — IPRATROPIUM BROMIDE AND ALBUTEROL SULFATE 1 ML: .5; 3 SOLUTION RESPIRATORY (INHALATION) at 20:21

## 2018-10-15 RX ADMIN — SODIUM POLYSTYRENE SULFONATE 1 GM: 15 SUSPENSION ORAL; RECTAL at 10:11

## 2018-10-15 RX ADMIN — GABAPENTIN 1 MG: 300 CAPSULE ORAL at 16:17

## 2018-10-15 RX ADMIN — INSULIN LISPRO 6 UNITS: 100 INJECTION, SOLUTION INTRAVENOUS; SUBCUTANEOUS at 22:06

## 2018-10-16 LAB
ALBUMIN/GLOBULIN RATIO: 0.5 (ref 1–1.93)
ALBUMIN: 2.5 GM/DL (ref 3.2–5.2)
ALKALINE PHOSPHATASE: 100 U/L (ref 45–117)
ALT SERPL W P-5'-P-CCNC: 12 U/L (ref 12–78)
ANION GAP: 5 MEQ/L (ref 8–16)
AST SERPL-CCNC: 8 U/L (ref 7–37)
BILIRUBIN,TOTAL: 0.3 MG/DL (ref 0.2–1)
BLOOD UREA NITROGEN: 40 MG/DL (ref 7–18)
CALCIUM LEVEL: 7.9 MG/DL (ref 8.8–10.2)
CARBON DIOXIDE LEVEL: 28 MEQ/L (ref 21–32)
CHLORIDE LEVEL: 104 MEQ/L (ref 98–107)
CREATININE FOR GFR: 2.44 MG/DL (ref 0.7–1.3)
CRP SERPL-MCNC: 6.66 MG/DL (ref 0–0.3)
GFR SERPL CREATININE-BSD FRML MDRD: 28.9 ML/MIN/{1.73_M2} (ref 49–?)
GLUCOSE BLDC GLUCOMTR-MCNC: 153 MG/DL (ref 80–115)
GLUCOSE BLDC GLUCOMTR-MCNC: 196 MG/DL (ref 80–115)
GLUCOSE, FASTING: 246 MG/DL (ref 70–100)
HEMATOCRIT: 34.2 % (ref 42–52)
HEMOGLOBIN: 10.3 G/DL (ref 13.5–17.5)
MAGNESIUM LEVEL: 1.6 MG/DL (ref 1.8–2.4)
MEAN CORPUSCULAR HEMOGLOBIN: 26.1 PG (ref 27–33)
MEAN CORPUSCULAR HGB CONC: 30.1 G/DL (ref 32–36.5)
MEAN CORPUSCULAR VOLUME: 86.6 FL (ref 80–96)
NRBC BLD AUTO-RTO: 0 % (ref 0–0)
PLATELET COUNT, AUTOMATED: 165 10^3/UL (ref 150–450)
POTASSIUM SERUM: 5.3 MEQ/L (ref 3.5–5.1)
RED BLOOD COUNT: 3.95 10^6/UL (ref 4.3–6.1)
RED CELL DISTRIBUTION WIDTH: 14.3 % (ref 11.5–14.5)
SODIUM LEVEL: 137 MEQ/L (ref 136–145)
TOTAL PROTEIN: 7.5 GM/DL (ref 6.4–8.2)
WHITE BLOOD COUNT: 10.8 10^3/UL (ref 4–10)

## 2018-10-16 RX ADMIN — HYDRALAZINE HYDROCHLORIDE 1 MG: 10 TABLET, FILM COATED ORAL at 16:53

## 2018-10-16 RX ADMIN — INSULIN DETEMIR 1 UNITS: 100 INJECTION, SOLUTION SUBCUTANEOUS at 07:52

## 2018-10-16 RX ADMIN — DOCUSATE SODIUM,SENNOSIDES 1 TAB: 50; 8.6 TABLET, FILM COATED ORAL at 20:32

## 2018-10-16 RX ADMIN — GABAPENTIN 1 MG: 300 CAPSULE ORAL at 20:29

## 2018-10-16 RX ADMIN — IPRATROPIUM BROMIDE AND ALBUTEROL SULFATE 1 ML: .5; 3 SOLUTION RESPIRATORY (INHALATION) at 20:45

## 2018-10-16 RX ADMIN — GABAPENTIN 1 MG: 300 CAPSULE ORAL at 07:51

## 2018-10-16 RX ADMIN — INSULIN LISPRO 1 UNITS: 100 INJECTION, SOLUTION INTRAVENOUS; SUBCUTANEOUS at 20:29

## 2018-10-16 RX ADMIN — GABAPENTIN 1 MG: 300 CAPSULE ORAL at 16:54

## 2018-10-16 RX ADMIN — SODIUM CHLORIDE 1 UNITS: 4.5 INJECTION, SOLUTION INTRAVENOUS at 13:55

## 2018-10-16 RX ADMIN — INSULIN LISPRO 1 UNITS: 100 INJECTION, SOLUTION INTRAVENOUS; SUBCUTANEOUS at 17:00

## 2018-10-16 RX ADMIN — DOCUSATE SODIUM,SENNOSIDES 1 TAB: 50; 8.6 TABLET, FILM COATED ORAL at 20:29

## 2018-10-16 RX ADMIN — TAMSULOSIN HYDROCHLORIDE 1 MG: 0.4 CAPSULE ORAL at 20:29

## 2018-10-16 RX ADMIN — INSULIN LISPRO 8 UNITS: 100 INJECTION, SOLUTION INTRAVENOUS; SUBCUTANEOUS at 07:52

## 2018-10-16 RX ADMIN — HYDRALAZINE HYDROCHLORIDE 1 MG: 10 TABLET, FILM COATED ORAL at 07:50

## 2018-10-16 RX ADMIN — SODIUM CHLORIDE 1 UNITS: 4.5 INJECTION, SOLUTION INTRAVENOUS at 22:00

## 2018-10-16 RX ADMIN — ASPIRIN 1 MG: 81 TABLET ORAL at 07:51

## 2018-10-16 RX ADMIN — IPRATROPIUM BROMIDE AND ALBUTEROL SULFATE 1 ML: .5; 3 SOLUTION RESPIRATORY (INHALATION) at 01:31

## 2018-10-16 RX ADMIN — LEVOFLOXACIN 1 MLS/HR: 5 INJECTION, SOLUTION INTRAVENOUS at 12:11

## 2018-10-16 RX ADMIN — DOCUSATE SODIUM,SENNOSIDES 1 TAB: 50; 8.6 TABLET, FILM COATED ORAL at 07:49

## 2018-10-16 RX ADMIN — MAGNESIUM SULFATE IN DEXTROSE 1 MLS/HR: 10 INJECTION, SOLUTION INTRAVENOUS at 10:27

## 2018-10-16 RX ADMIN — PATIROMER 1 GM: 8.4 POWDER, FOR SUSPENSION ORAL at 13:56

## 2018-10-16 RX ADMIN — INSULIN DETEMIR 1 UNITS: 100 INJECTION, SOLUTION SUBCUTANEOUS at 20:29

## 2018-10-16 RX ADMIN — IPRATROPIUM BROMIDE AND ALBUTEROL SULFATE 1 ML: .5; 3 SOLUTION RESPIRATORY (INHALATION) at 13:42

## 2018-10-16 RX ADMIN — INSULIN LISPRO 4 UNITS: 100 INJECTION, SOLUTION INTRAVENOUS; SUBCUTANEOUS at 12:11

## 2018-10-16 RX ADMIN — IPRATROPIUM BROMIDE AND ALBUTEROL SULFATE 1 ML: .5; 3 SOLUTION RESPIRATORY (INHALATION) at 08:03

## 2018-10-16 RX ADMIN — SODIUM CHLORIDE 1 UNITS: 4.5 INJECTION, SOLUTION INTRAVENOUS at 06:00

## 2018-10-16 RX ADMIN — HYDRALAZINE HYDROCHLORIDE 1 MG: 10 TABLET, FILM COATED ORAL at 20:30

## 2018-10-16 RX ADMIN — ATORVASTATIN CALCIUM 1 MG: 20 TABLET, FILM COATED ORAL at 07:49

## 2018-10-17 LAB
ALBUMIN/GLOBULIN RATIO: 0.49 (ref 1–1.93)
ALBUMIN: 2.6 GM/DL (ref 3.2–5.2)
ALKALINE PHOSPHATASE: 95 U/L (ref 45–117)
ALT SERPL W P-5'-P-CCNC: 14 U/L (ref 12–78)
ANION GAP: 7 MEQ/L (ref 8–16)
AST SERPL-CCNC: 8 U/L (ref 7–37)
BILIRUBIN,TOTAL: 0.3 MG/DL (ref 0.2–1)
BLOOD UREA NITROGEN: 46 MG/DL (ref 7–18)
CALCIUM LEVEL: 7.5 MG/DL (ref 8.8–10.2)
CARBON DIOXIDE LEVEL: 29 MEQ/L (ref 21–32)
CHLORIDE LEVEL: 99 MEQ/L (ref 98–107)
CREATININE FOR GFR: 2.27 MG/DL (ref 0.7–1.3)
CRP SERPL-MCNC: 2.87 MG/DL (ref 0–0.3)
GFR SERPL CREATININE-BSD FRML MDRD: 31.4 ML/MIN/{1.73_M2} (ref 49–?)
GLUCOSE BLDC GLUCOMTR-MCNC: 113 MG/DL (ref 80–115)
GLUCOSE BLDC GLUCOMTR-MCNC: 151 MG/DL (ref 80–115)
GLUCOSE BLDC GLUCOMTR-MCNC: 173 MG/DL (ref 80–115)
GLUCOSE BLDC GLUCOMTR-MCNC: 176 MG/DL (ref 80–115)
GLUCOSE, FASTING: 131 MG/DL (ref 70–100)
HEMATOCRIT: 35.8 % (ref 42–52)
HEMOGLOBIN: 10.8 G/DL (ref 13.5–17.5)
MAGNESIUM LEVEL: 1.6 MG/DL (ref 1.8–2.4)
MEAN CORPUSCULAR HEMOGLOBIN: 26.5 PG (ref 27–33)
MEAN CORPUSCULAR HGB CONC: 30.2 G/DL (ref 32–36.5)
MEAN CORPUSCULAR VOLUME: 87.7 FL (ref 80–96)
NRBC BLD AUTO-RTO: 0 % (ref 0–0)
PLATELET COUNT, AUTOMATED: 198 10^3/UL (ref 150–450)
POTASSIUM SERUM: 4.7 MEQ/L (ref 3.5–5.1)
RED BLOOD COUNT: 4.08 10^6/UL (ref 4.3–6.1)
RED CELL DISTRIBUTION WIDTH: 14.6 % (ref 11.5–14.5)
SODIUM LEVEL: 135 MEQ/L (ref 136–145)
TOTAL PROTEIN: 7.9 GM/DL (ref 6.4–8.2)
WHITE BLOOD COUNT: 12 10^3/UL (ref 4–10)

## 2018-10-17 RX ADMIN — HYDRALAZINE HYDROCHLORIDE 1 MG: 10 TABLET, FILM COATED ORAL at 21:02

## 2018-10-17 RX ADMIN — FUROSEMIDE 1 MG: 10 INJECTION, SOLUTION INTRAMUSCULAR; INTRAVENOUS at 17:21

## 2018-10-17 RX ADMIN — INSULIN LISPRO 2 UNITS: 100 INJECTION, SOLUTION INTRAVENOUS; SUBCUTANEOUS at 12:35

## 2018-10-17 RX ADMIN — MAGNESIUM OXIDE 1 MG: 400 TABLET ORAL at 10:21

## 2018-10-17 RX ADMIN — DOCUSATE SODIUM,SENNOSIDES 1 TAB: 50; 8.6 TABLET, FILM COATED ORAL at 21:00

## 2018-10-17 RX ADMIN — MAGNESIUM SULFATE IN DEXTROSE 1 MLS/HR: 10 INJECTION, SOLUTION INTRAVENOUS at 12:36

## 2018-10-17 RX ADMIN — SODIUM CHLORIDE 1 UNITS: 4.5 INJECTION, SOLUTION INTRAVENOUS at 22:00

## 2018-10-17 RX ADMIN — HYDRALAZINE HYDROCHLORIDE 1 MG: 10 TABLET, FILM COATED ORAL at 16:00

## 2018-10-17 RX ADMIN — INFLUENZA A VIRUS A/WISCONSIN/588/2019 (H1N1) RECOMBINANT HEMAGGLUTININ ANTIGEN, INFLUENZA A VIRUS A/DARWIN/6/2021 (H3N2) RECOMBINANT HEMAGGLUTININ ANTIGEN, INFLUENZA B VIRUS B/AUSTRIA/1359417/2021 RECOMBINANT HEMAGGLUTININ ANTIGEN, AND INFLUENZA B VIRUS B/PHUKET/3073/2013 RECOMBINANT HEMAGGLUTININ ANTIGEN 1 ML: 45; 45; 45; 45 INJECTION INTRAMUSCULAR at 08:52

## 2018-10-17 RX ADMIN — INSULIN LISPRO 1 UNITS: 100 INJECTION, SOLUTION INTRAVENOUS; SUBCUTANEOUS at 21:00

## 2018-10-17 RX ADMIN — LEVOFLOXACIN 1 MLS/HR: 5 INJECTION, SOLUTION INTRAVENOUS at 12:37

## 2018-10-17 RX ADMIN — SODIUM CHLORIDE 1 UNITS: 4.5 INJECTION, SOLUTION INTRAVENOUS at 05:10

## 2018-10-17 RX ADMIN — IPRATROPIUM BROMIDE AND ALBUTEROL SULFATE 1 ML: .5; 3 SOLUTION RESPIRATORY (INHALATION) at 08:00

## 2018-10-17 RX ADMIN — DOCUSATE SODIUM,SENNOSIDES 1 TAB: 50; 8.6 TABLET, FILM COATED ORAL at 08:54

## 2018-10-17 RX ADMIN — SODIUM CHLORIDE 1 UNITS: 4.5 INJECTION, SOLUTION INTRAVENOUS at 14:00

## 2018-10-17 RX ADMIN — ATORVASTATIN CALCIUM 1 MG: 20 TABLET, FILM COATED ORAL at 08:53

## 2018-10-17 RX ADMIN — INSULIN DETEMIR 1 UNITS: 100 INJECTION, SOLUTION SUBCUTANEOUS at 20:59

## 2018-10-17 RX ADMIN — IPRATROPIUM BROMIDE AND ALBUTEROL SULFATE 1 ML: .5; 3 SOLUTION RESPIRATORY (INHALATION) at 13:45

## 2018-10-17 RX ADMIN — INSULIN LISPRO 4 UNITS: 100 INJECTION, SOLUTION INTRAVENOUS; SUBCUTANEOUS at 08:53

## 2018-10-17 RX ADMIN — GABAPENTIN 1 MG: 300 CAPSULE ORAL at 17:21

## 2018-10-17 RX ADMIN — FUROSEMIDE 1 MG: 10 INJECTION, SOLUTION INTRAMUSCULAR; INTRAVENOUS at 10:20

## 2018-10-17 RX ADMIN — HYDRALAZINE HYDROCHLORIDE 1 MG: 10 TABLET, FILM COATED ORAL at 08:54

## 2018-10-17 RX ADMIN — IPRATROPIUM BROMIDE AND ALBUTEROL SULFATE 1 ML: .5; 3 SOLUTION RESPIRATORY (INHALATION) at 22:35

## 2018-10-17 RX ADMIN — INSULIN DETEMIR 1 UNITS: 100 INJECTION, SOLUTION SUBCUTANEOUS at 08:53

## 2018-10-17 RX ADMIN — GABAPENTIN 1 MG: 300 CAPSULE ORAL at 20:59

## 2018-10-17 RX ADMIN — TAMSULOSIN HYDROCHLORIDE 1 MG: 0.4 CAPSULE ORAL at 20:59

## 2018-10-17 RX ADMIN — ASPIRIN 1 MG: 81 TABLET ORAL at 08:53

## 2018-10-17 RX ADMIN — IPRATROPIUM BROMIDE AND ALBUTEROL SULFATE 1 ML: .5; 3 SOLUTION RESPIRATORY (INHALATION) at 02:00

## 2018-10-17 RX ADMIN — INSULIN LISPRO 4 UNITS: 100 INJECTION, SOLUTION INTRAVENOUS; SUBCUTANEOUS at 17:21

## 2018-10-17 RX ADMIN — GABAPENTIN 1 MG: 300 CAPSULE ORAL at 08:54

## 2018-10-18 LAB
ALBUMIN/GLOBULIN RATIO: 0.52 (ref 1–1.93)
ALBUMIN: 2.5 GM/DL (ref 3.2–5.2)
ALKALINE PHOSPHATASE: 92 U/L (ref 45–117)
ALT SERPL W P-5'-P-CCNC: 14 U/L (ref 12–78)
ANION GAP: 7 MEQ/L (ref 8–16)
AST SERPL-CCNC: 10 U/L (ref 7–37)
BILIRUBIN,TOTAL: 0.4 MG/DL (ref 0.2–1)
BLOOD UREA NITROGEN: 49 MG/DL (ref 7–18)
CALCIUM LEVEL: 7.4 MG/DL (ref 8.8–10.2)
CARBON DIOXIDE LEVEL: 29 MEQ/L (ref 21–32)
CHLORIDE LEVEL: 99 MEQ/L (ref 98–107)
CREATININE FOR GFR: 2.07 MG/DL (ref 0.7–1.3)
CRP SERPL-MCNC: 2.35 MG/DL (ref 0–0.3)
GFR SERPL CREATININE-BSD FRML MDRD: 34.9 ML/MIN/{1.73_M2} (ref 49–?)
GLUCOSE BLDC GLUCOMTR-MCNC: 155 MG/DL (ref 80–115)
GLUCOSE BLDC GLUCOMTR-MCNC: 155 MG/DL (ref 80–115)
GLUCOSE BLDC GLUCOMTR-MCNC: 187 MG/DL (ref 80–115)
GLUCOSE, FASTING: 145 MG/DL (ref 70–100)
HEMATOCRIT: 36 % (ref 42–52)
HEMOGLOBIN: 10.8 G/DL (ref 13.5–17.5)
MAGNESIUM LEVEL: 1.6 MG/DL (ref 1.8–2.4)
MEAN CORPUSCULAR HEMOGLOBIN: 25.8 PG (ref 27–33)
MEAN CORPUSCULAR HGB CONC: 30 G/DL (ref 32–36.5)
MEAN CORPUSCULAR VOLUME: 86.1 FL (ref 80–96)
NRBC BLD AUTO-RTO: 0 % (ref 0–0)
PLATELET COUNT, AUTOMATED: 214 10^3/UL (ref 150–450)
POTASSIUM SERUM: 4.9 MEQ/L (ref 3.5–5.1)
RED BLOOD COUNT: 4.18 10^6/UL (ref 4.3–6.1)
RED CELL DISTRIBUTION WIDTH: 14.4 % (ref 11.5–14.5)
SODIUM LEVEL: 135 MEQ/L (ref 136–145)
TOTAL PROTEIN: 7.3 GM/DL (ref 6.4–8.2)
WHITE BLOOD COUNT: 9 10^3/UL (ref 4–10)

## 2018-10-18 RX ADMIN — INSULIN LISPRO 2 UNITS: 100 INJECTION, SOLUTION INTRAVENOUS; SUBCUTANEOUS at 09:02

## 2018-10-18 RX ADMIN — LEVOFLOXACIN 1 MLS/HR: 5 INJECTION, SOLUTION INTRAVENOUS at 12:40

## 2018-10-18 RX ADMIN — FUROSEMIDE 1 MG: 10 INJECTION, SOLUTION INTRAMUSCULAR; INTRAVENOUS at 09:03

## 2018-10-18 RX ADMIN — IPRATROPIUM BROMIDE AND ALBUTEROL SULFATE 1 ML: .5; 3 SOLUTION RESPIRATORY (INHALATION) at 20:00

## 2018-10-18 RX ADMIN — GABAPENTIN 1 MG: 300 CAPSULE ORAL at 17:26

## 2018-10-18 RX ADMIN — INSULIN LISPRO 1 UNITS: 100 INJECTION, SOLUTION INTRAVENOUS; SUBCUTANEOUS at 21:00

## 2018-10-18 RX ADMIN — DOCUSATE SODIUM,SENNOSIDES 1 TAB: 50; 8.6 TABLET, FILM COATED ORAL at 09:00

## 2018-10-18 RX ADMIN — SODIUM CHLORIDE 1 UNITS: 4.5 INJECTION, SOLUTION INTRAVENOUS at 21:35

## 2018-10-18 RX ADMIN — INSULIN DETEMIR 1 UNITS: 100 INJECTION, SOLUTION SUBCUTANEOUS at 09:02

## 2018-10-18 RX ADMIN — SODIUM CHLORIDE 1 UNITS: 4.5 INJECTION, SOLUTION INTRAVENOUS at 05:09

## 2018-10-18 RX ADMIN — HYDRALAZINE HYDROCHLORIDE 1 MG: 10 TABLET, FILM COATED ORAL at 21:33

## 2018-10-18 RX ADMIN — INSULIN LISPRO 4 UNITS: 100 INJECTION, SOLUTION INTRAVENOUS; SUBCUTANEOUS at 17:26

## 2018-10-18 RX ADMIN — IPRATROPIUM BROMIDE AND ALBUTEROL SULFATE 1 ML: .5; 3 SOLUTION RESPIRATORY (INHALATION) at 07:40

## 2018-10-18 RX ADMIN — ASPIRIN 1 MG: 81 TABLET ORAL at 09:02

## 2018-10-18 RX ADMIN — DOCUSATE SODIUM,SENNOSIDES 1 TAB: 50; 8.6 TABLET, FILM COATED ORAL at 21:34

## 2018-10-18 RX ADMIN — INSULIN LISPRO 4 UNITS: 100 INJECTION, SOLUTION INTRAVENOUS; SUBCUTANEOUS at 12:41

## 2018-10-18 RX ADMIN — IPRATROPIUM BROMIDE AND ALBUTEROL SULFATE 1 ML: .5; 3 SOLUTION RESPIRATORY (INHALATION) at 14:59

## 2018-10-18 RX ADMIN — MAGNESIUM OXIDE 1 MG: 400 TABLET ORAL at 09:03

## 2018-10-18 RX ADMIN — TAMSULOSIN HYDROCHLORIDE 1 MG: 0.4 CAPSULE ORAL at 21:34

## 2018-10-18 RX ADMIN — GABAPENTIN 1 MG: 300 CAPSULE ORAL at 21:34

## 2018-10-18 RX ADMIN — GABAPENTIN 1 MG: 300 CAPSULE ORAL at 09:03

## 2018-10-18 RX ADMIN — HYDRALAZINE HYDROCHLORIDE 1 MG: 10 TABLET, FILM COATED ORAL at 09:00

## 2018-10-18 RX ADMIN — SODIUM CHLORIDE 1 UNITS: 4.5 INJECTION, SOLUTION INTRAVENOUS at 12:39

## 2018-10-18 RX ADMIN — HYDRALAZINE HYDROCHLORIDE 1 MG: 10 TABLET, FILM COATED ORAL at 16:54

## 2018-10-18 RX ADMIN — ATORVASTATIN CALCIUM 1 MG: 20 TABLET, FILM COATED ORAL at 09:02

## 2018-10-18 RX ADMIN — INSULIN DETEMIR 1 UNITS: 100 INJECTION, SOLUTION SUBCUTANEOUS at 21:35

## 2018-10-18 RX ADMIN — IPRATROPIUM BROMIDE AND ALBUTEROL SULFATE 1 ML: .5; 3 SOLUTION RESPIRATORY (INHALATION) at 01:46

## 2018-10-18 RX ADMIN — TORSEMIDE 1 MG: 20 TABLET ORAL at 17:26

## 2018-10-18 RX ADMIN — IPRATROPIUM BROMIDE AND ALBUTEROL SULFATE 1 ML: .5; 3 SOLUTION RESPIRATORY (INHALATION) at 14:00

## 2018-10-19 LAB
ALBUMIN/GLOBULIN RATIO: 0.53 (ref 1–1.93)
ALBUMIN: 2.6 GM/DL (ref 3.2–5.2)
ALKALINE PHOSPHATASE: 96 U/L (ref 45–117)
ALT SERPL W P-5'-P-CCNC: 11 U/L (ref 12–78)
ANION GAP: 3 MEQ/L (ref 8–16)
AST SERPL-CCNC: 10 U/L (ref 7–37)
BILIRUBIN,TOTAL: 0.5 MG/DL (ref 0.2–1)
BLOOD UREA NITROGEN: 50 MG/DL (ref 7–18)
CALCIUM LEVEL: 8.1 MG/DL (ref 8.8–10.2)
CARBON DIOXIDE LEVEL: 32 MEQ/L (ref 21–32)
CHLORIDE LEVEL: 101 MEQ/L (ref 98–107)
CREATININE FOR GFR: 1.96 MG/DL (ref 0.7–1.3)
CRP SERPL-MCNC: 2.07 MG/DL (ref 0–0.3)
GFR SERPL CREATININE-BSD FRML MDRD: 37.2 ML/MIN/{1.73_M2} (ref 49–?)
GLUCOSE BLDC GLUCOMTR-MCNC: 147 MG/DL (ref 80–115)
GLUCOSE BLDC GLUCOMTR-MCNC: 161 MG/DL (ref 80–115)
GLUCOSE BLDC GLUCOMTR-MCNC: 260 MG/DL (ref 80–115)
GLUCOSE, FASTING: 121 MG/DL (ref 70–100)
HEMATOCRIT: 37.8 % (ref 42–52)
HEMOGLOBIN: 11.5 G/DL (ref 13.5–17.5)
MAGNESIUM LEVEL: 1.7 MG/DL (ref 1.8–2.4)
MEAN CORPUSCULAR HEMOGLOBIN: 26.1 PG (ref 27–33)
MEAN CORPUSCULAR HGB CONC: 30.4 G/DL (ref 32–36.5)
MEAN CORPUSCULAR VOLUME: 85.7 FL (ref 80–96)
NRBC BLD AUTO-RTO: 0 % (ref 0–0)
PLATELET COUNT, AUTOMATED: 232 10^3/UL (ref 150–450)
POTASSIUM SERUM: 4.8 MEQ/L (ref 3.5–5.1)
RED BLOOD COUNT: 4.41 10^6/UL (ref 4.3–6.1)
RED CELL DISTRIBUTION WIDTH: 14.2 % (ref 11.5–14.5)
SODIUM LEVEL: 136 MEQ/L (ref 136–145)
TOTAL PROTEIN: 7.5 GM/DL (ref 6.4–8.2)
WHITE BLOOD COUNT: 8.3 10^3/UL (ref 4–10)

## 2018-10-19 RX ADMIN — IPRATROPIUM BROMIDE AND ALBUTEROL SULFATE 1 ML: .5; 3 SOLUTION RESPIRATORY (INHALATION) at 14:00

## 2018-10-19 RX ADMIN — TORSEMIDE 1 MG: 20 TABLET ORAL at 17:58

## 2018-10-19 RX ADMIN — GABAPENTIN 1 MG: 300 CAPSULE ORAL at 17:58

## 2018-10-19 RX ADMIN — HYDRALAZINE HYDROCHLORIDE 1 MG: 10 TABLET, FILM COATED ORAL at 20:53

## 2018-10-19 RX ADMIN — TORSEMIDE 1 MG: 20 TABLET ORAL at 09:09

## 2018-10-19 RX ADMIN — INSULIN LISPRO 2 UNITS: 100 INJECTION, SOLUTION INTRAVENOUS; SUBCUTANEOUS at 13:35

## 2018-10-19 RX ADMIN — ASPIRIN 1 MG: 81 TABLET ORAL at 09:10

## 2018-10-19 RX ADMIN — INSULIN DETEMIR 1 UNITS: 100 INJECTION, SOLUTION SUBCUTANEOUS at 09:11

## 2018-10-19 RX ADMIN — TAMSULOSIN HYDROCHLORIDE 1 MG: 0.4 CAPSULE ORAL at 20:54

## 2018-10-19 RX ADMIN — MAGNESIUM OXIDE 1 MG: 400 TABLET ORAL at 09:10

## 2018-10-19 RX ADMIN — HYDRALAZINE HYDROCHLORIDE 1 MG: 10 TABLET, FILM COATED ORAL at 09:10

## 2018-10-19 RX ADMIN — INSULIN LISPRO 4 UNITS: 100 INJECTION, SOLUTION INTRAVENOUS; SUBCUTANEOUS at 17:58

## 2018-10-19 RX ADMIN — SODIUM CHLORIDE 1 UNITS: 4.5 INJECTION, SOLUTION INTRAVENOUS at 13:35

## 2018-10-19 RX ADMIN — LEVOFLOXACIN 1 MLS/HR: 5 INJECTION, SOLUTION INTRAVENOUS at 13:35

## 2018-10-19 RX ADMIN — INSULIN LISPRO 2 UNITS: 100 INJECTION, SOLUTION INTRAVENOUS; SUBCUTANEOUS at 20:55

## 2018-10-19 RX ADMIN — DOCUSATE SODIUM,SENNOSIDES 1 TAB: 50; 8.6 TABLET, FILM COATED ORAL at 09:10

## 2018-10-19 RX ADMIN — SODIUM CHLORIDE 1 UNITS: 4.5 INJECTION, SOLUTION INTRAVENOUS at 21:10

## 2018-10-19 RX ADMIN — GABAPENTIN 1 MG: 300 CAPSULE ORAL at 20:54

## 2018-10-19 RX ADMIN — INSULIN LISPRO 2 UNITS: 100 INJECTION, SOLUTION INTRAVENOUS; SUBCUTANEOUS at 09:10

## 2018-10-19 RX ADMIN — SODIUM CHLORIDE 1 UNITS: 4.5 INJECTION, SOLUTION INTRAVENOUS at 06:00

## 2018-10-19 RX ADMIN — IPRATROPIUM BROMIDE AND ALBUTEROL SULFATE 1 ML: .5; 3 SOLUTION RESPIRATORY (INHALATION) at 01:54

## 2018-10-19 RX ADMIN — DOCUSATE SODIUM,SENNOSIDES 1 TAB: 50; 8.6 TABLET, FILM COATED ORAL at 20:54

## 2018-10-19 RX ADMIN — IPRATROPIUM BROMIDE AND ALBUTEROL SULFATE 1 ML: .5; 3 SOLUTION RESPIRATORY (INHALATION) at 07:56

## 2018-10-19 RX ADMIN — HYDRALAZINE HYDROCHLORIDE 1 MG: 10 TABLET, FILM COATED ORAL at 16:00

## 2018-10-19 RX ADMIN — GABAPENTIN 1 MG: 300 CAPSULE ORAL at 09:09

## 2018-10-19 RX ADMIN — INSULIN DETEMIR 1 UNITS: 100 INJECTION, SOLUTION SUBCUTANEOUS at 20:56

## 2018-10-19 RX ADMIN — ATORVASTATIN CALCIUM 1 MG: 20 TABLET, FILM COATED ORAL at 09:09

## 2018-10-19 RX ADMIN — IPRATROPIUM BROMIDE AND ALBUTEROL SULFATE 1 ML: .5; 3 SOLUTION RESPIRATORY (INHALATION) at 19:46

## 2018-10-20 LAB
ALBUMIN/GLOBULIN RATIO: 0.56 (ref 1–1.93)
ALBUMIN: 2.8 GM/DL (ref 3.2–5.2)
ALKALINE PHOSPHATASE: 100 U/L (ref 45–117)
ALT SERPL W P-5'-P-CCNC: 14 U/L (ref 12–78)
ANION GAP: 5 MEQ/L (ref 8–16)
AST SERPL-CCNC: 10 U/L (ref 7–37)
BILIRUBIN,TOTAL: 0.4 MG/DL (ref 0.2–1)
BLOOD UREA NITROGEN: 50 MG/DL (ref 7–18)
CALCIUM LEVEL: 8.5 MG/DL (ref 8.8–10.2)
CARBON DIOXIDE LEVEL: 31 MEQ/L (ref 21–32)
CHLORIDE LEVEL: 101 MEQ/L (ref 98–107)
CREATININE FOR GFR: 2.03 MG/DL (ref 0.7–1.3)
CRP SERPL-MCNC: 1.78 MG/DL (ref 0–0.3)
GFR SERPL CREATININE-BSD FRML MDRD: 35.7 ML/MIN/{1.73_M2} (ref 49–?)
GLUCOSE BLDC GLUCOMTR-MCNC: 124 MG/DL (ref 80–115)
GLUCOSE BLDC GLUCOMTR-MCNC: 135 MG/DL (ref 80–115)
GLUCOSE BLDC GLUCOMTR-MCNC: 198 MG/DL (ref 80–115)
GLUCOSE, FASTING: 133 MG/DL (ref 70–100)
HEMATOCRIT: 38.3 % (ref 42–52)
HEMOGLOBIN: 11.7 G/DL (ref 13.5–17.5)
MAGNESIUM LEVEL: 1.8 MG/DL (ref 1.8–2.4)
MEAN CORPUSCULAR HEMOGLOBIN: 26.2 PG (ref 27–33)
MEAN CORPUSCULAR HGB CONC: 30.5 G/DL (ref 32–36.5)
MEAN CORPUSCULAR VOLUME: 85.9 FL (ref 80–96)
NRBC BLD AUTO-RTO: 0 % (ref 0–0)
PLATELET COUNT, AUTOMATED: 222 10^3/UL (ref 150–450)
POTASSIUM SERUM: 5.1 MEQ/L (ref 3.5–5.1)
RED BLOOD COUNT: 4.46 10^6/UL (ref 4.3–6.1)
RED CELL DISTRIBUTION WIDTH: 14.1 % (ref 11.5–14.5)
SODIUM LEVEL: 137 MEQ/L (ref 136–145)
TOTAL PROTEIN: 7.8 GM/DL (ref 6.4–8.2)
WHITE BLOOD COUNT: 7.6 10^3/UL (ref 4–10)

## 2018-10-20 RX ADMIN — SODIUM CHLORIDE 1 UNITS: 4.5 INJECTION, SOLUTION INTRAVENOUS at 05:04

## 2018-10-20 RX ADMIN — INSULIN LISPRO 2 UNITS: 100 INJECTION, SOLUTION INTRAVENOUS; SUBCUTANEOUS at 08:27

## 2018-10-20 RX ADMIN — ASPIRIN 1 MG: 81 TABLET ORAL at 08:29

## 2018-10-20 RX ADMIN — SODIUM CHLORIDE 1 UNITS: 4.5 INJECTION, SOLUTION INTRAVENOUS at 21:31

## 2018-10-20 RX ADMIN — DOCUSATE SODIUM,SENNOSIDES 1 TAB: 50; 8.6 TABLET, FILM COATED ORAL at 08:29

## 2018-10-20 RX ADMIN — INSULIN DETEMIR 1 UNITS: 100 INJECTION, SOLUTION SUBCUTANEOUS at 21:30

## 2018-10-20 RX ADMIN — IPRATROPIUM BROMIDE AND ALBUTEROL SULFATE 1 ML: .5; 3 SOLUTION RESPIRATORY (INHALATION) at 07:29

## 2018-10-20 RX ADMIN — SODIUM CHLORIDE 1 UNITS: 4.5 INJECTION, SOLUTION INTRAVENOUS at 13:12

## 2018-10-20 RX ADMIN — INSULIN DETEMIR 1 UNITS: 100 INJECTION, SOLUTION SUBCUTANEOUS at 08:28

## 2018-10-20 RX ADMIN — HYDRALAZINE HYDROCHLORIDE 1 MG: 10 TABLET, FILM COATED ORAL at 16:00

## 2018-10-20 RX ADMIN — DOCUSATE SODIUM,SENNOSIDES 1 TAB: 50; 8.6 TABLET, FILM COATED ORAL at 21:30

## 2018-10-20 RX ADMIN — HYDRALAZINE HYDROCHLORIDE 1 MG: 10 TABLET, FILM COATED ORAL at 21:29

## 2018-10-20 RX ADMIN — GABAPENTIN 1 MG: 300 CAPSULE ORAL at 08:29

## 2018-10-20 RX ADMIN — INSULIN LISPRO 2 UNITS: 100 INJECTION, SOLUTION INTRAVENOUS; SUBCUTANEOUS at 12:46

## 2018-10-20 RX ADMIN — INSULIN LISPRO 1 UNITS: 100 INJECTION, SOLUTION INTRAVENOUS; SUBCUTANEOUS at 20:33

## 2018-10-20 RX ADMIN — LEVOFLOXACIN 1 MLS/HR: 5 INJECTION, SOLUTION INTRAVENOUS at 12:47

## 2018-10-20 RX ADMIN — IPRATROPIUM BROMIDE AND ALBUTEROL SULFATE 1 ML: .5; 3 SOLUTION RESPIRATORY (INHALATION) at 02:00

## 2018-10-20 RX ADMIN — TAMSULOSIN HYDROCHLORIDE 1 MG: 0.4 CAPSULE ORAL at 21:29

## 2018-10-20 RX ADMIN — TORSEMIDE 1 MG: 20 TABLET ORAL at 16:31

## 2018-10-20 RX ADMIN — IPRATROPIUM BROMIDE AND ALBUTEROL SULFATE 1 ML: .5; 3 SOLUTION RESPIRATORY (INHALATION) at 20:00

## 2018-10-20 RX ADMIN — MAGNESIUM OXIDE 1 MG: 400 TABLET ORAL at 08:30

## 2018-10-20 RX ADMIN — GABAPENTIN 1 MG: 300 CAPSULE ORAL at 21:30

## 2018-10-20 RX ADMIN — INSULIN LISPRO 2 UNITS: 100 INJECTION, SOLUTION INTRAVENOUS; SUBCUTANEOUS at 17:18

## 2018-10-20 RX ADMIN — TORSEMIDE 1 MG: 20 TABLET ORAL at 08:29

## 2018-10-20 RX ADMIN — GABAPENTIN 1 MG: 300 CAPSULE ORAL at 16:31

## 2018-10-20 RX ADMIN — ATORVASTATIN CALCIUM 1 MG: 20 TABLET, FILM COATED ORAL at 08:28

## 2018-10-20 RX ADMIN — HYDRALAZINE HYDROCHLORIDE 1 MG: 10 TABLET, FILM COATED ORAL at 08:36

## 2018-10-21 LAB
ALBUMIN/GLOBULIN RATIO: 0.54 (ref 1–1.93)
ALBUMIN: 2.7 GM/DL (ref 3.2–5.2)
ALKALINE PHOSPHATASE: 97 U/L (ref 45–117)
ALT SERPL W P-5'-P-CCNC: 13 U/L (ref 12–78)
ANION GAP: 5 MEQ/L (ref 8–16)
AST SERPL-CCNC: 10 U/L (ref 7–37)
BILIRUBIN,TOTAL: 0.5 MG/DL (ref 0.2–1)
BLOOD UREA NITROGEN: 51 MG/DL (ref 7–18)
CALCIUM LEVEL: 8.6 MG/DL (ref 8.8–10.2)
CARBON DIOXIDE LEVEL: 31 MEQ/L (ref 21–32)
CHLORIDE LEVEL: 100 MEQ/L (ref 98–107)
CREATININE FOR GFR: 2.12 MG/DL (ref 0.7–1.3)
CRP SERPL-MCNC: 1.56 MG/DL (ref 0–0.3)
GFR SERPL CREATININE-BSD FRML MDRD: 34 ML/MIN/{1.73_M2} (ref 49–?)
GLUCOSE BLDC GLUCOMTR-MCNC: 152 MG/DL (ref 80–115)
GLUCOSE BLDC GLUCOMTR-MCNC: 166 MG/DL (ref 80–115)
GLUCOSE BLDC GLUCOMTR-MCNC: 256 MG/DL (ref 80–115)
GLUCOSE, FASTING: 124 MG/DL (ref 70–100)
HEMATOCRIT: 38.9 % (ref 42–52)
HEMOGLOBIN: 11.8 G/DL (ref 13.5–17.5)
MAGNESIUM LEVEL: 1.6 MG/DL (ref 1.8–2.4)
MEAN CORPUSCULAR HEMOGLOBIN: 25.8 PG (ref 27–33)
MEAN CORPUSCULAR HGB CONC: 30.3 G/DL (ref 32–36.5)
MEAN CORPUSCULAR VOLUME: 85.1 FL (ref 80–96)
NRBC BLD AUTO-RTO: 0 % (ref 0–0)
PLATELET COUNT, AUTOMATED: 223 10^3/UL (ref 150–450)
POTASSIUM SERUM: 4.9 MEQ/L (ref 3.5–5.1)
RED BLOOD COUNT: 4.57 10^6/UL (ref 4.3–6.1)
RED CELL DISTRIBUTION WIDTH: 14 % (ref 11.5–14.5)
SODIUM LEVEL: 136 MEQ/L (ref 136–145)
TOTAL PROTEIN: 7.7 GM/DL (ref 6.4–8.2)
WHITE BLOOD COUNT: 8 10^3/UL (ref 4–10)

## 2018-10-21 RX ADMIN — SODIUM CHLORIDE 1 UNITS: 4.5 INJECTION, SOLUTION INTRAVENOUS at 05:10

## 2018-10-21 RX ADMIN — HYDRALAZINE HYDROCHLORIDE 1 MG: 10 TABLET, FILM COATED ORAL at 20:36

## 2018-10-21 RX ADMIN — LEVOFLOXACIN 1 MLS/HR: 5 INJECTION, SOLUTION INTRAVENOUS at 12:18

## 2018-10-21 RX ADMIN — TORSEMIDE 1 MG: 20 TABLET ORAL at 17:43

## 2018-10-21 RX ADMIN — SODIUM CHLORIDE 1 UNITS: 4.5 INJECTION, SOLUTION INTRAVENOUS at 14:00

## 2018-10-21 RX ADMIN — GABAPENTIN 1 MG: 300 CAPSULE ORAL at 09:22

## 2018-10-21 RX ADMIN — MAGNESIUM OXIDE 1 MG: 400 TABLET ORAL at 09:22

## 2018-10-21 RX ADMIN — SODIUM CHLORIDE 1 UNITS: 4.5 INJECTION, SOLUTION INTRAVENOUS at 20:40

## 2018-10-21 RX ADMIN — INSULIN LISPRO 4 UNITS: 100 INJECTION, SOLUTION INTRAVENOUS; SUBCUTANEOUS at 17:44

## 2018-10-21 RX ADMIN — INSULIN LISPRO 4 UNITS: 100 INJECTION, SOLUTION INTRAVENOUS; SUBCUTANEOUS at 12:19

## 2018-10-21 RX ADMIN — SODIUM CHLORIDE 1 UNITS: 4.5 INJECTION, SOLUTION INTRAVENOUS at 20:36

## 2018-10-21 RX ADMIN — IPRATROPIUM BROMIDE AND ALBUTEROL SULFATE 1 ML: .5; 3 SOLUTION RESPIRATORY (INHALATION) at 13:27

## 2018-10-21 RX ADMIN — INSULIN LISPRO 2 UNITS: 100 INJECTION, SOLUTION INTRAVENOUS; SUBCUTANEOUS at 09:23

## 2018-10-21 RX ADMIN — MAGNESIUM SULFATE IN DEXTROSE 1 MLS/HR: 10 INJECTION, SOLUTION INTRAVENOUS at 09:23

## 2018-10-21 RX ADMIN — GABAPENTIN 1 MG: 300 CAPSULE ORAL at 20:36

## 2018-10-21 RX ADMIN — HYDRALAZINE HYDROCHLORIDE 1 MG: 10 TABLET, FILM COATED ORAL at 17:43

## 2018-10-21 RX ADMIN — Medication 1 EA: at 09:00

## 2018-10-21 RX ADMIN — DOCUSATE SODIUM,SENNOSIDES 1 TAB: 50; 8.6 TABLET, FILM COATED ORAL at 09:00

## 2018-10-21 RX ADMIN — HYDRALAZINE HYDROCHLORIDE 1 MG: 10 TABLET, FILM COATED ORAL at 09:22

## 2018-10-21 RX ADMIN — INSULIN DETEMIR 1 UNITS: 100 INJECTION, SOLUTION SUBCUTANEOUS at 20:37

## 2018-10-21 RX ADMIN — LISINOPRIL 1 MG: 5 TABLET ORAL at 09:00

## 2018-10-21 RX ADMIN — GABAPENTIN 1 MG: 300 CAPSULE ORAL at 17:43

## 2018-10-21 RX ADMIN — TORSEMIDE 1 MG: 20 TABLET ORAL at 09:22

## 2018-10-21 RX ADMIN — IPRATROPIUM BROMIDE AND ALBUTEROL SULFATE 1 ML: .5; 3 SOLUTION RESPIRATORY (INHALATION) at 02:00

## 2018-10-21 RX ADMIN — INSULIN LISPRO 2 UNITS: 100 INJECTION, SOLUTION INTRAVENOUS; SUBCUTANEOUS at 20:37

## 2018-10-21 RX ADMIN — IPRATROPIUM BROMIDE AND ALBUTEROL SULFATE 1 ML: .5; 3 SOLUTION RESPIRATORY (INHALATION) at 08:24

## 2018-10-21 RX ADMIN — ASPIRIN 1 MG: 81 TABLET ORAL at 09:22

## 2018-10-21 RX ADMIN — DOCUSATE SODIUM,SENNOSIDES 1 TAB: 50; 8.6 TABLET, FILM COATED ORAL at 20:35

## 2018-10-21 RX ADMIN — INSULIN DETEMIR 1 UNITS: 100 INJECTION, SOLUTION SUBCUTANEOUS at 09:23

## 2018-10-21 RX ADMIN — TAMSULOSIN HYDROCHLORIDE 1 MG: 0.4 CAPSULE ORAL at 20:35

## 2018-10-21 RX ADMIN — IPRATROPIUM BROMIDE AND ALBUTEROL SULFATE 1 ML: .5; 3 SOLUTION RESPIRATORY (INHALATION) at 19:17

## 2018-10-21 RX ADMIN — ATORVASTATIN CALCIUM 1 MG: 20 TABLET, FILM COATED ORAL at 09:22

## 2018-10-22 LAB
ALBUMIN/GLOBULIN RATIO: 0.59 (ref 1–1.93)
ALBUMIN: 3 GM/DL (ref 3.2–5.2)
ALKALINE PHOSPHATASE: 99 U/L (ref 45–117)
ALT SERPL W P-5'-P-CCNC: 15 U/L (ref 12–78)
ANION GAP: 5 MEQ/L (ref 8–16)
AST SERPL-CCNC: 11 U/L (ref 7–37)
BILIRUBIN,TOTAL: 0.5 MG/DL (ref 0.2–1)
BLOOD UREA NITROGEN: 52 MG/DL (ref 7–18)
CALCIUM LEVEL: 8.9 MG/DL (ref 8.8–10.2)
CARBON DIOXIDE LEVEL: 32 MEQ/L (ref 21–32)
CHLORIDE LEVEL: 98 MEQ/L (ref 98–107)
CREATININE FOR GFR: 2.3 MG/DL (ref 0.7–1.3)
GFR SERPL CREATININE-BSD FRML MDRD: 30.9 ML/MIN/{1.73_M2} (ref 49–?)
GLUCOSE, FASTING: 140 MG/DL (ref 70–100)
HEMATOCRIT: 39.4 % (ref 42–52)
HEMOGLOBIN: 12 G/DL (ref 13.5–17.5)
MAGNESIUM LEVEL: 1.9 MG/DL (ref 1.8–2.4)
MEAN CORPUSCULAR HEMOGLOBIN: 26.1 PG (ref 27–33)
MEAN CORPUSCULAR HGB CONC: 30.5 G/DL (ref 32–36.5)
MEAN CORPUSCULAR VOLUME: 85.7 FL (ref 80–96)
NRBC BLD AUTO-RTO: 0 % (ref 0–0)
PLATELET COUNT, AUTOMATED: 247 10^3/UL (ref 150–450)
POTASSIUM SERUM: 5.3 MEQ/L (ref 3.5–5.1)
RED BLOOD COUNT: 4.6 10^6/UL (ref 4.3–6.1)
RED CELL DISTRIBUTION WIDTH: 14.2 % (ref 11.5–14.5)
SODIUM LEVEL: 135 MEQ/L (ref 136–145)
TOTAL PROTEIN: 8.1 GM/DL (ref 6.4–8.2)
WHITE BLOOD COUNT: 8.6 10^3/UL (ref 4–10)

## 2018-10-22 RX ADMIN — TORSEMIDE 1 MG: 20 TABLET ORAL at 09:22

## 2018-10-22 RX ADMIN — ATORVASTATIN CALCIUM 1 MG: 20 TABLET, FILM COATED ORAL at 09:24

## 2018-10-22 RX ADMIN — HYDRALAZINE HYDROCHLORIDE 1 MG: 10 TABLET, FILM COATED ORAL at 09:23

## 2018-10-22 RX ADMIN — INSULIN LISPRO 1 UNITS: 100 INJECTION, SOLUTION INTRAVENOUS; SUBCUTANEOUS at 11:50

## 2018-10-22 RX ADMIN — ASPIRIN 1 MG: 81 TABLET ORAL at 09:22

## 2018-10-22 RX ADMIN — INSULIN DETEMIR 1 UNITS: 100 INJECTION, SOLUTION SUBCUTANEOUS at 09:24

## 2018-10-22 RX ADMIN — MAGNESIUM OXIDE 1 MG: 400 TABLET ORAL at 09:23

## 2018-10-22 RX ADMIN — DOCUSATE SODIUM,SENNOSIDES 1 TAB: 50; 8.6 TABLET, FILM COATED ORAL at 09:22

## 2018-10-22 RX ADMIN — IPRATROPIUM BROMIDE AND ALBUTEROL SULFATE 1 ML: .5; 3 SOLUTION RESPIRATORY (INHALATION) at 07:44

## 2018-10-22 RX ADMIN — IPRATROPIUM BROMIDE AND ALBUTEROL SULFATE 1 ML: .5; 3 SOLUTION RESPIRATORY (INHALATION) at 02:00

## 2018-10-22 RX ADMIN — LISINOPRIL 1 MG: 5 TABLET ORAL at 09:24

## 2018-10-22 RX ADMIN — INSULIN LISPRO 2 UNITS: 100 INJECTION, SOLUTION INTRAVENOUS; SUBCUTANEOUS at 09:24

## 2018-10-22 RX ADMIN — GABAPENTIN 1 MG: 300 CAPSULE ORAL at 09:23

## 2018-11-28 ENCOUNTER — HOSPITAL ENCOUNTER (OUTPATIENT)
Dept: HOSPITAL 53 - M LAB REF | Age: 63
End: 2018-11-28
Attending: PODIATRIST
Payer: MEDICARE

## 2018-11-28 DIAGNOSIS — L03.031: Primary | ICD-10-CM

## 2018-11-28 PROCEDURE — 87077 CULTURE AEROBIC IDENTIFY: CPT

## 2018-12-27 ENCOUNTER — HOSPITAL ENCOUNTER (EMERGENCY)
Dept: HOSPITAL 53 - M ED | Age: 63
Discharge: HOME | End: 2018-12-27
Payer: MEDICARE

## 2018-12-27 VITALS — DIASTOLIC BLOOD PRESSURE: 80 MMHG | SYSTOLIC BLOOD PRESSURE: 178 MMHG

## 2018-12-27 DIAGNOSIS — Z88.8: ICD-10-CM

## 2018-12-27 DIAGNOSIS — Z79.899: ICD-10-CM

## 2018-12-27 DIAGNOSIS — E66.01: ICD-10-CM

## 2018-12-27 DIAGNOSIS — E78.5: ICD-10-CM

## 2018-12-27 DIAGNOSIS — I50.9: Primary | ICD-10-CM

## 2018-12-27 DIAGNOSIS — N28.9: ICD-10-CM

## 2018-12-27 DIAGNOSIS — E11.621: ICD-10-CM

## 2018-12-27 DIAGNOSIS — L97.501: ICD-10-CM

## 2018-12-27 DIAGNOSIS — J44.9: ICD-10-CM

## 2018-12-27 DIAGNOSIS — I87.2: ICD-10-CM

## 2018-12-27 DIAGNOSIS — I11.0: ICD-10-CM

## 2018-12-27 DIAGNOSIS — Z95.5: ICD-10-CM

## 2018-12-27 DIAGNOSIS — Z79.4: ICD-10-CM

## 2018-12-27 DIAGNOSIS — Z79.82: ICD-10-CM

## 2018-12-27 LAB
BASE EXCESS BLDA CALC-SCNC: 4.2 MMOL/L (ref -2–2)
BASOPHILS # BLD AUTO: 0 10^3/UL (ref 0–0.2)
BASOPHILS NFR BLD AUTO: 0.3 % (ref 0–1)
BUN SERPL-MCNC: 47 MG/DL (ref 7–18)
CALCIUM SERPL-MCNC: 7.8 MG/DL (ref 8.8–10.2)
CHLORIDE SERPL-SCNC: 94 MEQ/L (ref 98–107)
CK MB CFR.DF SERPL CALC: 3.26
CK MB SERPL-MCNC: 3 NG/ML (ref ?–3.6)
CK SERPL-CCNC: 95 U/L (ref 39–308)
CO2 BLDA CALC-SCNC: 31.9 MEQ/L (ref 23–31)
CO2 SERPL-SCNC: 32 MEQ/L (ref 21–32)
CREAT SERPL-MCNC: 2.56 MG/DL (ref 0.7–1.3)
EOSINOPHIL # BLD AUTO: 0.5 10^3/UL (ref 0–0.5)
EOSINOPHIL NFR BLD AUTO: 5.9 % (ref 0–3)
GFR SERPL CREATININE-BSD FRML MDRD: 27.3 ML/MIN/{1.73_M2} (ref 49–?)
GLUCOSE SERPL-MCNC: 382 MG/DL (ref 70–100)
HCO3 BLDA-SCNC: 30.3 MEQ/L (ref 22–26)
HCO3 STD BLDA-SCNC: 28.2 MEQ/L (ref 22–26)
HCT VFR BLD AUTO: 36.7 % (ref 42–52)
HGB BLD-MCNC: 11 G/DL (ref 13.5–17.5)
LYMPHOCYTES # BLD AUTO: 1.3 10^3/UL (ref 1.5–4.5)
LYMPHOCYTES NFR BLD AUTO: 14.9 % (ref 24–44)
MCH RBC QN AUTO: 25.2 PG (ref 27–33)
MCHC RBC AUTO-ENTMCNC: 30 G/DL (ref 32–36.5)
MCV RBC AUTO: 84 FL (ref 80–96)
MONOCYTES # BLD AUTO: 0.6 10^3/UL (ref 0–0.8)
MONOCYTES NFR BLD AUTO: 7.4 % (ref 0–5)
NEUTROPHILS # BLD AUTO: 6.1 10^3/UL (ref 1.8–7.7)
NEUTROPHILS NFR BLD AUTO: 70.9 % (ref 36–66)
NT-PRO BNP: 947 PG/ML (ref ?–125)
PCO2 BLDA: 51.9 MMHG (ref 35–45)
PH BLDA: 7.38 UNITS (ref 7.35–7.45)
PLATELET # BLD AUTO: 202 10^3/UL (ref 150–450)
PO2 BLDA: 66.9 MMHG (ref 75–100)
POTASSIUM SERPL-SCNC: 4.5 MEQ/L (ref 3.5–5.1)
RBC # BLD AUTO: 4.37 10^6/UL (ref 4.3–6.1)
SAO2 % BLDA: 93 % (ref 95–99)
SODIUM SERPL-SCNC: 132 MEQ/L (ref 136–145)
TROPONIN I SERPL-MCNC: 0.02 NG/ML (ref ?–0.1)
WBC # BLD AUTO: 8.7 10^3/UL (ref 4–10)

## 2018-12-27 PROCEDURE — 71045 X-RAY EXAM CHEST 1 VIEW: CPT

## 2018-12-27 PROCEDURE — 85025 COMPLETE CBC W/AUTO DIFF WBC: CPT

## 2018-12-27 PROCEDURE — 84484 ASSAY OF TROPONIN QUANT: CPT

## 2018-12-27 PROCEDURE — 96375 TX/PRO/DX INJ NEW DRUG ADDON: CPT

## 2018-12-27 PROCEDURE — 82550 ASSAY OF CK (CPK): CPT

## 2018-12-27 PROCEDURE — 94640 AIRWAY INHALATION TREATMENT: CPT

## 2018-12-27 PROCEDURE — 96374 THER/PROPH/DIAG INJ IV PUSH: CPT

## 2018-12-27 PROCEDURE — 36600 WITHDRAWAL OF ARTERIAL BLOOD: CPT

## 2018-12-27 PROCEDURE — 82553 CREATINE MB FRACTION: CPT

## 2018-12-27 PROCEDURE — 80048 BASIC METABOLIC PNL TOTAL CA: CPT

## 2018-12-27 PROCEDURE — 93041 RHYTHM ECG TRACING: CPT

## 2018-12-27 PROCEDURE — 83880 ASSAY OF NATRIURETIC PEPTIDE: CPT

## 2018-12-27 PROCEDURE — 93005 ELECTROCARDIOGRAM TRACING: CPT

## 2018-12-27 PROCEDURE — 99285 EMERGENCY DEPT VISIT HI MDM: CPT

## 2018-12-27 PROCEDURE — 82803 BLOOD GASES ANY COMBINATION: CPT

## 2018-12-27 NOTE — REP
Clinical:  Shortness of breath.

 

Comparison:  10/15/2018.

 

Findings:

Cardiomegaly is again appreciated.  Evaluation is limited by portable technique

and underpenetration which accentuate the pulmonary vasculature and interstitium.

Left lower lobe opacity appears increased from prior examination suggesting

infiltrate and effusion.  No pneumothorax.  Skeletal structures intact.

 

Impression:

Cannot exclude pulmonary vascular congestion and interstitial edema along with

left lower lobe opacity suggesting infiltrate and effusion.

 

 

Electronically Signed by

Carl Benavides MD 12/27/2018 07:47 A

## 2018-12-27 NOTE — ECGEPIP
Stationary ECG Study

                           [facility] - ED

                                       

                                       Test Date:    2018

Pat Name:     ALESIA SANTORO           Department:   

Patient ID:   [id number]                 Room:         -

Gender:       M                        Technician:   gt

:          1955               Requested By: DAMIÁN REYNA 

Order Number: [id number]     Reading MD:   Jean Saab

                                 Measurements

Intervals                              Axis          

Rate:         75                       P:            50

[place]:           137                      QRS:          66

QRSD:         166                      T:            43

QT:           430                                    

QTc:          480                                    

                           Interpretive Statements

SINUS RHYTHM

RIGHT BUNDLE BRANCH BLOCK, NEW COMPARED TO 10/15/18

PRIOR INFERIOR INFARCT

 

Electronically Signed On 2018 13:16:10 EST by Jean Saab

## 2019-01-07 ENCOUNTER — HOSPITAL ENCOUNTER (OUTPATIENT)
Dept: HOSPITAL 53 - M LAB REF | Age: 64
End: 2019-01-07
Attending: INTERNAL MEDICINE
Payer: MEDICARE

## 2019-01-07 DIAGNOSIS — D63.1: ICD-10-CM

## 2019-01-07 DIAGNOSIS — N18.3: Primary | ICD-10-CM

## 2019-01-07 LAB
FERRITIN SERPL-MCNC: 17 NG/ML (ref 26–388)
IRON SATN MFR SERPL: 9.3 % (ref 19.7–50)
IRON SERPL-MCNC: 28 UG/DL (ref 65–175)
TIBC SERPL-MCNC: 302 UG/DL (ref 250–450)

## 2019-01-12 ENCOUNTER — HOSPITAL ENCOUNTER (INPATIENT)
Dept: HOSPITAL 53 - M ED | Age: 64
LOS: 5 days | Discharge: HOME HEALTH | DRG: 291 | End: 2019-01-17
Attending: INTERNAL MEDICINE | Admitting: FAMILY MEDICINE
Payer: MEDICARE

## 2019-01-12 VITALS — DIASTOLIC BLOOD PRESSURE: 80 MMHG | SYSTOLIC BLOOD PRESSURE: 168 MMHG

## 2019-01-12 VITALS — HEIGHT: 72 IN | WEIGHT: 315 LBS | BODY MASS INDEX: 42.66 KG/M2

## 2019-01-12 VITALS — SYSTOLIC BLOOD PRESSURE: 149 MMHG | DIASTOLIC BLOOD PRESSURE: 66 MMHG

## 2019-01-12 DIAGNOSIS — Z88.8: ICD-10-CM

## 2019-01-12 DIAGNOSIS — Z79.82: ICD-10-CM

## 2019-01-12 DIAGNOSIS — D63.1: ICD-10-CM

## 2019-01-12 DIAGNOSIS — E78.5: ICD-10-CM

## 2019-01-12 DIAGNOSIS — Z79.899: ICD-10-CM

## 2019-01-12 DIAGNOSIS — G47.33: ICD-10-CM

## 2019-01-12 DIAGNOSIS — E11.40: ICD-10-CM

## 2019-01-12 DIAGNOSIS — E55.9: ICD-10-CM

## 2019-01-12 DIAGNOSIS — I27.20: ICD-10-CM

## 2019-01-12 DIAGNOSIS — I50.41: ICD-10-CM

## 2019-01-12 DIAGNOSIS — E11.21: ICD-10-CM

## 2019-01-12 DIAGNOSIS — J44.9: ICD-10-CM

## 2019-01-12 DIAGNOSIS — M10.9: ICD-10-CM

## 2019-01-12 DIAGNOSIS — E11.621: ICD-10-CM

## 2019-01-12 DIAGNOSIS — I13.0: Primary | ICD-10-CM

## 2019-01-12 DIAGNOSIS — N40.0: ICD-10-CM

## 2019-01-12 DIAGNOSIS — N18.4: ICD-10-CM

## 2019-01-12 DIAGNOSIS — E66.01: ICD-10-CM

## 2019-01-12 LAB
BASOPHILS # BLD AUTO: 0 10^3/UL (ref 0–0.2)
BASOPHILS NFR BLD AUTO: 0.3 % (ref 0–1)
BUN SERPL-MCNC: 35 MG/DL (ref 7–18)
CALCIUM SERPL-MCNC: 8.1 MG/DL (ref 8.8–10.2)
CHLORIDE SERPL-SCNC: 98 MEQ/L (ref 98–107)
CK MB CFR.DF SERPL CALC: 4.37
CK MB CFR.DF SERPL CALC: 4.75
CK MB SERPL-MCNC: 3 NG/ML (ref ?–3.6)
CK MB SERPL-MCNC: 3 NG/ML (ref ?–3.6)
CK SERPL-CCNC: 61 U/L (ref 39–308)
CK SERPL-CCNC: 71 U/L (ref 39–308)
CO2 SERPL-SCNC: 27 MEQ/L (ref 21–32)
CREAT SERPL-MCNC: 1.98 MG/DL (ref 0.7–1.3)
EOSINOPHIL # BLD AUTO: 0.3 10^3/UL (ref 0–0.5)
EOSINOPHIL NFR BLD AUTO: 4.2 % (ref 0–3)
GFR SERPL CREATININE-BSD FRML MDRD: 36.8 ML/MIN/{1.73_M2} (ref 49–?)
GLUCOSE SERPL-MCNC: 402 MG/DL (ref 70–100)
HCT VFR BLD AUTO: 35.1 % (ref 42–52)
HGB BLD-MCNC: 10.3 G/DL (ref 13.5–17.5)
LYMPHOCYTES # BLD AUTO: 1.1 10^3/UL (ref 1.5–4.5)
LYMPHOCYTES NFR BLD AUTO: 14.2 % (ref 24–44)
MCH RBC QN AUTO: 24.5 PG (ref 27–33)
MCHC RBC AUTO-ENTMCNC: 29.3 G/DL (ref 32–36.5)
MCV RBC AUTO: 83.6 FL (ref 80–96)
MONOCYTES # BLD AUTO: 0.5 10^3/UL (ref 0–0.8)
MONOCYTES NFR BLD AUTO: 6.7 % (ref 0–5)
NEUTROPHILS # BLD AUTO: 5.7 10^3/UL (ref 1.8–7.7)
NEUTROPHILS NFR BLD AUTO: 74.1 % (ref 36–66)
NT-PRO BNP: 1084 PG/ML (ref ?–125)
PLATELET # BLD AUTO: 154 10^3/UL (ref 150–450)
POTASSIUM SERPL-SCNC: 4.6 MEQ/L (ref 3.5–5.1)
RBC # BLD AUTO: 4.2 10^6/UL (ref 4.3–6.1)
SODIUM SERPL-SCNC: 134 MEQ/L (ref 136–145)
TROPONIN I SERPL-MCNC: < 0.02 NG/ML (ref ?–0.1)
TROPONIN I SERPL-MCNC: < 0.02 NG/ML (ref ?–0.1)
WBC # BLD AUTO: 7.6 10^3/UL (ref 4–10)

## 2019-01-12 RX ADMIN — FUROSEMIDE SCH MG: 10 INJECTION, SOLUTION INTRAMUSCULAR; INTRAVENOUS at 16:35

## 2019-01-12 RX ADMIN — ENOXAPARIN SODIUM SCH MG: 40 INJECTION SUBCUTANEOUS at 09:00

## 2019-01-12 RX ADMIN — INSULIN DETEMIR SCH UNITS: 100 INJECTION, SOLUTION SUBCUTANEOUS at 20:54

## 2019-01-12 RX ADMIN — FUROSEMIDE SCH MG: 10 INJECTION, SOLUTION INTRAMUSCULAR; INTRAVENOUS at 19:40

## 2019-01-12 RX ADMIN — HYDRALAZINE HYDROCHLORIDE SCH MG: 10 TABLET, FILM COATED ORAL at 20:55

## 2019-01-12 RX ADMIN — INSULIN LISPRO SCH UNITS: 100 INJECTION, SOLUTION INTRAVENOUS; SUBCUTANEOUS at 20:55

## 2019-01-12 RX ADMIN — HYDRALAZINE HYDROCHLORIDE SCH MG: 10 TABLET, FILM COATED ORAL at 16:36

## 2019-01-12 RX ADMIN — GABAPENTIN SCH MG: 300 CAPSULE ORAL at 16:35

## 2019-01-12 RX ADMIN — ENOXAPARIN SODIUM SCH MG: 40 INJECTION SUBCUTANEOUS at 16:34

## 2019-01-12 RX ADMIN — GABAPENTIN SCH MG: 300 CAPSULE ORAL at 20:55

## 2019-01-12 RX ADMIN — TAMSULOSIN HYDROCHLORIDE SCH MG: 0.4 CAPSULE ORAL at 20:55

## 2019-01-12 RX ADMIN — INSULIN LISPRO SCH UNITS: 100 INJECTION, SOLUTION INTRAVENOUS; SUBCUTANEOUS at 17:03

## 2019-01-12 NOTE — REP
Chest x-ray:  Two views.

 

History:  Shortness of breath and hypoxia.

 

Comparison chest x-ray:  December 27, 2017.

 

Findings:  Moderate cardiac enlargement is again noted unchanged.  The left

hemidiaphragm is obscured and the lateral pleural angle is blunted consistent

with a small to moderate left pleural effusion.  This is unchanged.  Pulmonary

vasculature remains cephalized and somewhat congested.  There is some fissural

thickening on the right.  There are degenerative changes in the thoracic spine.

 

Impression:

 

CHF pattern with vascular congestion and left pleural effusion.  Cardiomegaly.

 

 

Electronically Signed by

Gulshan Wellington MD 01/12/2019 02:30 P

## 2019-01-12 NOTE — ECGEPIP
Stationary ECG Study

                           [facility] - ED

                                       

                                       Test Date:    2019

Pat Name:     ALESIA SANTORO           Department:   

Patient ID:   [id number]                 Room:         -

Gender:       M                        Technician:   ROLF

:          1955               Requested By: Rabia Trinidad 

Order Number: [id number]     Reading MD:   Rabia Trinidad

                                 Measurements

Intervals                              Axis          

Rate:         79                       P:            35

[place]:           176                      QRS:          84

QRSD:         103                      T:            50

QT:           382                                    

QTc:          439                                    

                           Interpretive Statements

SINUS RHYTHM

LOW QRS VOLTAGE IN PRECORDIAL LEADS

SEPTAL MYOCARDIAL INFARCTION, PROBABLY OLD

Right bundle branch block 18

Electronically Signed On 2019 12:58:26 EST by Rabia Trinidad

## 2019-01-13 VITALS — DIASTOLIC BLOOD PRESSURE: 78 MMHG | SYSTOLIC BLOOD PRESSURE: 180 MMHG

## 2019-01-13 VITALS — DIASTOLIC BLOOD PRESSURE: 70 MMHG | SYSTOLIC BLOOD PRESSURE: 142 MMHG

## 2019-01-13 VITALS — SYSTOLIC BLOOD PRESSURE: 110 MMHG | DIASTOLIC BLOOD PRESSURE: 80 MMHG

## 2019-01-13 VITALS — DIASTOLIC BLOOD PRESSURE: 62 MMHG | SYSTOLIC BLOOD PRESSURE: 130 MMHG

## 2019-01-13 VITALS — SYSTOLIC BLOOD PRESSURE: 130 MMHG | DIASTOLIC BLOOD PRESSURE: 80 MMHG

## 2019-01-13 VITALS — DIASTOLIC BLOOD PRESSURE: 70 MMHG | SYSTOLIC BLOOD PRESSURE: 148 MMHG

## 2019-01-13 LAB
BUN SERPL-MCNC: 35 MG/DL (ref 7–18)
CALCIUM SERPL-MCNC: 8.5 MG/DL (ref 8.8–10.2)
CHLORIDE SERPL-SCNC: 97 MEQ/L (ref 98–107)
CK MB CFR.DF SERPL CALC: 5.45
CK MB SERPL-MCNC: 3 NG/ML (ref ?–3.6)
CK SERPL-CCNC: 55 U/L (ref 39–308)
CO2 SERPL-SCNC: 30 MEQ/L (ref 21–32)
CREAT SERPL-MCNC: 1.88 MG/DL (ref 0.7–1.3)
GFR SERPL CREATININE-BSD FRML MDRD: 39 ML/MIN/{1.73_M2} (ref 49–?)
GLUCOSE SERPL-MCNC: 325 MG/DL (ref 70–100)
HCT VFR BLD AUTO: 37.7 % (ref 42–52)
HGB BLD-MCNC: 11.2 G/DL (ref 13.5–17.5)
MCH RBC QN AUTO: 24.3 PG (ref 27–33)
MCHC RBC AUTO-ENTMCNC: 29.7 G/DL (ref 32–36.5)
MCV RBC AUTO: 82 FL (ref 80–96)
PLATELET # BLD AUTO: 168 10^3/UL (ref 150–450)
POTASSIUM SERPL-SCNC: 4.6 MEQ/L (ref 3.5–5.1)
RBC # BLD AUTO: 4.6 10^6/UL (ref 4.3–6.1)
SODIUM SERPL-SCNC: 134 MEQ/L (ref 136–145)
TROPONIN I SERPL-MCNC: < 0.02 NG/ML (ref ?–0.1)
WBC # BLD AUTO: 8.2 10^3/UL (ref 4–10)

## 2019-01-13 RX ADMIN — TAMSULOSIN HYDROCHLORIDE SCH MG: 0.4 CAPSULE ORAL at 21:08

## 2019-01-13 RX ADMIN — INSULIN LISPRO SCH UNITS: 100 INJECTION, SOLUTION INTRAVENOUS; SUBCUTANEOUS at 12:39

## 2019-01-13 RX ADMIN — FUROSEMIDE SCH MG: 10 INJECTION, SOLUTION INTRAMUSCULAR; INTRAVENOUS at 00:20

## 2019-01-13 RX ADMIN — INSULIN LISPRO SCH UNITS: 100 INJECTION, SOLUTION INTRAVENOUS; SUBCUTANEOUS at 17:44

## 2019-01-13 RX ADMIN — ASPIRIN SCH MG: 81 TABLET ORAL at 08:57

## 2019-01-13 RX ADMIN — GABAPENTIN SCH MG: 300 CAPSULE ORAL at 21:08

## 2019-01-13 RX ADMIN — ATORVASTATIN CALCIUM SCH MG: 20 TABLET, FILM COATED ORAL at 08:56

## 2019-01-13 RX ADMIN — FUROSEMIDE SCH MG: 10 INJECTION, SOLUTION INTRAMUSCULAR; INTRAVENOUS at 04:00

## 2019-01-13 RX ADMIN — INSULIN DETEMIR SCH UNITS: 100 INJECTION, SOLUTION SUBCUTANEOUS at 21:08

## 2019-01-13 RX ADMIN — GABAPENTIN SCH MG: 300 CAPSULE ORAL at 17:44

## 2019-01-13 RX ADMIN — INSULIN LISPRO SCH UNITS: 100 INJECTION, SOLUTION INTRAVENOUS; SUBCUTANEOUS at 21:08

## 2019-01-13 RX ADMIN — FEBUXOSTAT SCH MG: 40 TABLET, COATED ORAL at 08:56

## 2019-01-13 RX ADMIN — Medication SCH UNITS: at 08:57

## 2019-01-13 RX ADMIN — HYDRALAZINE HYDROCHLORIDE SCH MG: 10 TABLET, FILM COATED ORAL at 21:08

## 2019-01-13 RX ADMIN — GABAPENTIN SCH MG: 300 CAPSULE ORAL at 08:57

## 2019-01-13 RX ADMIN — INSULIN DETEMIR SCH UNITS: 100 INJECTION, SOLUTION SUBCUTANEOUS at 08:56

## 2019-01-13 RX ADMIN — ENOXAPARIN SODIUM SCH MG: 40 INJECTION SUBCUTANEOUS at 08:54

## 2019-01-13 RX ADMIN — FUROSEMIDE SCH MG: 10 INJECTION, SOLUTION INTRAMUSCULAR; INTRAVENOUS at 08:53

## 2019-01-13 RX ADMIN — SODIUM CHLORIDE SCH UNITS: 4.5 INJECTION, SOLUTION INTRAVENOUS at 14:00

## 2019-01-13 RX ADMIN — FUROSEMIDE SCH MG: 10 INJECTION, SOLUTION INTRAMUSCULAR; INTRAVENOUS at 17:45

## 2019-01-13 RX ADMIN — ENOXAPARIN SODIUM SCH MG: 40 INJECTION SUBCUTANEOUS at 08:58

## 2019-01-13 RX ADMIN — HYDRALAZINE HYDROCHLORIDE SCH MG: 10 TABLET, FILM COATED ORAL at 17:44

## 2019-01-13 RX ADMIN — SODIUM CHLORIDE SCH UNITS: 4.5 INJECTION, SOLUTION INTRAVENOUS at 21:09

## 2019-01-13 RX ADMIN — INSULIN LISPRO SCH UNITS: 100 INJECTION, SOLUTION INTRAVENOUS; SUBCUTANEOUS at 08:55

## 2019-01-13 RX ADMIN — FUROSEMIDE SCH MG: 10 INJECTION, SOLUTION INTRAMUSCULAR; INTRAVENOUS at 12:00

## 2019-01-13 RX ADMIN — HYDRALAZINE HYDROCHLORIDE SCH MG: 10 TABLET, FILM COATED ORAL at 08:57

## 2019-01-13 NOTE — IPNPDOC
Date Seen


The patient was seen on 1/13/19.





Progress Note


SUBJECTIVE: Patient tells me that he is feeling significantly better he 

hascomplaints at this time





OBJECTIVE


PHYSICAL EXAMINATION:


VITAL SIGNS: Please see below. 


GENERAL: Morbidly obese  man lying in sleeping on his left side easily 

arousable to verbal stimuli she does not appear to be in any acute distress


HEENT: Cranial nerves II through XII appear grossly intact difficult to assess 

for any elevation and CVP secondary to body habitus


CARDIOVASCULAR: S1-S2 regular no additional heart sounds are appreciated. 

Distant secondary to body habitus


RESPIRATORY: Diminished breath sounds at the bases bilaterally distant secondary

to body habitus.


ABDOMINAL: Grossly obese bowel sounds are present abdomen soft


EXTREMITIES: 2-3+ edema bilaterally with hyperpigmentation of the bilateral 

lower extremities





LABORATORY DATA, IMAGING STUDIES, MICROBIOLOGY: Please see below.





Echocardiogram: Ordered.





DVT prophylaxis ordered?: Lovenox





ASSESSMENT AND PLAN: This is a 63-year-old man  with decompensated congestive 

heart failure.





PROBLEMS:


1. Decompensated congestive heart failure: Please note at the time of this 

dictation there is no H&P available for review other than the diagnosis and no 

further sign out details were provided.. The patient does appear to be in 

decompensated congestive heart failure I do diuresis however 80 mg of IV every 4

hours quite aggressive he appears to be responding with greater than 4 L output 

with significantly less dosing. I'll transition him to 40 IV twice a day and 

order an echocardiogram. His previous one did demonstrate some diastolic 

dysfunction however I suspect he likely has underlying sleep apnea and may have 

an element of cor pulmonale as well. We'll attempt to wean his O2 and have him 

ambulate without O2 at his baseline





2. Suspected sleep apnea: Consider outpatient sleep study.





3. Hypertension: Continue with hydralazine. Continue carvedilol





4. Diabetes: Continue with insulin sliding scale and hypoglycemic protocol





5. BPH: Continue with Flomax





6. Gout: Continue with Uloric





7. Dyslipidemia: Continue with Lipitor





8. Vitamin D deficiency: Continue supplementation





DISPOSITION: Pending clinical improvement and echo.





VS, I&O, 24H, Fishbone


Vital Signs/I&O





Vital Signs








  Date Time  Temp Pulse Resp B/P (MAP) Pulse Ox O2 Delivery O2 Flow Rate FiO2


 


1/13/19 08:57  83  142/70    


 


1/13/19 08:00 98.1  20  93 Nasal Cannula 3.0 














I&O- Last 24 Hours up to 6 AM 


 


 1/13/19





 06:00


 


Intake Total 720 ml


 


Output Total 5075 ml


 


Balance -4355 ml











Laboratory Data


24H LABS


Laboratory Tests 2


1/12/19 16:46: Bedside Glucose (Misc Panel) 334H


1/12/19 19:54: Bedside Glucose (Misc Panel) 319H


1/12/19 20:56: 


Total Creatine Kinase 61, Creatine Kinase MB 3.0, Creatine Kinase MB Relative 

Index 4.75H, Troponin I < 0.02


1/13/19 04:54: 


Total Creatine Kinase 55, Creatine Kinase MB 3.0, Creatine Kinase MB Relative 

Index 5.45H, Troponin I < 0.02, Nucleated Red Blood Cells % (auto) 0.0, Anion 

Gap 7L, Glomerular Filtration Rate 39.0L, Blood Urea Nitrogen 35H, Creatinine 

1.88H, Sodium Level 134L, Potassium Level 4.6, Chloride Level 97L, Carbon 

Dioxide Level 30, Calcium Level 8.5L


1/13/19 12:05: Bedside Glucose (Misc Panel) 296H


CBC/BMP


Laboratory Tests


1/13/19 04:54








Red Blood Count 4.60, Mean Corpuscular Volume 82.0, Mean Corpuscular Hemoglobin 

24.3 L, Mean Corpuscular Hemoglobin Concent 29.7 L, Red Cell Distribution Width 

15.9 H, Calcium Level 8.5 L, Total Creatine Kinase 55











CARLITOS GARDINER MD              Jan 13, 2019 12:52

## 2019-01-14 VITALS — DIASTOLIC BLOOD PRESSURE: 84 MMHG | SYSTOLIC BLOOD PRESSURE: 144 MMHG

## 2019-01-14 VITALS — SYSTOLIC BLOOD PRESSURE: 157 MMHG | DIASTOLIC BLOOD PRESSURE: 70 MMHG

## 2019-01-14 VITALS — SYSTOLIC BLOOD PRESSURE: 144 MMHG | DIASTOLIC BLOOD PRESSURE: 68 MMHG

## 2019-01-14 VITALS — SYSTOLIC BLOOD PRESSURE: 150 MMHG | DIASTOLIC BLOOD PRESSURE: 68 MMHG

## 2019-01-14 VITALS — DIASTOLIC BLOOD PRESSURE: 70 MMHG | SYSTOLIC BLOOD PRESSURE: 160 MMHG

## 2019-01-14 VITALS — DIASTOLIC BLOOD PRESSURE: 80 MMHG | SYSTOLIC BLOOD PRESSURE: 140 MMHG

## 2019-01-14 LAB
BUN SERPL-MCNC: 38 MG/DL (ref 7–18)
CALCIUM SERPL-MCNC: 8.2 MG/DL (ref 8.8–10.2)
CHLORIDE SERPL-SCNC: 97 MEQ/L (ref 98–107)
CO2 SERPL-SCNC: 32 MEQ/L (ref 21–32)
CREAT SERPL-MCNC: 2.02 MG/DL (ref 0.7–1.3)
GFR SERPL CREATININE-BSD FRML MDRD: 35.9 ML/MIN/{1.73_M2} (ref 49–?)
GLUCOSE SERPL-MCNC: 307 MG/DL (ref 70–100)
HCT VFR BLD AUTO: 37.2 % (ref 42–52)
HGB BLD-MCNC: 10.7 G/DL (ref 13.5–17.5)
MCH RBC QN AUTO: 24.3 PG (ref 27–33)
MCHC RBC AUTO-ENTMCNC: 28.8 G/DL (ref 32–36.5)
MCV RBC AUTO: 84.4 FL (ref 80–96)
NT-PRO BNP: 935 PG/ML (ref ?–125)
PLATELET # BLD AUTO: 152 10^3/UL (ref 150–450)
POTASSIUM SERPL-SCNC: 5.1 MEQ/L (ref 3.5–5.1)
RBC # BLD AUTO: 4.41 10^6/UL (ref 4.3–6.1)
SODIUM SERPL-SCNC: 136 MEQ/L (ref 136–145)
WBC # BLD AUTO: 7.6 10^3/UL (ref 4–10)

## 2019-01-14 RX ADMIN — SODIUM CHLORIDE SCH UNITS: 4.5 INJECTION, SOLUTION INTRAVENOUS at 20:16

## 2019-01-14 RX ADMIN — SODIUM CHLORIDE SCH UNITS: 4.5 INJECTION, SOLUTION INTRAVENOUS at 14:00

## 2019-01-14 RX ADMIN — HYDRALAZINE HYDROCHLORIDE SCH MG: 10 TABLET, FILM COATED ORAL at 20:16

## 2019-01-14 RX ADMIN — GABAPENTIN SCH MG: 300 CAPSULE ORAL at 15:18

## 2019-01-14 RX ADMIN — HYDRALAZINE HYDROCHLORIDE SCH MG: 10 TABLET, FILM COATED ORAL at 15:18

## 2019-01-14 RX ADMIN — INSULIN DETEMIR SCH UNITS: 100 INJECTION, SOLUTION SUBCUTANEOUS at 20:15

## 2019-01-14 RX ADMIN — INSULIN LISPRO SCH UNITS: 100 INJECTION, SOLUTION INTRAVENOUS; SUBCUTANEOUS at 17:19

## 2019-01-14 RX ADMIN — INSULIN LISPRO SCH UNITS: 100 INJECTION, SOLUTION INTRAVENOUS; SUBCUTANEOUS at 20:15

## 2019-01-14 RX ADMIN — FEBUXOSTAT SCH MG: 40 TABLET, COATED ORAL at 10:48

## 2019-01-14 RX ADMIN — SODIUM CHLORIDE, PRESERVATIVE FREE SCH ML: 5 INJECTION INTRAVENOUS at 20:16

## 2019-01-14 RX ADMIN — GABAPENTIN SCH MG: 300 CAPSULE ORAL at 09:01

## 2019-01-14 RX ADMIN — INSULIN LISPRO SCH UNITS: 100 INJECTION, SOLUTION INTRAVENOUS; SUBCUTANEOUS at 12:27

## 2019-01-14 RX ADMIN — FUROSEMIDE SCH MG: 10 INJECTION, SOLUTION INTRAMUSCULAR; INTRAVENOUS at 09:00

## 2019-01-14 RX ADMIN — TAMSULOSIN HYDROCHLORIDE SCH MG: 0.4 CAPSULE ORAL at 20:16

## 2019-01-14 RX ADMIN — INSULIN LISPRO SCH UNITS: 100 INJECTION, SOLUTION INTRAVENOUS; SUBCUTANEOUS at 09:03

## 2019-01-14 RX ADMIN — HYDRALAZINE HYDROCHLORIDE SCH MG: 10 TABLET, FILM COATED ORAL at 09:01

## 2019-01-14 RX ADMIN — GABAPENTIN SCH MG: 300 CAPSULE ORAL at 20:15

## 2019-01-14 RX ADMIN — Medication SCH UNITS: at 09:01

## 2019-01-14 RX ADMIN — ASPIRIN SCH MG: 81 TABLET ORAL at 09:01

## 2019-01-14 RX ADMIN — SODIUM CHLORIDE SCH UNITS: 4.5 INJECTION, SOLUTION INTRAVENOUS at 05:34

## 2019-01-14 RX ADMIN — FUROSEMIDE SCH MG: 10 INJECTION, SOLUTION INTRAMUSCULAR; INTRAVENOUS at 17:18

## 2019-01-14 RX ADMIN — ATORVASTATIN CALCIUM SCH MG: 20 TABLET, FILM COATED ORAL at 09:01

## 2019-01-14 RX ADMIN — INSULIN DETEMIR SCH UNITS: 100 INJECTION, SOLUTION SUBCUTANEOUS at 09:02

## 2019-01-14 NOTE — IPNPDOC
Date Seen


The patient was seen on 1/14/19.





Progress Note


SUBJECTIVE: Patient tells me that he is feeling better he has no complaints at 

this time





OBJECTIVE


PHYSICAL EXAMINATION:


VITAL SIGNS: Please see below. 


GENERAL: Morbidly obese  man lying in sleeping on his left side easily 

arousable to verbal stimuli he does not appear to be in any acute distress


HEENT: Cranial nerves II through XII appear grossly intact difficult to assess 

for any elevation and CVP secondary to body habitus


CARDIOVASCULAR: S1-S2 regular no additional heart sounds are appreciated. 

Distant secondary to body habitus


RESPIRATORY: Diminished breath sounds at the bases bilaterally distant secondary

to body habitus.


ABDOMINAL: Grossly obese bowel sounds are present abdomen soft


EXTREMITIES: 2+ edema bilaterally with hyperpigmentation of the bilateral lower 

extremities





LABORATORY DATA, IMAGING STUDIES, MICROBIOLOGY: Please see below.





Echocardiogram: pending





DVT prophylaxis ordered?: Sequentials the patient has refused pharmacological 

agents despite extensive counseling





ASSESSMENT AND PLAN: This is a 63-year-old man  with decompensated congestive 

heart failure.





PROBLEMS:


1. Decompensated congestive heart failure: The patient does appear to be in 

decompensated congestive heart failure but is improving continue with IV 

diuresis. Blood pressure is fairly well controlled could consider titrating up 

his hydralazine 





2. Suspected sleep apnea: Consider outpatient sleep study. At his request and 

did contact his primary care provider today to arrange this





3. Hypertension: Continue with hydralazine. Continue carvedilol





4. Diabetes: Continue with insulin sliding scale and hypoglycemic protocol, for 

his neuropathy continue with gabapentin





5. BPH: Continue with Flomax





6. Gout: Continue with Uloric, for chronic ulcer pain continue with tramadol





7. Dyslipidemia: Continue with Lipitor





8. Vitamin D deficiency: Continue supplementation





DISPOSITION: Pending clinical improvement





VS, I&O, 24H, Bob


Vital Signs/I&O





Vital Signs








  Date Time  Temp Pulse Resp B/P (MAP) Pulse Ox O2 Delivery O2 Flow Rate FiO2


 


1/14/19 11:48 98.3 78 18 144/68 (93) 93 Nasal Cannula 3.0 














I&O- Last 24 Hours up to 6 AM 


 


 1/14/19





 05:59


 


Intake Total 1630 ml


 


Output Total 3450 ml


 


Balance -1820 ml











Laboratory Data


24H LABS


Laboratory Tests 2


1/13/19 17:35: Bedside Glucose (Misc Panel) 336H


1/13/19 20:53: Bedside Glucose (Misc Panel) 326H


1/14/19 04:50: 


Nucleated Red Blood Cells % (auto) 0.0, Anion Gap 7L, Glomerular Filtration Rate

35.9L, Blood Urea Nitrogen 38H, Creatinine 2.02H, Sodium Level 136, Potassium 

Level 5.1, Chloride Level 97L, Carbon Dioxide Level 32, Calcium Level 8.2L, 

NT-Pro-B-Type Natriuretic Peptide 935H


1/14/19 12:15: Bedside Glucose (Misc Panel) 278H


CBC/BMP


Laboratory Tests


1/14/19 04:50








Red Blood Count 4.41, Mean Corpuscular Volume 84.4, Mean Corpuscular Hemoglobin 

24.3 L, Mean Corpuscular Hemoglobin Concent 28.8 L, Red Cell Distribution Width 

15.7 H, Calcium Level 8.2 L











CARLITOS GARDINER MD              Jan 14, 2019 13:19

## 2019-01-14 NOTE — ECHO
DATE OF PROCEDURE:  01/14/2019

 

YOB: 1955

Age:  63

Gender:  Male

Height:  72 inches

Weight:  385 pounds

Body surface area:  2.82 meters squared

Inpatient: [facility], room 3213

 

REFERRING PHYSICIAN: Dr. Coreen Small

INDICATION:  Congestive heart failure (CHF).

 

MEASUREMENTS:

2D measurements:

RV:  5.5 cm

LV:  5.8 cm

Septum:  1.6 cm

Posterior wall:  1.3 cm

Aortic root:  3.2 cm

LA:  4.2 cm

LVEF:  45%

 

Doppler measurements:

AV:  1.05 meters per second

LVOT:  0.87 meters per second

LVOT diameter:  2.0 cm

MV-E:  93,  A:  78,  EA ratio:  1.2

Early mitral deceleration time:  151 ms

E prime:  6.6,  A prime:  8.7,  E/E prime ratio:  14

Pulmonary capillary wedge pressure:  17 mmHg

PV:  0.75 meters per second

Pulmonary artery acceleration time:  91 milliseconds

RVSP:  45-50 mmHg

IVC:  3.2 cm

 

COMMENTS:

Normal sinus rhythm without intraventricular conduction disturbance.

 

Technically challenging study in light of the patient's body habitus but

diagnostically useful information was still obtained.

 

M-mode and two-dimensional echocardiography was performed with pulsed, continuous

wave, color flow, and tissue Doppler studies.

 

Mildly dilated and hypertrophied left ventricle with proximal inferior and

inferoseptal akinesis in keeping with prior IWMI. Septal wall motion abnormality,

likely related to a degree of RV pressure overload.  At least mildly impaired

global resting systolic function.

 

At least mildly dilated left atrium with impairment of LV diastolic function and

current estimated mean left atrial pressure upper limits of normal to mildly

increased.

 

Moderately dilated right heart chambers with right ventricular hypokinesis and

moderate pulmonary hypertension.

 

Moderately dilated IVC with reduced respiratory collapse in keeping with elevated

central venous pressure.

 

Aortic valvular sclerosis without functional abnormality.

 

Normal aortic root size.

 

Normal appearing and functioning mitral valvular apparatus.

 

No apparent pericardial effusion.

 

No apparent intracardiac mass.

 

Comparing today's study with 10/15/2018, there did not appear to be a significant

change.

## 2019-01-14 NOTE — HPE
DATE OF ADMISSION:  2019

 

PRIMARY CARE PHYSICIAN:  Dr. Tyshawn Daigle - [facility]

 

NEPHROLOGIST:  Dr. Donato Coley

 

CHIEF COMPLAINT:  Shortness of breath.

 

HISTORY:  Dejuan Knowles is admitted again with shortness of breath.  He was

recently admitted 10/15/2018 for the same.  He has a history of both left and

right-sided congestive heart failure.  He has noted increasing lower extremity

edema with orthopnea, dyspnea on exertion and tasia dyspnea at rest progressing

over the last week.  He claims no increase in salt or fluid intake to account for

this.  He has known ischemic cardiomyopathy.  He had an echocardiogram 10/2018,

ejection fraction of 45%, left atrial enlargement, dilated right ventricle also

noted with pulmonary hypertension.  He had clearcut areas of akinesis and

hypokinesis on echocardiogram suggesting areas of apparently silent myocardial

infarction.  Other past history shows chronic kidney disease stage III which is

now approaching stage IV, hypertensive heart disease, morbid obesity, obstructive

sleep apnea (MELISA), chronic obstructive pulmonary disease (COPD), type 2 diabetes

with diabetic nephropathy and diabetic foot ulcers with cellulitis and

osteomyelitis followed by Dr. Saravia.  He has a history of gout, BPH with

urinary retention, anemia of chronic disease from chronic kidney disease.

 

MEDICATIONS:

- Uloric 40 mg daily

- calcitriol 0.25 mcg daily

- vitamin D 1000 units daily

- gabapentin 300 mg three times a day

- atorvastatin 40 mg daily

- hydralazine 10 mg three times a day

- carvedilol 3.125 mg twice a day

- aspirin 81 mg daily

- tamsulosin 0.4 mg daily

- Levemir 10 units twice a day

- magnesium oxide 400 mg daily

- torsemide 20 mg twice a day

- tramadol 50 mg every 8 hours as needed

 

ALLERGIES:  METFORMIN due to chronic kidney disease.

 

FAMILY HISTORY:  Father  of colon cancer.  Mother  of a heart attack.

 

SURGICAL HISTORY:  Incision and drainage (I and D) right foot 2016,

percutaneous coronary intervention (PCI) of unspecified coronary artery vessel in

, appendectomy, thyroidectomy, left testicular resection of unknown

indication, multiple surgeries on the right foot, most recently 2017.

 

SOCIAL HISTORY:  Quit smoking about a year ago.  No alcohol intake.  He is

, no real exercise program.

 

REVIEW OF SYSTEMS:  No fever or chills.  No sputum production or hemoptysis.

Denies chest pain.  He does have orthopnea, paroxysmal nocturnal dyspnea (PND),

and increasing edema.  Claims compliance with his medicines.

 

PHYSICAL EXAMINATION:

VITAL SIGNS:  Per flow sheet.

GENERAL APPEARANCE:  Chronically ill-appearing, morbidly obese, sitting up in

bed.

HEENT:  Shows a thick neck, narrow airway.

LUNGS:  Decreased breath sounds, rales at both bases, expiratory wheezes noted.

HEART:  Regular rate, rhythm, no murmur.

ABDOMEN:  Soft, nontender, no masses.

EXTREMITIES:  2+ peripheral edema with brawny venous stasis dermatitis

bilaterally.  Normal strength in the arms and legs.

 

IMPRESSION:

1.  Acute on chronic congestive heart failure with decreased ejection fraction of

45% as well as suspected acute on chronic cor pulmonale.  Plan will be to admit

to progressive care unit (PCU) bed.  IV Lasix ordered to initiate his diuresis

with goal diuresis of 1200 mL/day.  Daily labs have been ordered.  Last echo was

10/2018, so this will not be repeated.  Continue his current vasodilator regimen

with hydralazine 10 mg twice a day as well as beta blocker therapy with

carvedilol 0.125 mg twice a day.

 

2.  Diabetes.  Continue his basal insulin with sliding scale coverage based upon

sliding scale.  Reduce basal insulin dose on admission due to enforced compliance

with diabetic diet.

 

3.  Diabetic neuropathy.  Continue his gabapentin at 300 mg three times a day.

 

4.  Hyperlipidemia.  Continue atorvastatin 40 mg daily.

 

5.  Chronic foot ulcer.  Per podiatry.  Refill tramadol for pain relief.

 

Dr. Small will be assuming his care in the morning.

## 2019-01-15 VITALS — SYSTOLIC BLOOD PRESSURE: 140 MMHG | DIASTOLIC BLOOD PRESSURE: 78 MMHG

## 2019-01-15 VITALS — DIASTOLIC BLOOD PRESSURE: 86 MMHG | SYSTOLIC BLOOD PRESSURE: 152 MMHG

## 2019-01-15 VITALS — SYSTOLIC BLOOD PRESSURE: 142 MMHG | DIASTOLIC BLOOD PRESSURE: 80 MMHG

## 2019-01-15 VITALS — SYSTOLIC BLOOD PRESSURE: 148 MMHG | DIASTOLIC BLOOD PRESSURE: 78 MMHG

## 2019-01-15 VITALS — SYSTOLIC BLOOD PRESSURE: 140 MMHG | DIASTOLIC BLOOD PRESSURE: 72 MMHG

## 2019-01-15 VITALS — DIASTOLIC BLOOD PRESSURE: 72 MMHG | SYSTOLIC BLOOD PRESSURE: 120 MMHG

## 2019-01-15 LAB
BUN SERPL-MCNC: 45 MG/DL (ref 7–18)
CALCIUM SERPL-MCNC: 8.4 MG/DL (ref 8.8–10.2)
CHLORIDE SERPL-SCNC: 96 MEQ/L (ref 98–107)
CO2 SERPL-SCNC: 33 MEQ/L (ref 21–32)
CREAT SERPL-MCNC: 2.08 MG/DL (ref 0.7–1.3)
GFR SERPL CREATININE-BSD FRML MDRD: 34.7 ML/MIN/{1.73_M2} (ref 49–?)
GLUCOSE SERPL-MCNC: 282 MG/DL (ref 70–100)
HCT VFR BLD AUTO: 37.6 % (ref 42–52)
HGB BLD-MCNC: 11 G/DL (ref 13.5–17.5)
MCH RBC QN AUTO: 24.4 PG (ref 27–33)
MCHC RBC AUTO-ENTMCNC: 29.3 G/DL (ref 32–36.5)
MCV RBC AUTO: 83.6 FL (ref 80–96)
PLATELET # BLD AUTO: 167 10^3/UL (ref 150–450)
POTASSIUM SERPL-SCNC: 4.7 MEQ/L (ref 3.5–5.1)
RBC # BLD AUTO: 4.5 10^6/UL (ref 4.3–6.1)
SODIUM SERPL-SCNC: 134 MEQ/L (ref 136–145)
WBC # BLD AUTO: 7.7 10^3/UL (ref 4–10)

## 2019-01-15 RX ADMIN — SODIUM CHLORIDE SCH UNITS: 4.5 INJECTION, SOLUTION INTRAVENOUS at 05:26

## 2019-01-15 RX ADMIN — ATORVASTATIN CALCIUM SCH MG: 20 TABLET, FILM COATED ORAL at 09:59

## 2019-01-15 RX ADMIN — GABAPENTIN SCH MG: 300 CAPSULE ORAL at 10:03

## 2019-01-15 RX ADMIN — INSULIN LISPRO SCH UNITS: 100 INJECTION, SOLUTION INTRAVENOUS; SUBCUTANEOUS at 12:10

## 2019-01-15 RX ADMIN — INSULIN LISPRO SCH UNITS: 100 INJECTION, SOLUTION INTRAVENOUS; SUBCUTANEOUS at 08:01

## 2019-01-15 RX ADMIN — Medication SCH UNITS: at 10:04

## 2019-01-15 RX ADMIN — INSULIN LISPRO SCH UNITS: 100 INJECTION, SOLUTION INTRAVENOUS; SUBCUTANEOUS at 20:53

## 2019-01-15 RX ADMIN — NYSTATIN SCH DOSE: 100000 POWDER TOPICAL at 20:57

## 2019-01-15 RX ADMIN — INSULIN DETEMIR SCH UNITS: 100 INJECTION, SOLUTION SUBCUTANEOUS at 20:52

## 2019-01-15 RX ADMIN — GABAPENTIN SCH MG: 300 CAPSULE ORAL at 16:51

## 2019-01-15 RX ADMIN — ASPIRIN SCH MG: 81 TABLET ORAL at 09:59

## 2019-01-15 RX ADMIN — SODIUM CHLORIDE, PRESERVATIVE FREE PRN ML: 5 INJECTION INTRAVENOUS at 20:13

## 2019-01-15 RX ADMIN — SODIUM CHLORIDE, PRESERVATIVE FREE SCH ML: 5 INJECTION INTRAVENOUS at 16:52

## 2019-01-15 RX ADMIN — TAMSULOSIN HYDROCHLORIDE SCH MG: 0.4 CAPSULE ORAL at 20:52

## 2019-01-15 RX ADMIN — HYDRALAZINE HYDROCHLORIDE SCH MG: 10 TABLET, FILM COATED ORAL at 16:51

## 2019-01-15 RX ADMIN — SODIUM CHLORIDE SCH UNITS: 4.5 INJECTION, SOLUTION INTRAVENOUS at 14:00

## 2019-01-15 RX ADMIN — FEBUXOSTAT SCH MG: 40 TABLET, COATED ORAL at 10:05

## 2019-01-15 RX ADMIN — INSULIN LISPRO SCH UNITS: 100 INJECTION, SOLUTION INTRAVENOUS; SUBCUTANEOUS at 17:33

## 2019-01-15 RX ADMIN — SODIUM CHLORIDE, PRESERVATIVE FREE SCH ML: 5 INJECTION INTRAVENOUS at 20:13

## 2019-01-15 RX ADMIN — FUROSEMIDE SCH MG: 10 INJECTION, SOLUTION INTRAMUSCULAR; INTRAVENOUS at 09:58

## 2019-01-15 RX ADMIN — SODIUM CHLORIDE SCH UNITS: 4.5 INJECTION, SOLUTION INTRAVENOUS at 22:00

## 2019-01-15 RX ADMIN — SODIUM CHLORIDE, PRESERVATIVE FREE SCH ML: 5 INJECTION INTRAVENOUS at 05:26

## 2019-01-15 RX ADMIN — INSULIN DETEMIR SCH UNITS: 100 INJECTION, SOLUTION SUBCUTANEOUS at 09:58

## 2019-01-15 RX ADMIN — HYDRALAZINE HYDROCHLORIDE SCH MG: 10 TABLET, FILM COATED ORAL at 20:52

## 2019-01-15 RX ADMIN — GABAPENTIN SCH MG: 300 CAPSULE ORAL at 20:51

## 2019-01-15 RX ADMIN — HYDRALAZINE HYDROCHLORIDE SCH MG: 10 TABLET, FILM COATED ORAL at 10:03

## 2019-01-15 NOTE — IPNPDOC
Text Note


Date of Service


The patient was seen on 1/15/19.





NOTE


Subjective:


Patient was seen and examined at the bedside. Patient was that the breathing is 

doing significantly better. They deny any chest pain or palpitations. Denied 

nausea, vomiting, abdominal pain, constipation, diarrhea or discomfort with 

urination. I have advised him that they will be working with physical therapy 

today.





Objective:


Vitals (See below)


General: Lying in bed, no acute distress, comfortable, AAOx3


HEENT: NC, AT


CVS: RRR, +S1S2


Lungs: Fair air entry b/l, -w/r/r


Abdomen: Soft, ND, NT, Morbid obesity


Extremities: 1+ pitting edema b/l, chronic venous stasis skin changes, - Calf 

tenderness





Assessment and plan:


Decompensated Systolic (EF 45%) and Diastolic CHF 


- Presented with SOB; has had improvement


- Will continue to titrate down supplemental oxygen


- Physical with improvement in fluid overload signs


- BNP still elevated


- CXR 1/12: CHF pattern with vascular congestion and left pleural effusion.  

Cardiomegaly.


- ECHO 1/14: impaired systolic / diastolic function 


- c/w Diuresis 





Suspected MELISA


- Will get nocturnal pulse oximetry study


- Will likely qualify for nocturnal oxygen


- Will need outpatient sleep study; has been discussed by my colleague, Dr. Pat and PCP





HTN


- Blood pressure well controlled


- c/w Carvedilol, Hydralazine, Furosemide





IDDM2


- c/w ISS and Levemir 





DLP


- c/w Atorvastatin 





Neuropathy


- c/w Gabapentin 





BPH


- c/w Tamsulosin





Gout


- c/w Febuxostat





Chronic ulcers / pain 


- c/w Tramadol 





Vitamin D deficiency


- c/ w Vitamin D supplementation 





DVT prophylaxis


- c/w SCDs; refused pharmacological agents (Aware of risks/benefits)





Disposition:


- Will start physical therapy


- Nocturnal pulse oximetry study


- Repeat CXR in AM


- c/w Diuresis





VS,Fishbone, I+O


VS, Fishbone, I+O


Laboratory Tests


1/15/19 04:38








Red Blood Count 4.50, Mean Corpuscular Volume 83.6, Mean Corpuscular Hemoglobin 

24.4 L, Mean Corpuscular Hemoglobin Concent 29.3 L, Red Cell Distribution Width 

15.6 H, Calcium Level 8.4 L








Vital Signs








  Date Time  Temp Pulse Resp B/P (MAP) Pulse Ox O2 Delivery O2 Flow Rate FiO2


 


1/15/19 12:00       2.0 


 


1/15/19 11:48 97.8 74 20 120/72 (88) 98 Nasal Cannula  














I&O- Last 24 Hours up to 6 AM 


 


 1/15/19





 05:59


 


Intake Total 1560 ml


 


Output Total 2750 ml


 


Balance -1190 ml

















NEO BLANCO MD                Omega 15, 2019 14:40

## 2019-01-16 VITALS — SYSTOLIC BLOOD PRESSURE: 122 MMHG | DIASTOLIC BLOOD PRESSURE: 76 MMHG

## 2019-01-16 VITALS — SYSTOLIC BLOOD PRESSURE: 138 MMHG | DIASTOLIC BLOOD PRESSURE: 74 MMHG

## 2019-01-16 VITALS — DIASTOLIC BLOOD PRESSURE: 74 MMHG | SYSTOLIC BLOOD PRESSURE: 152 MMHG

## 2019-01-16 VITALS — DIASTOLIC BLOOD PRESSURE: 70 MMHG | SYSTOLIC BLOOD PRESSURE: 140 MMHG

## 2019-01-16 VITALS — DIASTOLIC BLOOD PRESSURE: 74 MMHG | SYSTOLIC BLOOD PRESSURE: 134 MMHG

## 2019-01-16 VITALS — DIASTOLIC BLOOD PRESSURE: 78 MMHG | SYSTOLIC BLOOD PRESSURE: 132 MMHG

## 2019-01-16 LAB
BUN SERPL-MCNC: 47 MG/DL (ref 7–18)
CALCIUM SERPL-MCNC: 8.4 MG/DL (ref 8.8–10.2)
CHLORIDE SERPL-SCNC: 97 MEQ/L (ref 98–107)
CO2 SERPL-SCNC: 33 MEQ/L (ref 21–32)
CREAT SERPL-MCNC: 1.9 MG/DL (ref 0.7–1.3)
GFR SERPL CREATININE-BSD FRML MDRD: 38.6 ML/MIN/{1.73_M2} (ref 49–?)
GLUCOSE SERPL-MCNC: 211 MG/DL (ref 70–100)
HCT VFR BLD AUTO: 36.7 % (ref 42–52)
HGB BLD-MCNC: 10.9 G/DL (ref 13.5–17.5)
MCH RBC QN AUTO: 24.3 PG (ref 27–33)
MCHC RBC AUTO-ENTMCNC: 29.7 G/DL (ref 32–36.5)
MCV RBC AUTO: 81.9 FL (ref 80–96)
PLATELET # BLD AUTO: 181 10^3/UL (ref 150–450)
POTASSIUM SERPL-SCNC: 4.6 MEQ/L (ref 3.5–5.1)
RBC # BLD AUTO: 4.48 10^6/UL (ref 4.3–6.1)
SODIUM SERPL-SCNC: 135 MEQ/L (ref 136–145)
WBC # BLD AUTO: 7.4 10^3/UL (ref 4–10)

## 2019-01-16 RX ADMIN — SODIUM CHLORIDE, PRESERVATIVE FREE SCH ML: 5 INJECTION INTRAVENOUS at 20:48

## 2019-01-16 RX ADMIN — SODIUM CHLORIDE SCH UNITS: 4.5 INJECTION, SOLUTION INTRAVENOUS at 13:06

## 2019-01-16 RX ADMIN — INSULIN LISPRO SCH UNITS: 100 INJECTION, SOLUTION INTRAVENOUS; SUBCUTANEOUS at 20:47

## 2019-01-16 RX ADMIN — INSULIN LISPRO SCH UNITS: 100 INJECTION, SOLUTION INTRAVENOUS; SUBCUTANEOUS at 18:39

## 2019-01-16 RX ADMIN — GABAPENTIN SCH MG: 300 CAPSULE ORAL at 16:26

## 2019-01-16 RX ADMIN — INSULIN LISPRO SCH UNITS: 100 INJECTION, SOLUTION INTRAVENOUS; SUBCUTANEOUS at 10:05

## 2019-01-16 RX ADMIN — SODIUM CHLORIDE SCH UNITS: 4.5 INJECTION, SOLUTION INTRAVENOUS at 05:03

## 2019-01-16 RX ADMIN — Medication SCH UNITS: at 10:07

## 2019-01-16 RX ADMIN — GABAPENTIN SCH MG: 300 CAPSULE ORAL at 20:46

## 2019-01-16 RX ADMIN — INSULIN LISPRO SCH UNITS: 100 INJECTION, SOLUTION INTRAVENOUS; SUBCUTANEOUS at 13:06

## 2019-01-16 RX ADMIN — SODIUM CHLORIDE, PRESERVATIVE FREE PRN ML: 5 INJECTION INTRAVENOUS at 05:02

## 2019-01-16 RX ADMIN — GABAPENTIN SCH MG: 300 CAPSULE ORAL at 10:07

## 2019-01-16 RX ADMIN — TAMSULOSIN HYDROCHLORIDE SCH MG: 0.4 CAPSULE ORAL at 20:46

## 2019-01-16 RX ADMIN — HYDRALAZINE HYDROCHLORIDE SCH MG: 10 TABLET, FILM COATED ORAL at 16:26

## 2019-01-16 RX ADMIN — NYSTATIN SCH DOSE: 100000 POWDER TOPICAL at 10:11

## 2019-01-16 RX ADMIN — HYDRALAZINE HYDROCHLORIDE SCH MG: 10 TABLET, FILM COATED ORAL at 20:46

## 2019-01-16 RX ADMIN — ATORVASTATIN CALCIUM SCH MG: 20 TABLET, FILM COATED ORAL at 10:06

## 2019-01-16 RX ADMIN — SODIUM CHLORIDE, PRESERVATIVE FREE SCH ML: 5 INJECTION INTRAVENOUS at 13:06

## 2019-01-16 RX ADMIN — FEBUXOSTAT SCH MG: 40 TABLET, COATED ORAL at 10:06

## 2019-01-16 RX ADMIN — INSULIN DETEMIR SCH UNITS: 100 INJECTION, SOLUTION SUBCUTANEOUS at 20:47

## 2019-01-16 RX ADMIN — ASPIRIN SCH MG: 81 TABLET ORAL at 10:08

## 2019-01-16 RX ADMIN — TORSEMIDE SCH MG: 20 TABLET ORAL at 16:26

## 2019-01-16 RX ADMIN — TORSEMIDE SCH MG: 20 TABLET ORAL at 10:10

## 2019-01-16 RX ADMIN — SODIUM CHLORIDE, PRESERVATIVE FREE SCH ML: 5 INJECTION INTRAVENOUS at 05:02

## 2019-01-16 RX ADMIN — SODIUM CHLORIDE SCH UNITS: 4.5 INJECTION, SOLUTION INTRAVENOUS at 21:06

## 2019-01-16 RX ADMIN — HYDRALAZINE HYDROCHLORIDE SCH MG: 10 TABLET, FILM COATED ORAL at 10:11

## 2019-01-16 RX ADMIN — NYSTATIN SCH DOSE: 100000 POWDER TOPICAL at 20:47

## 2019-01-16 RX ADMIN — INSULIN DETEMIR SCH UNITS: 100 INJECTION, SOLUTION SUBCUTANEOUS at 10:06

## 2019-01-16 NOTE — IPNPDOC
Text Note


Date of Service


The patient was seen on 1/16/19.





NOTE


Subjective:


Patient was seen and examined at the bedside. Patient notes that her breathing 

is doing slightly better. Denies any nausea, vomiting, abdominal pain, c

onstipation, diarrhea. Denies any discomfort with urination. They do report that

they've been urinating excessively with the diuretics. 





Objective:


Vitals (See below)


General: Lying in bed, no acute distress, comfortable, AAOx3


HEENT: NC, AT


CVS: RRR, +S1S2


Lungs: Fair air entry b/l, auscultation is without any wheezing, rales or 

rhonchi


Abdomen: Soft, ND, NT, Morbid obesity


Extremities: trace pitting edema b/l, chronic venous stasis skin changes, - Calf

tenderness





Assessment and plan:


Decompensated Systolic (EF 45%) and Diastolic CHF 


- Presented with SOB; has had improvement


- Will continue to titrate down supplemental oxygen


- Physical with improvement in fluid overload signs


- BNP still elevated; will repeat BNP tomorrow AM


- CXR 1/12: CHF pattern with vascular congestion and left pleural effusion.  

Cardiomegaly.


- CXR 1/16: Patient is rotated somewhat to the left.  There is pleural opacity 

in the left base again noted.  Slight fissural thickening is noted on the right.

Vascular and interstitial markings remain somewhat congested.  Cardiomegaly is 

observed.  No new infiltrate.


- ECHO 1/14: impaired systolic / diastolic function 


- c/w Diuresis; Will change to PO diuretics today 





Suspected MELISA


- s/p nocturnal pulse oximetry study; report pending - however will likely 

qualify for nocturnal oxygen


- Will need outpatient sleep study; has been discussed by my colleague, Dr. Pat and PCP





HTN


- Blood pressure well controlled


- c/w Carvedilol, Hydralazine, Furosemide





IDDM2


- c/w ISS and Levemir 





DLP


- c/w Atorvastatin 





Neuropathy


- c/w Gabapentin 





BPH


- c/w Tamsulosin





Gout


- c/w Febuxostat





Chronic ulcers / pain 


- c/w Tramadol 





Vitamin D deficiency


- c/ w Vitamin D supplementation 





DVT prophylaxis


- c/w SCDs; refused pharmacological agents (Aware of risks/benefits)





Disposition:


- c/w physical therapy


- c/w Diuresis





VS,Fishbone, I+O


VS, Fishbone, I+O


Laboratory Tests


1/16/19 05:27








Red Blood Count 4.48, Mean Corpuscular Volume 81.9, Mean Corpuscular Hemoglobin 

24.3 L, Mean Corpuscular Hemoglobin Concent 29.7 L, Red Cell Distribution Width 

15.5 H, Calcium Level 8.4 L








Vital Signs








  Date Time  Temp Pulse Resp B/P (MAP) Pulse Ox O2 Delivery O2 Flow Rate FiO2


 


1/16/19 11:42 98.4 73 20 134/74 (94) 94 Nasal Cannula 2.0 














I&O- Last 24 Hours up to 6 AM 


 


 1/16/19





 06:00


 


Intake Total 1200 ml


 


Output Total 2500 ml


 


Balance -1300 ml

















NEO BLANCO MD                Jan 16, 2019 13:50

## 2019-01-16 NOTE — REP
Portable chest x-ray:  Single view.

 

History:  Shortness of breath.

 

Comparison study:  January 12, 2019.

 

Findings:  Patient is rotated somewhat to the left.  There is pleural opacity in

the left base again noted.  Slight fissural thickening is noted on the right.

Vascular and interstitial markings remain somewhat congested.  Cardiomegaly is

observed.  No new infiltrate.

 

 

Electronically Signed by

Gulshan Wellington MD 01/16/2019 11:44 A

## 2019-01-17 VITALS — SYSTOLIC BLOOD PRESSURE: 128 MMHG | DIASTOLIC BLOOD PRESSURE: 70 MMHG

## 2019-01-17 VITALS — SYSTOLIC BLOOD PRESSURE: 146 MMHG | DIASTOLIC BLOOD PRESSURE: 60 MMHG

## 2019-01-17 VITALS — DIASTOLIC BLOOD PRESSURE: 60 MMHG | SYSTOLIC BLOOD PRESSURE: 152 MMHG

## 2019-01-17 VITALS — DIASTOLIC BLOOD PRESSURE: 78 MMHG | SYSTOLIC BLOOD PRESSURE: 138 MMHG

## 2019-01-17 LAB
BUN SERPL-MCNC: 48 MG/DL (ref 7–18)
CALCIUM SERPL-MCNC: 8.4 MG/DL (ref 8.8–10.2)
CHLORIDE SERPL-SCNC: 95 MEQ/L (ref 98–107)
CO2 SERPL-SCNC: 33 MEQ/L (ref 21–32)
CREAT SERPL-MCNC: 2.03 MG/DL (ref 0.7–1.3)
GFR SERPL CREATININE-BSD FRML MDRD: 35.7 ML/MIN/{1.73_M2} (ref 49–?)
GLUCOSE SERPL-MCNC: 258 MG/DL (ref 70–100)
HCT VFR BLD AUTO: 38.4 % (ref 42–52)
HGB BLD-MCNC: 11.1 G/DL (ref 13.5–17.5)
MCH RBC QN AUTO: 24.2 PG (ref 27–33)
MCHC RBC AUTO-ENTMCNC: 28.9 G/DL (ref 32–36.5)
MCV RBC AUTO: 83.8 FL (ref 80–96)
NT-PRO BNP: 1001 PG/ML (ref ?–125)
PLATELET # BLD AUTO: 169 10^3/UL (ref 150–450)
POTASSIUM SERPL-SCNC: 4.7 MEQ/L (ref 3.5–5.1)
RBC # BLD AUTO: 4.58 10^6/UL (ref 4.3–6.1)
SODIUM SERPL-SCNC: 134 MEQ/L (ref 136–145)
WBC # BLD AUTO: 6.7 10^3/UL (ref 4–10)

## 2019-01-17 RX ADMIN — GABAPENTIN SCH MG: 300 CAPSULE ORAL at 09:10

## 2019-01-17 RX ADMIN — INSULIN LISPRO SCH UNITS: 100 INJECTION, SOLUTION INTRAVENOUS; SUBCUTANEOUS at 07:54

## 2019-01-17 RX ADMIN — ASPIRIN SCH MG: 81 TABLET ORAL at 09:10

## 2019-01-17 RX ADMIN — NYSTATIN SCH DOSE: 100000 POWDER TOPICAL at 09:11

## 2019-01-17 RX ADMIN — ATORVASTATIN CALCIUM SCH MG: 20 TABLET, FILM COATED ORAL at 09:10

## 2019-01-17 RX ADMIN — INSULIN DETEMIR SCH UNITS: 100 INJECTION, SOLUTION SUBCUTANEOUS at 09:00

## 2019-01-17 RX ADMIN — TORSEMIDE SCH MG: 20 TABLET ORAL at 09:10

## 2019-01-17 RX ADMIN — Medication SCH UNITS: at 09:10

## 2019-01-17 RX ADMIN — SODIUM CHLORIDE SCH UNITS: 4.5 INJECTION, SOLUTION INTRAVENOUS at 05:15

## 2019-01-17 RX ADMIN — SODIUM CHLORIDE, PRESERVATIVE FREE SCH ML: 5 INJECTION INTRAVENOUS at 05:16

## 2019-01-17 RX ADMIN — SODIUM CHLORIDE SCH UNITS: 4.5 INJECTION, SOLUTION INTRAVENOUS at 13:21

## 2019-01-17 RX ADMIN — INSULIN LISPRO SCH UNITS: 100 INJECTION, SOLUTION INTRAVENOUS; SUBCUTANEOUS at 12:20

## 2019-01-17 RX ADMIN — FEBUXOSTAT SCH MG: 40 TABLET, COATED ORAL at 09:09

## 2019-01-17 RX ADMIN — HYDRALAZINE HYDROCHLORIDE SCH MG: 10 TABLET, FILM COATED ORAL at 09:10

## 2019-01-17 NOTE — DS.PDOC
Discharge Summary


General


Date of Admission


Jan 12, 2019 at 14:50


Date of Discharge


1/17/19





Discharge Summary


PROCEDURES PERFORMED DURING STAY: [None].





ADMITTING DIAGNOSES / DISCHARGE DIAGNOSES:


Decompensated Systolic (EF 45%) and Diastolic CHF 


Nocturnal Hypoxia - likely 2/2 suspected MELISA


HTN


IDDM2


DLP


Neuropathy


BPH


Gout


Chronic ulcers / pain 


Vitamin D deficiency


DVT prophylaxis





COMPLICATIONS/CHIEF COMPLAINT: 


Shortness of breath 





HISTORY OF PRESENT ILLNESS:


Patient is a 63-year-old male with a PMHx of Systolic (EF 45%) / Diastolic CHF, 

Suspected MELISA, HTN, IDDM2, DLP, Neuropathy, BPH, Gout, Chronic ulcers / pain, 

Vitamin D deficiency who presented to the ER with complaints of shortness of jose r

ath. . He is noted associated lower extremity swelling as well as shortness of 

breath with exertion and positional shortness of breath when lying flat. Patient

 was found to be in fluid overload and was admitted to hospitalist service for 

further evaluation and treatment.





HOSPITAL COURSE:


Decompensated Systolic (EF 45%) and Diastolic CHF 


- Initially presented for shortness of breath which has resolved


- During the day. Patient does not require any oxygen


- Physical with improvement in fluid overload signs


- BNP elevated on admission 


- CXR 1/12: CHF pattern with vascular congestion and left pleural effusion.  

Cardiomegaly.


- CXR 1/16: Patient is rotated somewhat to the left.  There is pleural opacity 

in the left base again noted.  Slight fissural thickening is noted on the right.

 Vascular and interstitial markings remain somewhat congested.  Cardiomegaly is 

observed.  No new infiltrate.


- ECHO 1/14: impaired systolic / diastolic function 


- c/w PO torsemide; will continue with current dose of oral torsemide. Upon 

discharge


- We'll have outpatient follow-up on Monday 1/21/19 for evaluation of current 

regimen 





Suspected MELISA


- s/p nocturnal pulse oximetry study; report pending - however will likely 

qualify for nocturnal oxygen


- Will need outpatient sleep study; has been discussed by my colleague, Dr. Pat and PCP (Dr. Tyshawn Blancas)





HTN


- Blood pressure well controlled


- c/w Carvedilol, Hydralazine, Furosemide





IDDM2


- c/w ISS and Levemir 





DLP


- c/w Atorvastatin 





Neuropathy


- c/w Gabapentin 





BPH


- c/w Tamsulosin





Gout


- c/w Febuxostat





Chronic ulcers / pain 


- c/w Tramadol 





Vitamin D deficiency


- c/ w Vitamin D supplementation 





DVT prophylaxis


- c/w SCDs; refused pharmacological agents (Aware of risks/benefits)





DISCHARGE MEDICATIONS: 


Please see below.


 


ALLERGIES: 


Please see below.





PHYSICAL EXAMINATION ON DISCHARGE:


Vitals (See below)


General: Lying in bed, no acute distress, comfortable, AAOx3


HEENT: NC, AT


CVS: RRR, +S1S2


Lungs: Fair air entry b/l, does not appear to be any signs of wheezing, rhonchi 

or rales 


Abdomen: Soft, nondistended, without tenderness, Morbid obesity


Extremities: trace pitting edema b/l noted again, chronic venous stasis skin 

changes remain unchanged, - Calf tenderness





LABORATORY DATA: 


Please see below.





ACTIVITY: 


[As tolerated].





DIET:


Fluid restriction of 1700 cc





DISCHARGE PLAN:


Follow-up with Dr. Blancas within 7 days


Remain compliant with treatment plan and medications


Continue with nocturnal oxygen of 2L NC until official sleep study is completed


PCP (Dr. Blancas) to help coordinate sleep study 


Return to the ER if you experience any problems





DISPOSITION:


Home





DISCHARGE CONDITION: 


[Stable].





TIME SPENT ON DISCHARGE: 


Greater than [35] minutes.





Vital Signs/I&Os





Vital Signs








  Date Time  Temp Pulse Resp B/P (MAP) Pulse Ox O2 Delivery O2 Flow Rate FiO2


 


1/17/19 09:10  75  152/60    


 


1/17/19 08:00 97.8  20  96 Nasal Cannula 2.0 














I&O- Last 24 Hours up to 6 AM 


 


 1/17/19





 06:00


 


Intake Total 1352 ml


 


Output Total 3750 ml


 


Balance -2398 ml











Laboratory Data


Labs 24H


Laboratory Tests 2


1/16/19 12:56: Bedside Glucose (Misc Panel) 293H


1/16/19 16:51: Bedside Glucose (Misc Panel) 266H


1/16/19 20:34: Bedside Glucose (Misc Panel) 306H


1/17/19 05:07: 


Nucleated Red Blood Cells % (auto) 0.0, Anion Gap 6L, Glomerular Filtration Rate

 35.7L, Blood Urea Nitrogen 48H, Creatinine 2.03H, Sodium Level 134L, Potassium 

Level 4.7, Chloride Level 95L, Carbon Dioxide Level 33H, Calcium Level 8.4L, 

NT-Pro-B-Type Natriuretic Peptide 1001H


CBC/BMP


Laboratory Tests


1/17/19 05:07








Red Blood Count 4.58, Mean Corpuscular Volume 83.8, Mean Corpuscular Hemoglobin 

24.2 L, Mean Corpuscular Hemoglobin Concent 28.9 L, Red Cell Distribution Width 

15.5 H, Calcium Level 8.4 L


FSBS





Laboratory Tests








Test


 1/16/19


12:56 1/16/19


16:51 1/16/19


20:34 Range/Units


 


 


Bedside Glucose (Misc Panel) 293 266 306   MG/DL











Discharge Medications


Scheduled


Aspirin (Aspirin 81) 81 Mg Tab, 81 MG PO DAILY, (Reported)


Atorvastatin Calcium (Atorvastatin Calcium) 40 Mg Tab, 40 MG PO DAILY, 

(Reported)


Calcitriol (Calcitriol) 0.25 Mcg Cap, 0.25 MCG PO DAILY, (Reported)


Carvedilol (Carvedilol) 3.125 Mg Tab, 3.125 MG PO BID, (Reported)


Cholecalciferol (Vitamin D) 1,000 Unit Tab, 1,000 UNIT PO DAILY, (Reported)


Febuxostat (Uloric) 40 Mg Tab, 40 MG PO DAILY, (Reported)


Gabapentin (Gabapentin) 300 Mg Cap, 300 MG PO TID, (Reported)


Hydralazine HCl (Hydralazine HCl) 10 Mg Tab, 10 MG PO TID, (Reported)


Insulin Detemir (Levemir) 1 Units/0.01 Ml Susp, 10 UNITS SC BID, (Reported)


Magnesium Oxide (Magnesium) 400 Mg Tab, 400 MG PO DAILY, (Reported)


Tamsulosin Hydrochloride (Flomax) 0.4 Mg Cap, 0.4 MG PO QHS, (Reported)


Torsemide (Torsemide) 20 Mg Tab, 20 MG PO BID, (Reported)


Torsemide (Torsemide) 20 Mg Tab, 40 MG PO BID


   Take 2 pills at 6AM and take 2 pills at 2PM 





Scheduled PRN


Nitroglycerin (Nitrostat) 0.4 Mg Subl, 0.4 MG SL Q5MP PRN for CHEST PAIN, 

(Reported)


Tramadol HCl (Tramadol HCl) 50 Mg Tab, 50 MG PO Q8H PRN for PAIN, (Reported)





Allergies


Coded Allergies:  


     Metformin (Unverified  Adverse Reaction, Intermediate, Disturbances in 

Vision, 1/12/19)











NEO BLANCO MD                Jan 17, 2019 10:32

## 2019-01-24 ENCOUNTER — HOSPITAL ENCOUNTER (INPATIENT)
Dept: HOSPITAL 53 - M ED | Age: 64
LOS: 5 days | Discharge: HOME HEALTH | DRG: 291 | End: 2019-01-29
Attending: HOSPITALIST | Admitting: INTERNAL MEDICINE
Payer: MEDICARE

## 2019-01-24 VITALS — SYSTOLIC BLOOD PRESSURE: 152 MMHG | DIASTOLIC BLOOD PRESSURE: 70 MMHG

## 2019-01-24 VITALS — DIASTOLIC BLOOD PRESSURE: 80 MMHG | SYSTOLIC BLOOD PRESSURE: 150 MMHG

## 2019-01-24 VITALS — WEIGHT: 315 LBS | BODY MASS INDEX: 42.66 KG/M2 | HEIGHT: 72 IN

## 2019-01-24 VITALS — DIASTOLIC BLOOD PRESSURE: 79 MMHG | SYSTOLIC BLOOD PRESSURE: 177 MMHG

## 2019-01-24 DIAGNOSIS — Z79.82: ICD-10-CM

## 2019-01-24 DIAGNOSIS — N18.3: ICD-10-CM

## 2019-01-24 DIAGNOSIS — Z91.19: ICD-10-CM

## 2019-01-24 DIAGNOSIS — E11.9: ICD-10-CM

## 2019-01-24 DIAGNOSIS — I27.20: ICD-10-CM

## 2019-01-24 DIAGNOSIS — Z88.8: ICD-10-CM

## 2019-01-24 DIAGNOSIS — I25.10: ICD-10-CM

## 2019-01-24 DIAGNOSIS — Z79.899: ICD-10-CM

## 2019-01-24 DIAGNOSIS — N40.0: ICD-10-CM

## 2019-01-24 DIAGNOSIS — E78.5: ICD-10-CM

## 2019-01-24 DIAGNOSIS — E66.2: ICD-10-CM

## 2019-01-24 DIAGNOSIS — I50.33: ICD-10-CM

## 2019-01-24 DIAGNOSIS — Z79.4: ICD-10-CM

## 2019-01-24 DIAGNOSIS — I47.2: ICD-10-CM

## 2019-01-24 DIAGNOSIS — I13.0: Primary | ICD-10-CM

## 2019-01-24 DIAGNOSIS — M10.9: ICD-10-CM

## 2019-01-24 LAB
ALBUMIN SERPL BCG-MCNC: 2.8 GM/DL (ref 3.2–5.2)
ALT SERPL W P-5'-P-CCNC: 13 U/L (ref 12–78)
BASOPHILS # BLD AUTO: 0 10^3/UL (ref 0–0.2)
BASOPHILS NFR BLD AUTO: 0.3 % (ref 0–1)
BILIRUB CONJ SERPL-MCNC: 0.2 MG/DL (ref 0–0.2)
BILIRUB SERPL-MCNC: 0.5 MG/DL (ref 0.2–1)
BUN SERPL-MCNC: 37 MG/DL (ref 7–18)
BUN SERPL-MCNC: 39 MG/DL (ref 7–18)
CALCIUM SERPL-MCNC: 8.5 MG/DL (ref 8.8–10.2)
CALCIUM SERPL-MCNC: 8.6 MG/DL (ref 8.8–10.2)
CHLORIDE SERPL-SCNC: 96 MEQ/L (ref 98–107)
CHLORIDE SERPL-SCNC: 97 MEQ/L (ref 98–107)
CK MB CFR.DF SERPL CALC: 3.56
CK MB CFR.DF SERPL CALC: 4.32
CK MB SERPL-MCNC: 3 NG/ML (ref ?–3.6)
CK MB SERPL-MCNC: 3 NG/ML (ref ?–3.6)
CK SERPL-CCNC: 73 U/L (ref 39–308)
CK SERPL-CCNC: 74 U/L (ref 39–308)
CO2 SERPL-SCNC: 30 MEQ/L (ref 21–32)
CO2 SERPL-SCNC: 31 MEQ/L (ref 21–32)
CREAT SERPL-MCNC: 1.98 MG/DL (ref 0.7–1.3)
CREAT SERPL-MCNC: 2.02 MG/DL (ref 0.7–1.3)
EOSINOPHIL # BLD AUTO: 0.4 10^3/UL (ref 0–0.5)
EOSINOPHIL NFR BLD AUTO: 4.5 % (ref 0–3)
GFR SERPL CREATININE-BSD FRML MDRD: 35.9 ML/MIN/{1.73_M2} (ref 49–?)
GFR SERPL CREATININE-BSD FRML MDRD: 36.8 ML/MIN/{1.73_M2} (ref 49–?)
GLUCOSE SERPL-MCNC: 290 MG/DL (ref 70–100)
GLUCOSE SERPL-MCNC: 308 MG/DL (ref 70–100)
HCT VFR BLD AUTO: 37.8 % (ref 42–52)
HGB BLD-MCNC: 11.3 G/DL (ref 13.5–17.5)
LYMPHOCYTES # BLD AUTO: 1.3 10^3/UL (ref 1.5–4.5)
LYMPHOCYTES NFR BLD AUTO: 14.8 % (ref 24–44)
MAGNESIUM SERPL-MCNC: 1.6 MG/DL (ref 1.8–2.4)
MCH RBC QN AUTO: 24.6 PG (ref 27–33)
MCHC RBC AUTO-ENTMCNC: 29.9 G/DL (ref 32–36.5)
MCV RBC AUTO: 82.2 FL (ref 80–96)
MONOCYTES # BLD AUTO: 0.6 10^3/UL (ref 0–0.8)
MONOCYTES NFR BLD AUTO: 6.6 % (ref 0–5)
NEUTROPHILS # BLD AUTO: 6.5 10^3/UL (ref 1.8–7.7)
NEUTROPHILS NFR BLD AUTO: 73.4 % (ref 36–66)
NT-PRO BNP: 1184 PG/ML (ref ?–125)
PLATELET # BLD AUTO: 198 10^3/UL (ref 150–450)
POTASSIUM SERPL-SCNC: 4.3 MEQ/L (ref 3.5–5.1)
POTASSIUM SERPL-SCNC: 4.5 MEQ/L (ref 3.5–5.1)
PROT SERPL-MCNC: 7 GM/DL (ref 6.4–8.2)
RBC # BLD AUTO: 4.6 10^6/UL (ref 4.3–6.1)
SODIUM SERPL-SCNC: 135 MEQ/L (ref 136–145)
SODIUM SERPL-SCNC: 135 MEQ/L (ref 136–145)
TROPONIN I SERPL-MCNC: 0.02 NG/ML (ref ?–0.1)
TROPONIN I SERPL-MCNC: 0.04 NG/ML (ref ?–0.1)
WBC # BLD AUTO: 8.9 10^3/UL (ref 4–10)

## 2019-01-24 RX ADMIN — ATORVASTATIN CALCIUM SCH MG: 20 TABLET, FILM COATED ORAL at 13:41

## 2019-01-24 RX ADMIN — GABAPENTIN SCH MG: 300 CAPSULE ORAL at 16:41

## 2019-01-24 RX ADMIN — DEXTROSE MONOHYDRATE ONE MG: 50 INJECTION, SOLUTION INTRAVENOUS at 10:45

## 2019-01-24 RX ADMIN — MAGNESIUM SULFATE IN DEXTROSE SCH MLS/HR: 10 INJECTION, SOLUTION INTRAVENOUS at 16:54

## 2019-01-24 RX ADMIN — INSULIN DETEMIR SCH UNITS: 100 INJECTION, SOLUTION SUBCUTANEOUS at 20:17

## 2019-01-24 RX ADMIN — FEBUXOSTAT SCH MG: 40 TABLET, COATED ORAL at 16:54

## 2019-01-24 RX ADMIN — TAMSULOSIN HYDROCHLORIDE SCH MG: 0.4 CAPSULE ORAL at 20:15

## 2019-01-24 RX ADMIN — DEXTROSE MONOHYDRATE ONE MG: 50 INJECTION, SOLUTION INTRAVENOUS at 10:59

## 2019-01-24 RX ADMIN — INSULIN DETEMIR SCH UNITS: 100 INJECTION, SOLUTION SUBCUTANEOUS at 13:41

## 2019-01-24 RX ADMIN — FUROSEMIDE SCH MG: 10 INJECTION, SOLUTION INTRAMUSCULAR; INTRAVENOUS at 20:00

## 2019-01-24 RX ADMIN — SODIUM CHLORIDE SCH UNITS: 4.5 INJECTION, SOLUTION INTRAVENOUS at 21:48

## 2019-01-24 RX ADMIN — SODIUM CHLORIDE SCH UNITS: 4.5 INJECTION, SOLUTION INTRAVENOUS at 14:00

## 2019-01-24 RX ADMIN — GABAPENTIN SCH MG: 300 CAPSULE ORAL at 20:15

## 2019-01-24 RX ADMIN — FUROSEMIDE SCH MG: 10 INJECTION, SOLUTION INTRAMUSCULAR; INTRAVENOUS at 16:42

## 2019-01-24 RX ADMIN — Medication SCH UNITS: at 13:42

## 2019-01-24 RX ADMIN — SODIUM CHLORIDE, PRESERVATIVE FREE SCH ML: 5 INJECTION INTRAVENOUS at 21:48

## 2019-01-24 RX ADMIN — HYDRALAZINE HYDROCHLORIDE SCH MG: 10 TABLET, FILM COATED ORAL at 20:16

## 2019-01-24 RX ADMIN — ASPIRIN SCH MG: 81 TABLET ORAL at 13:43

## 2019-01-24 RX ADMIN — MAGNESIUM SULFATE IN DEXTROSE SCH MLS/HR: 10 INJECTION, SOLUTION INTRAVENOUS at 17:51

## 2019-01-24 RX ADMIN — FUROSEMIDE SCH MG: 10 INJECTION, SOLUTION INTRAMUSCULAR; INTRAVENOUS at 13:42

## 2019-01-24 RX ADMIN — HYDRALAZINE HYDROCHLORIDE SCH MG: 10 TABLET, FILM COATED ORAL at 16:42

## 2019-01-24 NOTE — REP
Chest two views

 

HISTORY: Shortness of breath

 

Comparison: 01/16/2019

 

A minimal increase in interstitial markings is present in the lungs.  The cardiac

silhouette is enlarged. The pulmonary vasculature is normal in appearance.  The

bony structure is intact.

 

IMPRESSION:

1.  There is a minimal increase in interstitial markings in the lungs consistent

with interstitial edema.

2.  Cardiomegaly.

 

 

Electronically Signed by

Alphonso Hilton MD 01/24/2019 08:59 A

## 2019-01-24 NOTE — REP
Duplex extremity venous ultrasound: Bilateral lower extremities.

 

History:  Swelling.

 

Findings:  The deep veins are anechoic and fully compressible from the groin to

the popliteal fossa in the left and right lower extremity.  Color flow imaging is

homogeneous.  Spectral Doppler interrogation demonstrates intact respiratory

variation in flow and normal manual augmentation of flow.  There is no evidence

of deep vein thrombosis.

 

Impression:

 

Negative bilateral lower extremity duplex venous ultrasound.  No evidence of deep

vein thrombosis.

 

 

Electronically Signed by

Gulshan Wellington MD 01/24/2019 09:53 A

## 2019-01-24 NOTE — HPEPDOC
[facility] Medical History & Physical


Date of Admission


Jan 24, 2019





History and Physical


PRIMARY CARE PROVIDER:[facility] 





ATTENDING: Dr. Neftali Bal





CHIEF COMPLAINT: SOB





HISTORY OF PRESENT ILLNESS: 


This is a 63-year-old male past medical history of congestive heart failure EF 

of 35% as, diastolic component as well, severe pulmonary hypertension, CK D 

stage III baseline creatinine 1.9-2.0, obesity hypoventilation syndrome, 

diabetes mellitus, chronic lower extremity lymphedema, CAD status post PCI who 

presents complaining of shortness of breath.





Patient states he's been short of breath over the past few days, he states from 

his last admission, he has been increased to torsemide 40 twice a day, however 

he has been running to the bathroom frequently, and there is some question about

noncompliance given his significant urination.





Patient drinks 3 cups of water daily, and does not follow a low-sodium diet.





The patient denies any chest pain or palpitations. No abdominal pain. No 

syncopal episode. Does have PND as well as dyspnea on exertion with mild 

exertion. Positive orthopnea. Increased lower extremity edema.





Patient was noted to be in congestive heart failure, decompensated, started on 

IV Lasix.








PAST MEDICAL HISTORY: As per HPI





PAST SURGICAL HISTORY: I&D right foot. CAD s/p PCI, Appendectomy, Thyroidectomy,

Left testicular resection 





SOCIAL HISTORY: History of tobacco abuse however quit 1 year ago. No alcohol 

use.





FAMILY HISTORY: Noncontributory





ALLERGIES: Please see below.





REVIEW OF SYSTEMS:


HEENT: Denies sore throat/headache


CARDIOVASCULAR: Denies chest pain/palpitations


RESPIRATORY: Denies shortness of breath/cough


GASTROINTESTINAL: denies nausea/vomiting


GENITOURINARY: Denies dysuria/urinary urgency.


MUSCULOSKELETAL: Denies myalgias/arthralgias


NEUROLOGICAL: Denies any focal weakness








HOME MEDICATIONS: Please see below. 





PHYSICAL EXAMINATION:


Vitals: (see below) 


General: No acute distress, laying comfortably in bed.


HEENT: Moist mucous membranes.


Neck: No JVD or lymphadenopathy


Cardiac: RRR, No murmurs


Pulm: Diminished breath sounds at the bases. Coarse crackles. B/l. No wheezing, 

rhonchi


Abd: NT/ND + BS. Obese


Ext: 2-3+ pitting edema bilateral lower extremities right greater than left. 

Chronic lymphedema. Or cyanosis. Distal pulses intact.





LABORATORY DATA: See below.





IMAGING: 


Ultrasound bilateral lower extremities 1/24/19 Impression: Negative bilateral 

lower extremity duplex venous ultrasound.  No evidence of deep vein thrombosis.


Chest x-ray in 1/24/19


IMPRESSION:


1.  There is a minimal increase in interstitial markings in the lungs consistent


with interstitial edema.


2.  Cardiomegaly.





ASSESSMENT/PLAN: 


1. Acute decompensated diastolic heart failure- patient with the above 45%. 

Diastolic components as well. History of severe pulmonary hypertension as well. 

Certainly components of noncompliance. We'll start the patient's IV Lasix. He 

has requested a Osorio catheter, and this is an not unreasonable as we will need 

strict monitoring of I/O. We'll monitor in telemetry. Trend cardiac enzymes. 

Recent echocardiogram done on 1/14/19.


2. ? MELISA. Patient will need outpatient referral to pulmonary. He is agreeable.


3. Hypertension continue carvedilol, hydralazine, Lasix.


4. Insulin-dependent diabetes mellitus continue Levemir and sliding scale 

insulin.


5. Hyperlipidemia on statin


6. History of BPH on Flomax


7. History of gout


8. History of chronic ulcer/pain on tramadol. Follow up outpatient with wound 

care. No signs of infection at this time.





DVT prophylaxis heparin subcutaneous





Overall prognosis guarded.





Vital Signs





Vital Signs








  Date Time  Temp Pulse Resp B/P (MAP) Pulse Ox O2 Delivery O2 Flow Rate FiO2


 


1/24/19 12:06  71   94  3.0 


 


1/24/19 12:01    153/74 (100)    


 


1/24/19 11:34   18   Nasal Cannula  


 


1/24/19 06:18 99.0       











Laboratory Data


Labs 24H


Laboratory Tests 2


1/24/19 06:37: 


Immature Granulocyte % (Auto) 0.4, White Blood Count 8.9, Red Blood Count 4.60, 

Hemoglobin 11.3L, Hematocrit 37.8L, Mean Corpuscular Volume 82.2, Mean Corpusc

ular Hemoglobin 24.6L, Mean Corpuscular Hemoglobin Concent 29.9L, Red Cell 

Distribution Width 16.1H, Platelet Count 198, Neutrophils (%) (Auto) 73.4H, 

Lymphocytes (%) (Auto) 14.8L, Monocytes (%) (Auto) 6.6H, Eosinophils (%) (Auto) 

4.5H, Basophils (%) (Auto) 0.3, Neutrophils # (Auto) 6.5, Lymphocytes # (Auto) 

1.3L, Monocytes # (Auto) 0.6, Eosinophils # (Auto) 0.4, Basophils # (Auto) 0.0, 

Nucleated Red Blood Cells % (auto) 0.0, Anion Gap 8, Glomerular Filtration Rate 

36.8L, Calcium Level 8.5L, Aspartate Amino Transf (AST/SGOT) 12, Alanine 

Aminotransferase (ALT/SGPT) 13, Alkaline Phosphatase 123H, Total Bilirubin 0.5, 

Direct Bilirubin 0.2, Total Creatine Kinase 74, Creatine Kinase MB 3.0, Creatine

Kinase MB Relative Index 4.32H, Troponin I 0.04, NT-Pro-B-Type Natriuretic 

Peptide 1184H, Total Protein 7.0, Albumin 2.8L, Albumin/Globulin Ratio 0.67L


CBC/BMP


Laboratory Tests


1/24/19 06:37








Red Blood Count 4.60, Mean Corpuscular Volume 82.2, Mean Corpuscular Hemoglobin 

24.6 L, Mean Corpuscular Hemoglobin Concent 29.9 L, Red Cell Distribution Width 

16.1 H, Neutrophils (%) (Auto) 73.4 H, Lymphocytes (%) (Auto) 14.8 L, Monocytes 

(%) (Auto) 6.6 H, Eosinophils (%) (Auto) 4.5 H, Basophils (%) (Auto) 0.3, 

Neutrophils # (Auto) 6.5, Lymphocytes # (Auto) 1.3 L, Monocytes # (Auto) 0.6, 

Eosinophils # (Auto) 0.4, Basophils # (Auto) 0.0





Home Medications


Scheduled


Aspirin (Aspirin 81) 81 Mg Tab, 81 MG PO DAILY


Atorvastatin Calcium (Atorvastatin Calcium) 40 Mg Tab, 40 MG PO DAILY


Calcitriol (Calcitriol) 0.25 Mcg Cap, 0.25 MCG PO DAILY


Carvedilol (Carvedilol) 3.125 Mg Tab, 3.125 MG PO BID


Cholecalciferol (Vitamin D) 1,000 Unit Tab, 1,000 UNIT PO DAILY


Febuxostat (Uloric) 40 Mg Tab, 40 MG PO DAILY


Gabapentin (Gabapentin) 300 Mg Cap, 300 MG PO TID


Hydralazine HCl (Hydralazine HCl) 10 Mg Tab, 10 MG PO TID


Insulin Detemir (Levemir) 1 Units/0.01 Ml Susp, 10 UNITS SC BID


Magnesium Oxide (Magnesium) 400 Mg Tab, 400 MG PO DAILY


Tamsulosin Hydrochloride (Flomax) 0.4 Mg Cap, 0.4 MG PO QHS


Torsemide (Torsemide) 20 Mg Tab, 40 MG PO BID


   TAKES AT 0600/1400 





Scheduled PRN


Nitroglycerin (Nitrostat) 0.4 Mg Subl, 0.4 MG SL Q5MP PRN for CHEST PAIN


Tramadol HCl (Tramadol HCl) 50 Mg Tab, 50 MG PO Q8H PRN for PAIN





Allergies


Coded Allergies:  


     Metformin (Unverified  Adverse Reaction, Intermediate, Disturbances in 

Vision, 1/24/19)











NEFTALI BAL MD                 Jan 24, 2019 14:11

## 2019-01-25 VITALS — SYSTOLIC BLOOD PRESSURE: 146 MMHG | DIASTOLIC BLOOD PRESSURE: 72 MMHG

## 2019-01-25 VITALS — SYSTOLIC BLOOD PRESSURE: 155 MMHG | DIASTOLIC BLOOD PRESSURE: 82 MMHG

## 2019-01-25 VITALS — SYSTOLIC BLOOD PRESSURE: 145 MMHG | DIASTOLIC BLOOD PRESSURE: 82 MMHG

## 2019-01-25 VITALS — SYSTOLIC BLOOD PRESSURE: 147 MMHG | DIASTOLIC BLOOD PRESSURE: 91 MMHG

## 2019-01-25 VITALS — DIASTOLIC BLOOD PRESSURE: 70 MMHG | SYSTOLIC BLOOD PRESSURE: 162 MMHG

## 2019-01-25 VITALS — SYSTOLIC BLOOD PRESSURE: 151 MMHG | DIASTOLIC BLOOD PRESSURE: 67 MMHG

## 2019-01-25 VITALS — DIASTOLIC BLOOD PRESSURE: 85 MMHG | SYSTOLIC BLOOD PRESSURE: 184 MMHG

## 2019-01-25 VITALS — DIASTOLIC BLOOD PRESSURE: 80 MMHG | SYSTOLIC BLOOD PRESSURE: 164 MMHG

## 2019-01-25 LAB
BUN SERPL-MCNC: 36 MG/DL (ref 7–18)
CALCIUM SERPL-MCNC: 8.3 MG/DL (ref 8.8–10.2)
CHLORIDE SERPL-SCNC: 95 MEQ/L (ref 98–107)
CO2 SERPL-SCNC: 33 MEQ/L (ref 21–32)
CREAT SERPL-MCNC: 2.02 MG/DL (ref 0.7–1.3)
GFR SERPL CREATININE-BSD FRML MDRD: 35.9 ML/MIN/{1.73_M2} (ref 49–?)
GLUCOSE SERPL-MCNC: 276 MG/DL (ref 70–100)
HCT VFR BLD AUTO: 36.1 % (ref 42–52)
HGB BLD-MCNC: 10.8 G/DL (ref 13.5–17.5)
MAGNESIUM SERPL-MCNC: 1.9 MG/DL (ref 1.8–2.4)
MCH RBC QN AUTO: 24.6 PG (ref 27–33)
MCHC RBC AUTO-ENTMCNC: 29.9 G/DL (ref 32–36.5)
MCV RBC AUTO: 82.2 FL (ref 80–96)
PLATELET # BLD AUTO: 187 10^3/UL (ref 150–450)
POTASSIUM SERPL-SCNC: 4.4 MEQ/L (ref 3.5–5.1)
RBC # BLD AUTO: 4.39 10^6/UL (ref 4.3–6.1)
SODIUM SERPL-SCNC: 134 MEQ/L (ref 136–145)
WBC # BLD AUTO: 7.6 10^3/UL (ref 4–10)

## 2019-01-25 RX ADMIN — HYDRALAZINE HYDROCHLORIDE SCH MG: 10 TABLET, FILM COATED ORAL at 20:34

## 2019-01-25 RX ADMIN — Medication SCH UNITS: at 08:25

## 2019-01-25 RX ADMIN — FUROSEMIDE SCH MG: 10 INJECTION, SOLUTION INTRAMUSCULAR; INTRAVENOUS at 23:48

## 2019-01-25 RX ADMIN — ASPIRIN SCH MG: 81 TABLET ORAL at 08:25

## 2019-01-25 RX ADMIN — INSULIN LISPRO SCH UNITS: 100 INJECTION, SOLUTION INTRAVENOUS; SUBCUTANEOUS at 17:21

## 2019-01-25 RX ADMIN — FUROSEMIDE SCH MG: 10 INJECTION, SOLUTION INTRAMUSCULAR; INTRAVENOUS at 16:00

## 2019-01-25 RX ADMIN — SODIUM CHLORIDE, PRESERVATIVE FREE SCH ML: 5 INJECTION INTRAVENOUS at 20:44

## 2019-01-25 RX ADMIN — FUROSEMIDE SCH MG: 10 INJECTION, SOLUTION INTRAMUSCULAR; INTRAVENOUS at 20:00

## 2019-01-25 RX ADMIN — INSULIN LISPRO SCH UNITS: 100 INJECTION, SOLUTION INTRAVENOUS; SUBCUTANEOUS at 12:36

## 2019-01-25 RX ADMIN — SODIUM CHLORIDE, PRESERVATIVE FREE SCH ML: 5 INJECTION INTRAVENOUS at 06:00

## 2019-01-25 RX ADMIN — FUROSEMIDE SCH MG: 10 INJECTION, SOLUTION INTRAMUSCULAR; INTRAVENOUS at 08:13

## 2019-01-25 RX ADMIN — INSULIN DETEMIR SCH UNITS: 100 INJECTION, SOLUTION SUBCUTANEOUS at 08:25

## 2019-01-25 RX ADMIN — FUROSEMIDE SCH MG: 10 INJECTION, SOLUTION INTRAMUSCULAR; INTRAVENOUS at 12:00

## 2019-01-25 RX ADMIN — SODIUM CHLORIDE, PRESERVATIVE FREE SCH ML: 5 INJECTION INTRAVENOUS at 14:00

## 2019-01-25 RX ADMIN — ATORVASTATIN CALCIUM SCH MG: 20 TABLET, FILM COATED ORAL at 08:24

## 2019-01-25 RX ADMIN — GABAPENTIN SCH MG: 300 CAPSULE ORAL at 16:42

## 2019-01-25 RX ADMIN — FEBUXOSTAT SCH MG: 40 TABLET, COATED ORAL at 08:24

## 2019-01-25 RX ADMIN — FUROSEMIDE SCH MG: 10 INJECTION, SOLUTION INTRAMUSCULAR; INTRAVENOUS at 04:07

## 2019-01-25 RX ADMIN — GABAPENTIN SCH MG: 300 CAPSULE ORAL at 20:33

## 2019-01-25 RX ADMIN — SODIUM CHLORIDE SCH UNITS: 4.5 INJECTION, SOLUTION INTRAVENOUS at 14:00

## 2019-01-25 RX ADMIN — HYDRALAZINE HYDROCHLORIDE SCH MG: 10 TABLET, FILM COATED ORAL at 08:25

## 2019-01-25 RX ADMIN — SODIUM CHLORIDE SCH UNITS: 4.5 INJECTION, SOLUTION INTRAVENOUS at 20:44

## 2019-01-25 RX ADMIN — HYDRALAZINE HYDROCHLORIDE SCH MG: 10 TABLET, FILM COATED ORAL at 16:42

## 2019-01-25 RX ADMIN — FUROSEMIDE SCH MG: 10 INJECTION, SOLUTION INTRAMUSCULAR; INTRAVENOUS at 00:00

## 2019-01-25 RX ADMIN — INSULIN LISPRO SCH UNITS: 100 INJECTION, SOLUTION INTRAVENOUS; SUBCUTANEOUS at 20:33

## 2019-01-25 RX ADMIN — TAMSULOSIN HYDROCHLORIDE SCH MG: 0.4 CAPSULE ORAL at 20:33

## 2019-01-25 RX ADMIN — GABAPENTIN SCH MG: 300 CAPSULE ORAL at 08:25

## 2019-01-25 RX ADMIN — SODIUM CHLORIDE SCH UNITS: 4.5 INJECTION, SOLUTION INTRAVENOUS at 06:00

## 2019-01-25 NOTE — ECGEPIP
Stationary ECG Study

                           [facility] - ED

                                       

                                       Test Date:    2019

Pat Name:     ALESIA SANTORO           Department:   

Patient ID:   [id number]                 Room:         -

Gender:       M                        Technician:   AK

:          1955               Requested By: ERNA SALAS

Order Number: [id number]     Reading MD:   Rabia Trinidad

                                 Measurements

Intervals                              Axis          

Rate:         78                       P:            34

NC:           174                      QRS:          78

QRSD:         114                      T:            42

QT:           408                                    

QTc:          467                                    

                           Interpretive Statements

SINUS RHYTHM

LOW QRS VOLTAGE IN PRECORDIAL LEADS

POSSIBLE ANTERIOR MYOCARDIAL INFARCTION, OF INDETERMINATE AGE

SIMILAR 21

Electronically Signed On 2019 10:31:43 EST by Rabia Trinidad

## 2019-01-25 NOTE — IPNPDOC
Text Note


Date of Service


The patient was seen on 1/25/19.





NOTE


Subjective: Patient notes that his dyspnea is improving.





PHYSICAL EXAMINATION:


Vitals: (see below) 


General: No acute distress, laying comfortably in bed.


HEENT: Moist mucous membranes.


Neck: No JVD or lymphadenopathy


Cardiac: RRR, No murmurs


Pulm: Diminished breath sounds at the bases. Coarse crackles. B/l. No wheezing, 

rhonchi


Abd: NT/ND + BS. Obese


Ext: 2-3+ pitting edema bilateral lower extremities right greater than left. 

Chronic lymphedema. Or cyanosis. Distal pulses intact.





LABORATORY DATA: See below.





IMAGING: 


Ultrasound bilateral lower extremities 1/24/19 Impression: Negative bilateral 

lower extremity duplex venous ultrasound.  No evidence of deep vein thrombosis.


Chest x-ray in 1/24/19


IMPRESSION:


1.  There is a minimal increase in interstitial markings in the lungs consistent


with interstitial edema.


2.  Cardiomegaly.





ASSESSMENT/PLAN: 


1. Acute decompensated diastolic heart failure- patient with EF 45% as well.  

History of severe pulmonary hypertension as well. Certainly components of 

noncompliance. Continue patient's IV Lasix. Osorio catheter for strict monitoring

of I/O. We'll monitor in telemetry. Trend cardiac enzymes. Recent echocardiogram

done on 1/14/19.


2. ? MELISA. Patient will need outpatient referral to pulmonary. He is agreeable.


3. Hypertension continue carvedilol, hydralazine, Lasix.


4. Insulin-dependent diabetes mellitus continue Levemir and sliding scale 

insulin.


5. Hyperlipidemia on statin


6. History of BPH on Flomax


7. History of gout


8. History of chronic ulcer/pain on tramadol. Follow up outpatient with wound 

care. No signs of infection at this time.


9. NSVT- 6 beats on 1/24. Asymptomatic. Replace K/mag as needed. Denies chest 

pain. We'll continue to monitor on telemetry.





DVT prophylaxis heparin subcutaneous





Overall prognosis guarded.





VS,Fishbone, I+O


VS, Fishbone, I+O


Laboratory Tests


1/24/19 15:21








Calcium Level 8.6 L, Total Creatine Kinase 73





1/25/19 07:52








Calcium Level 8.3 L, Red Blood Count 4.39, Mean Corpuscular Volume 82.2, Mean 

Corpuscular Hemoglobin 24.6 L, Mean Corpuscular Hemoglobin Concent 29.9 L, Red 

Cell Distribution Width 16.1 H








Vital Signs








  Date Time  Temp Pulse Resp B/P (MAP) Pulse Ox O2 Delivery O2 Flow Rate FiO2


 


1/25/19 12:00       2.0 


 


1/25/19 12:00 98.4 71 18 151/67 (95) 92 Nasal Cannula  














I&O- Last 24 Hours up to 6 AM 


 


 1/25/19





 06:00


 


Intake Total 1150 ml


 


Output Total 5020 ml


 


Balance -3870 ml

















NEFTALI SUMMERS MD                 Jan 25, 2019 13:40

## 2019-01-26 VITALS — SYSTOLIC BLOOD PRESSURE: 164 MMHG | DIASTOLIC BLOOD PRESSURE: 74 MMHG

## 2019-01-26 VITALS — DIASTOLIC BLOOD PRESSURE: 77 MMHG | SYSTOLIC BLOOD PRESSURE: 162 MMHG

## 2019-01-26 VITALS — DIASTOLIC BLOOD PRESSURE: 73 MMHG | SYSTOLIC BLOOD PRESSURE: 166 MMHG

## 2019-01-26 VITALS — DIASTOLIC BLOOD PRESSURE: 65 MMHG | SYSTOLIC BLOOD PRESSURE: 144 MMHG

## 2019-01-26 VITALS — SYSTOLIC BLOOD PRESSURE: 144 MMHG | DIASTOLIC BLOOD PRESSURE: 77 MMHG

## 2019-01-26 VITALS — DIASTOLIC BLOOD PRESSURE: 70 MMHG | SYSTOLIC BLOOD PRESSURE: 150 MMHG

## 2019-01-26 LAB
ALBUMIN SERPL BCG-MCNC: 2.6 GM/DL (ref 3.2–5.2)
ALT SERPL W P-5'-P-CCNC: 9 U/L (ref 12–78)
BILIRUB SERPL-MCNC: 0.4 MG/DL (ref 0.2–1)
BUN SERPL-MCNC: 42 MG/DL (ref 7–18)
CALCIUM SERPL-MCNC: 8.2 MG/DL (ref 8.8–10.2)
CHLORIDE SERPL-SCNC: 95 MEQ/L (ref 98–107)
CO2 SERPL-SCNC: 32 MEQ/L (ref 21–32)
CREAT SERPL-MCNC: 1.98 MG/DL (ref 0.7–1.3)
GFR SERPL CREATININE-BSD FRML MDRD: 36.8 ML/MIN/{1.73_M2} (ref 49–?)
GLUCOSE SERPL-MCNC: 304 MG/DL (ref 70–100)
HCT VFR BLD AUTO: 37 % (ref 42–52)
HGB BLD-MCNC: 11 G/DL (ref 13.5–17.5)
MAGNESIUM SERPL-MCNC: 1.7 MG/DL (ref 1.8–2.4)
MCH RBC QN AUTO: 24.1 PG (ref 27–33)
MCHC RBC AUTO-ENTMCNC: 29.7 G/DL (ref 32–36.5)
MCV RBC AUTO: 81.1 FL (ref 80–96)
PLATELET # BLD AUTO: 177 10^3/UL (ref 150–450)
POTASSIUM SERPL-SCNC: 4.4 MEQ/L (ref 3.5–5.1)
PROT SERPL-MCNC: 7.1 GM/DL (ref 6.4–8.2)
RBC # BLD AUTO: 4.56 10^6/UL (ref 4.3–6.1)
SODIUM SERPL-SCNC: 135 MEQ/L (ref 136–145)
WBC # BLD AUTO: 7.8 10^3/UL (ref 4–10)

## 2019-01-26 RX ADMIN — INSULIN LISPRO SCH UNITS: 100 INJECTION, SOLUTION INTRAVENOUS; SUBCUTANEOUS at 07:45

## 2019-01-26 RX ADMIN — FEBUXOSTAT SCH MG: 40 TABLET, COATED ORAL at 08:17

## 2019-01-26 RX ADMIN — SODIUM CHLORIDE, PRESERVATIVE FREE SCH ML: 5 INJECTION INTRAVENOUS at 14:00

## 2019-01-26 RX ADMIN — INSULIN DETEMIR SCH UNITS: 100 INJECTION, SOLUTION SUBCUTANEOUS at 09:16

## 2019-01-26 RX ADMIN — INSULIN LISPRO SCH UNITS: 100 INJECTION, SOLUTION INTRAVENOUS; SUBCUTANEOUS at 17:20

## 2019-01-26 RX ADMIN — SODIUM CHLORIDE SCH UNITS: 4.5 INJECTION, SOLUTION INTRAVENOUS at 14:00

## 2019-01-26 RX ADMIN — GABAPENTIN SCH MG: 300 CAPSULE ORAL at 08:17

## 2019-01-26 RX ADMIN — SODIUM CHLORIDE, PRESERVATIVE FREE SCH ML: 5 INJECTION INTRAVENOUS at 20:36

## 2019-01-26 RX ADMIN — TAMSULOSIN HYDROCHLORIDE SCH MG: 0.4 CAPSULE ORAL at 20:38

## 2019-01-26 RX ADMIN — FUROSEMIDE SCH MG: 10 INJECTION, SOLUTION INTRAMUSCULAR; INTRAVENOUS at 08:00

## 2019-01-26 RX ADMIN — HYDRALAZINE HYDROCHLORIDE SCH MG: 50 TABLET, FILM COATED ORAL at 20:38

## 2019-01-26 RX ADMIN — FUROSEMIDE SCH MG: 10 INJECTION, SOLUTION INTRAMUSCULAR; INTRAVENOUS at 03:38

## 2019-01-26 RX ADMIN — GABAPENTIN SCH MG: 300 CAPSULE ORAL at 16:09

## 2019-01-26 RX ADMIN — GABAPENTIN SCH MG: 300 CAPSULE ORAL at 20:38

## 2019-01-26 RX ADMIN — FUROSEMIDE SCH MG: 10 INJECTION, SOLUTION INTRAMUSCULAR; INTRAVENOUS at 23:49

## 2019-01-26 RX ADMIN — SODIUM CHLORIDE, PRESERVATIVE FREE SCH ML: 5 INJECTION INTRAVENOUS at 03:39

## 2019-01-26 RX ADMIN — HYDRALAZINE HYDROCHLORIDE SCH MG: 50 TABLET, FILM COATED ORAL at 08:18

## 2019-01-26 RX ADMIN — ATORVASTATIN CALCIUM SCH MG: 20 TABLET, FILM COATED ORAL at 08:18

## 2019-01-26 RX ADMIN — SODIUM CHLORIDE SCH UNITS: 4.5 INJECTION, SOLUTION INTRAVENOUS at 20:39

## 2019-01-26 RX ADMIN — INSULIN LISPRO SCH UNITS: 100 INJECTION, SOLUTION INTRAVENOUS; SUBCUTANEOUS at 20:37

## 2019-01-26 RX ADMIN — Medication SCH UNITS: at 08:18

## 2019-01-26 RX ADMIN — ASPIRIN SCH MG: 81 TABLET ORAL at 08:18

## 2019-01-26 RX ADMIN — HYDRALAZINE HYDROCHLORIDE SCH MG: 50 TABLET, FILM COATED ORAL at 16:09

## 2019-01-26 RX ADMIN — FUROSEMIDE SCH MG: 10 INJECTION, SOLUTION INTRAMUSCULAR; INTRAVENOUS at 13:26

## 2019-01-26 RX ADMIN — FUROSEMIDE SCH MG: 10 INJECTION, SOLUTION INTRAMUSCULAR; INTRAVENOUS at 18:22

## 2019-01-26 RX ADMIN — INSULIN LISPRO SCH UNITS: 100 INJECTION, SOLUTION INTRAVENOUS; SUBCUTANEOUS at 13:25

## 2019-01-26 RX ADMIN — INSULIN DETEMIR SCH UNITS: 100 INJECTION, SOLUTION SUBCUTANEOUS at 20:38

## 2019-01-26 RX ADMIN — SODIUM CHLORIDE SCH UNITS: 4.5 INJECTION, SOLUTION INTRAVENOUS at 03:38

## 2019-01-26 NOTE — IPNPDOC
Text Note


Date of Service


The patient was seen on 1/26/19.





NOTE


Subjective: No CP/Palpitations. No N/V/Abd pain. Dyspnea improving. 





PHYSICAL EXAMINATION:


Vitals: (see below) 


General: No acute distress, laying comfortably in bed.


HEENT: Moist mucous membranes.


Neck: No JVD or lymphadenopathy


Cardiac: RRR, No murmurs


Pulm: Diminished breath sounds at the bases. Coarse crackles. B/l. No wheezing, 

rhonchi


Abd: NT/ND + BS. Obese


Ext: 2-3+ pitting edema bilateral lower extremities right greater than left. 

Chronic lymphedema. Or cyanosis. Distal pulses intact.





LABORATORY DATA: See below.





IMAGING: 


Ultrasound bilateral lower extremities 1/24/19 Impression: Negative bilateral 

lower extremity duplex venous ultrasound.  No evidence of deep vein thrombosis.


Chest x-ray in 1/24/19


IMPRESSION:


1.  There is a minimal increase in interstitial markings in the lungs consistent


with interstitial edema.


2.  Cardiomegaly.





ASSESSMENT/PLAN: 


1. Acute decompensated diastolic heart failure- patient with EF 45% as well.  

History of severe pulmonary hypertension as well. Certainly components of 

noncompliance. Continue patient's IV Lasix. Osorio catheter for strict monitoring

of I/O. We'll monitor in telemetry. Trend cardiac enzymes. Recent echocardiogram

done on 1/14/19. Diuresing well. 


2. ? MELISA. Patient will need outpatient referral to pulmonary. He is agreeable.


3. Hypertension continue carvedilol, hydralazine, Lasix.


4. Insulin-dependent diabetes mellitus continue Levemir and sliding scale 

insulin.


5. Hyperlipidemia on statin


6. History of BPH on Flomax


7. History of gout


8. History of chronic ulcer/pain on tramadol. Follow up outpatient with wound 

care. No signs of infection at this time.


9. NSVT- 6 beats on 1/24. Asymptomatic. Replace K/mag as needed. Denies chest 

pain. We'll continue to monitor on telemetry.





DVT prophylaxis heparin subcutaneous





Overall prognosis guarded.





VS,Fishbone, I+O


VS, Fishbone, I+O


Laboratory Tests


1/26/19 03:57








Red Blood Count 4.56, Mean Corpuscular Volume 81.1, Mean Corpuscular Hemoglobin 

24.1 L, Mean Corpuscular Hemoglobin Concent 29.7 L, Red Cell Distribution Width 

16.1 H, Calcium Level 8.2 L, Aspartate Amino Transf (AST/SGOT) 11, Alanine Am

inotransferase (ALT/SGPT) 9 L, Alkaline Phosphatase 117, Total Bilirubin 0.4, 

Total Protein 7.1, Albumin 2.6 L








Vital Signs








  Date Time  Temp Pulse Resp B/P (MAP) Pulse Ox O2 Delivery O2 Flow Rate FiO2


 


1/26/19 12:00 98.2 84 18 150/70 (96) 91 High Flow Cannula 2.0 














I&O- Last 24 Hours up to 6 AM 


 


 1/26/19





 05:59


 


Intake Total 1260 ml


 


Output Total 3995 ml


 


Balance -2735 ml

















NEFTALI SUMMERS MD                 Jan 26, 2019 12:47

## 2019-01-26 NOTE — ECGEPIP
Stationary ECG Study

                              [facility]

                                       

                                       Test Date:    2019

Pat Name:     ALESIA SANTORO           Department:   

Patient ID:   [id number]                 Room:         [place] [id number]

Gender:       M                        Technician:   AME

:          1955               Requested By: NEFTALI SUMMERS 

Order Number: [id number]     Reading MD:   Ricki Mckeon

                                 Measurements

Intervals                              Axis          

Rate:         79                       P:            53

MD:           178                      QRS:          79

QRSD:         118                      T:            30

QT:           397                                    

QTc:          455                                    

                           Interpretive Statements

Normal sinus rhythm with PVC

Q in III of undetermined significance

Intraventricular conduction delay

Prior anteroseptal infarct suggested

No significant change when compared to prior tracing of 2019

Electronically Signed On 2019 15:39:27 EST by Ricki Mckeon

## 2019-01-27 VITALS — DIASTOLIC BLOOD PRESSURE: 68 MMHG | SYSTOLIC BLOOD PRESSURE: 144 MMHG

## 2019-01-27 VITALS — SYSTOLIC BLOOD PRESSURE: 138 MMHG | DIASTOLIC BLOOD PRESSURE: 63 MMHG

## 2019-01-27 VITALS — DIASTOLIC BLOOD PRESSURE: 60 MMHG | SYSTOLIC BLOOD PRESSURE: 125 MMHG

## 2019-01-27 VITALS — DIASTOLIC BLOOD PRESSURE: 73 MMHG | SYSTOLIC BLOOD PRESSURE: 166 MMHG

## 2019-01-27 VITALS — DIASTOLIC BLOOD PRESSURE: 82 MMHG | SYSTOLIC BLOOD PRESSURE: 122 MMHG

## 2019-01-27 VITALS — SYSTOLIC BLOOD PRESSURE: 133 MMHG | DIASTOLIC BLOOD PRESSURE: 58 MMHG

## 2019-01-27 VITALS — SYSTOLIC BLOOD PRESSURE: 169 MMHG | DIASTOLIC BLOOD PRESSURE: 77 MMHG

## 2019-01-27 LAB
ALBUMIN SERPL BCG-MCNC: 2.5 GM/DL (ref 3.2–5.2)
ALT SERPL W P-5'-P-CCNC: 11 U/L (ref 12–78)
BASE EXCESS BLDA CALC-SCNC: 5.5 MMOL/L (ref -2–2)
BILIRUB SERPL-MCNC: 0.4 MG/DL (ref 0.2–1)
BUN SERPL-MCNC: 42 MG/DL (ref 7–18)
CALCIUM SERPL-MCNC: 8 MG/DL (ref 8.8–10.2)
CHLORIDE SERPL-SCNC: 96 MEQ/L (ref 98–107)
CO2 BLDA CALC-SCNC: 34.3 MEQ/L (ref 23–31)
CO2 SERPL-SCNC: 33 MEQ/L (ref 21–32)
CREAT SERPL-MCNC: 2.09 MG/DL (ref 0.7–1.3)
GFR SERPL CREATININE-BSD FRML MDRD: 34.5 ML/MIN/{1.73_M2} (ref 49–?)
GLUCOSE SERPL-MCNC: 229 MG/DL (ref 70–100)
HCO3 BLDA-SCNC: 32.5 MEQ/L (ref 22–26)
HCO3 STD BLDA-SCNC: 29.4 MEQ/L (ref 22–26)
HCT VFR BLD AUTO: 38 % (ref 42–52)
HGB BLD-MCNC: 11.2 G/DL (ref 13.5–17.5)
MAGNESIUM SERPL-MCNC: 1.7 MG/DL (ref 1.8–2.4)
MCH RBC QN AUTO: 24.3 PG (ref 27–33)
MCHC RBC AUTO-ENTMCNC: 29.5 G/DL (ref 32–36.5)
MCV RBC AUTO: 82.4 FL (ref 80–96)
PCO2 BLDA: 58.9 MMHG (ref 35–45)
PH BLDA: 7.36 UNITS (ref 7.35–7.45)
PLATELET # BLD AUTO: 174 10^3/UL (ref 150–450)
PO2 BLDA: 92.6 MMHG (ref 75–100)
POTASSIUM SERPL-SCNC: 4.3 MEQ/L (ref 3.5–5.1)
PROT SERPL-MCNC: 7.3 GM/DL (ref 6.4–8.2)
RBC # BLD AUTO: 4.61 10^6/UL (ref 4.3–6.1)
SAO2 % BLDA: 96.9 % (ref 95–99)
SODIUM SERPL-SCNC: 134 MEQ/L (ref 136–145)
WBC # BLD AUTO: 7.9 10^3/UL (ref 4–10)

## 2019-01-27 RX ADMIN — SODIUM CHLORIDE SCH UNITS: 4.5 INJECTION, SOLUTION INTRAVENOUS at 05:41

## 2019-01-27 RX ADMIN — TAMSULOSIN HYDROCHLORIDE SCH MG: 0.4 CAPSULE ORAL at 20:31

## 2019-01-27 RX ADMIN — INSULIN LISPRO SCH UNITS: 100 INJECTION, SOLUTION INTRAVENOUS; SUBCUTANEOUS at 17:23

## 2019-01-27 RX ADMIN — SODIUM CHLORIDE, PRESERVATIVE FREE SCH ML: 5 INJECTION INTRAVENOUS at 20:40

## 2019-01-27 RX ADMIN — FEBUXOSTAT SCH MG: 40 TABLET, COATED ORAL at 08:06

## 2019-01-27 RX ADMIN — ASPIRIN SCH MG: 81 TABLET ORAL at 08:07

## 2019-01-27 RX ADMIN — Medication SCH UNITS: at 08:06

## 2019-01-27 RX ADMIN — INSULIN LISPRO SCH UNITS: 100 INJECTION, SOLUTION INTRAVENOUS; SUBCUTANEOUS at 20:29

## 2019-01-27 RX ADMIN — HYDRALAZINE HYDROCHLORIDE SCH MG: 50 TABLET, FILM COATED ORAL at 20:31

## 2019-01-27 RX ADMIN — SODIUM CHLORIDE, PRESERVATIVE FREE SCH ML: 5 INJECTION INTRAVENOUS at 05:48

## 2019-01-27 RX ADMIN — SODIUM CHLORIDE, PRESERVATIVE FREE SCH ML: 5 INJECTION INTRAVENOUS at 13:01

## 2019-01-27 RX ADMIN — GABAPENTIN SCH MG: 300 CAPSULE ORAL at 20:30

## 2019-01-27 RX ADMIN — FUROSEMIDE SCH MG: 10 INJECTION, SOLUTION INTRAMUSCULAR; INTRAVENOUS at 05:49

## 2019-01-27 RX ADMIN — INSULIN LISPRO SCH UNITS: 100 INJECTION, SOLUTION INTRAVENOUS; SUBCUTANEOUS at 13:01

## 2019-01-27 RX ADMIN — SODIUM CHLORIDE SCH UNITS: 4.5 INJECTION, SOLUTION INTRAVENOUS at 13:01

## 2019-01-27 RX ADMIN — HYDRALAZINE HYDROCHLORIDE SCH MG: 50 TABLET, FILM COATED ORAL at 17:23

## 2019-01-27 RX ADMIN — INSULIN DETEMIR SCH UNITS: 100 INJECTION, SOLUTION SUBCUTANEOUS at 08:12

## 2019-01-27 RX ADMIN — SODIUM CHLORIDE SCH UNITS: 4.5 INJECTION, SOLUTION INTRAVENOUS at 20:40

## 2019-01-27 RX ADMIN — ATORVASTATIN CALCIUM SCH MG: 20 TABLET, FILM COATED ORAL at 08:06

## 2019-01-27 RX ADMIN — INSULIN LISPRO SCH UNITS: 100 INJECTION, SOLUTION INTRAVENOUS; SUBCUTANEOUS at 08:06

## 2019-01-27 RX ADMIN — GABAPENTIN SCH MG: 300 CAPSULE ORAL at 17:23

## 2019-01-27 RX ADMIN — INSULIN DETEMIR SCH UNITS: 100 INJECTION, SOLUTION SUBCUTANEOUS at 20:30

## 2019-01-27 RX ADMIN — GABAPENTIN SCH MG: 300 CAPSULE ORAL at 08:07

## 2019-01-27 RX ADMIN — FUROSEMIDE SCH MG: 10 INJECTION, SOLUTION INTRAMUSCULAR; INTRAVENOUS at 13:00

## 2019-01-27 RX ADMIN — HYDRALAZINE HYDROCHLORIDE SCH MG: 50 TABLET, FILM COATED ORAL at 08:08

## 2019-01-27 NOTE — IPNPDOC
Text Note


Date of Service


The patient was seen on 1/27/19.





NOTE


Subjective: No acute changes overnight. No CP/Palpitations.Feels well. 





PHYSICAL EXAMINATION:


Vitals: (see below) 


General: No acute distress, laying comfortably in bed.


HEENT: Moist mucous membranes.


Neck: No JVD or lymphadenopathy


Cardiac: RRR, No murmurs


Pulm: Diminished breath sounds at the bases. Coarse crackles. B/l. No wheezing, 

rhonchi


Abd: NT/ND + BS. Obese


Ext: 2+ pitting edema bilateral lower extremities right greater than left. 

Chronic lymphedema. Or cyanosis. Distal pulses intact.





LABORATORY DATA: See below.





IMAGING: 


Ultrasound bilateral lower extremities 1/24/19 Impression: Negative bilateral 

lower extremity duplex venous ultrasound.  No evidence of deep vein thrombosis.


Chest x-ray in 1/24/19


IMPRESSION:


1.  There is a minimal increase in interstitial markings in the lungs consistent


with interstitial edema.


2.  Cardiomegaly.





ASSESSMENT/PLAN: 


1. Acute decompensated diastolic heart failure- patient with EF 45% as well.  

History of severe pulmonary hypertension as well. Certainly components of 

noncompliance. Continue patient's IV Lasix. Osorio catheter for strict monitoring

of I/O. We'll monitor in telemetry. Trend cardiac enzymes. Recent echocardiogram

done on 1/14/19. Diuresing well. Change lasix to BID


2. ? MELISA. Patient will need outpatient referral to pulmonary. He is agreeable.


3. Hypertension continue carvedilol, hydralazine, Lasix.


4. Insulin-dependent diabetes mellitus continue Levemir and sliding scale 

insulin.


5. Hyperlipidemia on statin


6. History of BPH on Flomax


7. History of gout


8. History of chronic ulcer/pain on tramadol. Follow up outpatient with wound 

care. No signs of infection at this time.


9. NSVT- 6 beats on 1/24. Asymptomatic. Replace K/mag as needed. Denies chest 

pain. We'll continue to monitor on telemetry.





DVT prophylaxis heparin subcutaneous





Overall prognosis guarded.





VS,Fishbone, I+O


VS, Fishbone, I+O


Laboratory Tests


1/27/19 04:34








Red Blood Count 4.61, Mean Corpuscular Volume 82.4, Mean Corpuscular Hemoglobin 

24.3 L, Mean Corpuscular Hemoglobin Concent 29.5 L, Red Cell Distribution Width 

16.2 H, Calcium Level 8.0 L, Aspartate Amino Transf (AST/SGOT) 10, Alanine 

Aminotransferase (ALT/SGPT) 11 L, Alkaline Phosphatase 118 H, Total Bilirubin 

0.4, Total Protein 7.3, Albumin 2.5 L








Vital Signs








  Date Time  Temp Pulse Resp B/P (MAP) Pulse Ox O2 Delivery O2 Flow Rate FiO2


 


1/27/19 11:16   14  90 Nasal Cannula 3.0 


 


1/27/19 08:08  85  169/77    


 


1/27/19 08:00 98.4       














I&O- Last 24 Hours up to 6 AM0 


 


 1/27/19





 06:00


 


Intake Total 1650 ml


 


Output Total 3525 ml


 


Balance -1875 ml

















NEFTALI SUMMERS MD                 Jan 27, 2019 11:35

## 2019-01-28 VITALS — SYSTOLIC BLOOD PRESSURE: 134 MMHG | DIASTOLIC BLOOD PRESSURE: 82 MMHG

## 2019-01-28 VITALS — SYSTOLIC BLOOD PRESSURE: 143 MMHG | DIASTOLIC BLOOD PRESSURE: 86 MMHG

## 2019-01-28 VITALS — SYSTOLIC BLOOD PRESSURE: 142 MMHG | DIASTOLIC BLOOD PRESSURE: 78 MMHG

## 2019-01-28 VITALS — SYSTOLIC BLOOD PRESSURE: 148 MMHG | DIASTOLIC BLOOD PRESSURE: 80 MMHG

## 2019-01-28 VITALS — DIASTOLIC BLOOD PRESSURE: 82 MMHG | SYSTOLIC BLOOD PRESSURE: 142 MMHG

## 2019-01-28 LAB
ALBUMIN SERPL BCG-MCNC: 2.8 GM/DL (ref 3.2–5.2)
ALT SERPL W P-5'-P-CCNC: 13 U/L (ref 12–78)
BILIRUB SERPL-MCNC: 0.6 MG/DL (ref 0.2–1)
BUN SERPL-MCNC: 47 MG/DL (ref 7–18)
CALCIUM SERPL-MCNC: 8.1 MG/DL (ref 8.8–10.2)
CHLORIDE SERPL-SCNC: 95 MEQ/L (ref 98–107)
CO2 SERPL-SCNC: 34 MEQ/L (ref 21–32)
CREAT SERPL-MCNC: 2.21 MG/DL (ref 0.7–1.3)
GFR SERPL CREATININE-BSD FRML MDRD: 32.4 ML/MIN/{1.73_M2} (ref 49–?)
GLUCOSE SERPL-MCNC: 244 MG/DL (ref 70–100)
HCT VFR BLD AUTO: 39.4 % (ref 42–52)
HGB BLD-MCNC: 11.6 G/DL (ref 13.5–17.5)
MAGNESIUM SERPL-MCNC: 2.2 MG/DL (ref 1.8–2.4)
MCH RBC QN AUTO: 24.4 PG (ref 27–33)
MCHC RBC AUTO-ENTMCNC: 29.4 G/DL (ref 32–36.5)
MCV RBC AUTO: 82.8 FL (ref 80–96)
PLATELET # BLD AUTO: 194 10^3/UL (ref 150–450)
POTASSIUM SERPL-SCNC: 4.5 MEQ/L (ref 3.5–5.1)
PROT SERPL-MCNC: 7.8 GM/DL (ref 6.4–8.2)
RBC # BLD AUTO: 4.76 10^6/UL (ref 4.3–6.1)
SODIUM SERPL-SCNC: 135 MEQ/L (ref 136–145)
WBC # BLD AUTO: 8 10^3/UL (ref 4–10)

## 2019-01-28 RX ADMIN — GABAPENTIN SCH MG: 300 CAPSULE ORAL at 21:14

## 2019-01-28 RX ADMIN — SODIUM CHLORIDE SCH UNITS: 4.5 INJECTION, SOLUTION INTRAVENOUS at 14:00

## 2019-01-28 RX ADMIN — INSULIN DETEMIR SCH UNITS: 100 INJECTION, SOLUTION SUBCUTANEOUS at 09:44

## 2019-01-28 RX ADMIN — ASPIRIN SCH MG: 81 TABLET ORAL at 09:46

## 2019-01-28 RX ADMIN — INSULIN LISPRO SCH UNITS: 100 INJECTION, SOLUTION INTRAVENOUS; SUBCUTANEOUS at 21:00

## 2019-01-28 RX ADMIN — Medication SCH UNITS: at 08:13

## 2019-01-28 RX ADMIN — HYDRALAZINE HYDROCHLORIDE SCH MG: 50 TABLET, FILM COATED ORAL at 21:14

## 2019-01-28 RX ADMIN — SODIUM CHLORIDE, PRESERVATIVE FREE SCH ML: 5 INJECTION INTRAVENOUS at 04:56

## 2019-01-28 RX ADMIN — INSULIN LISPRO SCH UNITS: 100 INJECTION, SOLUTION INTRAVENOUS; SUBCUTANEOUS at 08:13

## 2019-01-28 RX ADMIN — INSULIN LISPRO SCH UNITS: 100 INJECTION, SOLUTION INTRAVENOUS; SUBCUTANEOUS at 12:27

## 2019-01-28 RX ADMIN — FUROSEMIDE SCH MG: 10 INJECTION, SOLUTION INTRAMUSCULAR; INTRAVENOUS at 00:00

## 2019-01-28 RX ADMIN — HYDRALAZINE HYDROCHLORIDE SCH MG: 50 TABLET, FILM COATED ORAL at 09:45

## 2019-01-28 RX ADMIN — SODIUM CHLORIDE SCH UNITS: 4.5 INJECTION, SOLUTION INTRAVENOUS at 04:56

## 2019-01-28 RX ADMIN — SODIUM CHLORIDE, PRESERVATIVE FREE SCH ML: 5 INJECTION INTRAVENOUS at 14:40

## 2019-01-28 RX ADMIN — SODIUM CHLORIDE, PRESERVATIVE FREE SCH ML: 5 INJECTION INTRAVENOUS at 21:14

## 2019-01-28 RX ADMIN — HYDRALAZINE HYDROCHLORIDE SCH MG: 50 TABLET, FILM COATED ORAL at 17:16

## 2019-01-28 RX ADMIN — GABAPENTIN SCH MG: 300 CAPSULE ORAL at 17:16

## 2019-01-28 RX ADMIN — FEBUXOSTAT SCH MG: 40 TABLET, COATED ORAL at 09:44

## 2019-01-28 RX ADMIN — ATORVASTATIN CALCIUM SCH MG: 20 TABLET, FILM COATED ORAL at 09:45

## 2019-01-28 RX ADMIN — TAMSULOSIN HYDROCHLORIDE SCH MG: 0.4 CAPSULE ORAL at 21:13

## 2019-01-28 RX ADMIN — INSULIN DETEMIR SCH UNITS: 100 INJECTION, SOLUTION SUBCUTANEOUS at 21:00

## 2019-01-28 RX ADMIN — INSULIN LISPRO SCH UNITS: 100 INJECTION, SOLUTION INTRAVENOUS; SUBCUTANEOUS at 17:16

## 2019-01-28 RX ADMIN — SODIUM CHLORIDE SCH UNITS: 4.5 INJECTION, SOLUTION INTRAVENOUS at 20:52

## 2019-01-28 RX ADMIN — GABAPENTIN SCH MG: 300 CAPSULE ORAL at 09:45

## 2019-01-28 NOTE — IPNPDOC
Text Note


Date of Service


The patient was seen on 1/28/19.





NOTE


Subjective: States he feels well with no acute changes overnight. Denies any 

chest pain. No palpitations. Dyspnea is improving. Our extremity edema is 

improving.





PHYSICAL EXAMINATION:


Vitals: (see below) 


General: No acute distress, laying comfortably in bed.


HEENT: Moist mucous membranes.


Neck: No JVD or lymphadenopathy


Cardiac: RRR, No murmurs


Pulm: Diminished breath sounds at the bases. Coarse crackles. B/l. No wheezing, 

rhonchi


Abd: NT/ND + BS. Obese


Ext: 1-2+ pitting edema bilateral lower extremities right greater than left. 

Chronic lymphedema. Or cyanosis. Distal pulses intact.





LABORATORY DATA: See below.





IMAGING: 


Ultrasound bilateral lower extremities 1/24/19 Impression: Negative bilateral 

lower extremity duplex venous ultrasound.  No evidence of deep vein thrombosis.


Chest x-ray in 1/24/19


IMPRESSION:


1.  There is a minimal increase in interstitial markings in the lungs consistent


with interstitial edema.


2.  Cardiomegaly.





ASSESSMENT/PLAN: 


1. Acute decompensated diastolic heart failure- patient with EF 45% as well.  

History of severe pulmonary hypertension as well. Certainly components of 

noncompliance. Continue patient's IV Lasix. Osorio catheter for strict monitoring

of I/O. We'll monitor in telemetry. Trend cardiac enzymes. Recent echocardiogram

done on 1/14/19. Diuresing well. Hold Lasix for today and reevaluate kidney 

function tomorrow given rise in Cr. Will need close outpatient follow-up with 

cardiology.


2. ? MELISA. Patient will need outpatient referral to pulmonary. He is agreeable.


3. Hypertension continue carvedilol, hydralazine, Lasix.


4. Insulin-dependent diabetes mellitus . uncontrolled. Levemir dose increased. 

SSI. 


5. Hyperlipidemia on statin


6. History of BPH on Flomax


7. History of gout


8. History of chronic ulcer/pain on tramadol. Follow up outpatient with wound 

care. No signs of infection at this time.


9. NSVT- 6 beats on 1/24. Asymptomatic. Replace K/mag as needed. Denies chest 

pain. We'll continue to monitor on telemetry.





DVT prophylaxis heparin subcutaneous





Overall prognosis guarded.





VS,Fishbone, I+O


VS, Fishbone, I+O


Laboratory Tests


1/28/19 04:50








Red Blood Count 4.76, Mean Corpuscular Volume 82.8, Mean Corpuscular Hemoglobin 

24.4 L, Mean Corpuscular Hemoglobin Concent 29.4 L, Red Cell Distribution Width 

16.4 H, Calcium Level 8.1 L, Aspartate Amino Transf (AST/SGOT) 13, Alanine 

Aminotransferase (ALT/SGPT) 13, Alkaline Phosphatase 114, Total Bilirubin 0.6, 

Total Protein 7.8, Albumin 2.8 L








Vital Signs








  Date Time  Temp Pulse Resp B/P (MAP) Pulse Ox O2 Delivery O2 Flow Rate FiO2


 


1/28/19 12:00 97.3 79 20 148/80 (102) 94 Nasal Cannula 3.0 














I&O- Last 24 Hours up to 6 AM 


 


 1/28/19





 05:59


 


Intake Total 1620 ml


 


Output Total 2965 ml


 


Balance -1345 ml

















NEFTALI SUMMERS MD                 Jan 28, 2019 13:43

## 2019-01-29 VITALS — DIASTOLIC BLOOD PRESSURE: 82 MMHG | SYSTOLIC BLOOD PRESSURE: 136 MMHG

## 2019-01-29 VITALS — DIASTOLIC BLOOD PRESSURE: 76 MMHG | SYSTOLIC BLOOD PRESSURE: 130 MMHG

## 2019-01-29 VITALS — SYSTOLIC BLOOD PRESSURE: 138 MMHG | DIASTOLIC BLOOD PRESSURE: 82 MMHG

## 2019-01-29 VITALS — SYSTOLIC BLOOD PRESSURE: 134 MMHG | DIASTOLIC BLOOD PRESSURE: 80 MMHG

## 2019-01-29 VITALS — SYSTOLIC BLOOD PRESSURE: 140 MMHG | DIASTOLIC BLOOD PRESSURE: 82 MMHG

## 2019-01-29 LAB
ALBUMIN SERPL BCG-MCNC: 2.7 GM/DL (ref 3.2–5.2)
ALT SERPL W P-5'-P-CCNC: 16 U/L (ref 12–78)
BILIRUB SERPL-MCNC: 0.4 MG/DL (ref 0.2–1)
BUN SERPL-MCNC: 49 MG/DL (ref 7–18)
CALCIUM SERPL-MCNC: 8.1 MG/DL (ref 8.8–10.2)
CHLORIDE SERPL-SCNC: 97 MEQ/L (ref 98–107)
CO2 SERPL-SCNC: 33 MEQ/L (ref 21–32)
CREAT SERPL-MCNC: 2.16 MG/DL (ref 0.7–1.3)
GFR SERPL CREATININE-BSD FRML MDRD: 33.3 ML/MIN/{1.73_M2} (ref 49–?)
GLUCOSE SERPL-MCNC: 270 MG/DL (ref 70–100)
HCT VFR BLD AUTO: 37.8 % (ref 42–52)
HGB BLD-MCNC: 11.4 G/DL (ref 13.5–17.5)
MAGNESIUM SERPL-MCNC: 2 MG/DL (ref 1.8–2.4)
MCH RBC QN AUTO: 24.8 PG (ref 27–33)
MCHC RBC AUTO-ENTMCNC: 30.2 G/DL (ref 32–36.5)
MCV RBC AUTO: 82.2 FL (ref 80–96)
PLATELET # BLD AUTO: 166 10^3/UL (ref 150–450)
POTASSIUM SERPL-SCNC: 4.5 MEQ/L (ref 3.5–5.1)
PROT SERPL-MCNC: 7.6 GM/DL (ref 6.4–8.2)
RBC # BLD AUTO: 4.6 10^6/UL (ref 4.3–6.1)
SODIUM SERPL-SCNC: 135 MEQ/L (ref 136–145)
WBC # BLD AUTO: 7.1 10^3/UL (ref 4–10)

## 2019-01-29 RX ADMIN — ASPIRIN SCH MG: 81 TABLET ORAL at 08:50

## 2019-01-29 RX ADMIN — Medication SCH UNITS: at 07:55

## 2019-01-29 RX ADMIN — GABAPENTIN SCH MG: 300 CAPSULE ORAL at 08:51

## 2019-01-29 RX ADMIN — INSULIN LISPRO SCH UNITS: 100 INJECTION, SOLUTION INTRAVENOUS; SUBCUTANEOUS at 11:56

## 2019-01-29 RX ADMIN — HYDRALAZINE HYDROCHLORIDE SCH MG: 50 TABLET, FILM COATED ORAL at 08:49

## 2019-01-29 RX ADMIN — INSULIN LISPRO SCH UNITS: 100 INJECTION, SOLUTION INTRAVENOUS; SUBCUTANEOUS at 07:54

## 2019-01-29 RX ADMIN — SODIUM CHLORIDE SCH UNITS: 4.5 INJECTION, SOLUTION INTRAVENOUS at 05:05

## 2019-01-29 RX ADMIN — FEBUXOSTAT SCH MG: 40 TABLET, COATED ORAL at 08:51

## 2019-01-29 RX ADMIN — ATORVASTATIN CALCIUM SCH MG: 20 TABLET, FILM COATED ORAL at 08:51

## 2019-01-29 RX ADMIN — INSULIN DETEMIR SCH UNITS: 100 INJECTION, SOLUTION SUBCUTANEOUS at 08:48

## 2019-01-29 RX ADMIN — SODIUM CHLORIDE, PRESERVATIVE FREE SCH ML: 5 INJECTION INTRAVENOUS at 05:06

## 2019-01-29 NOTE — DSES
DATE OF ADMISSION:  01/24/2019

DATE OF DISCHARGE: 01/29/2019

 

PRIMARY CARE PROVIDER: Dr. Barry

 

FINAL DIAGNOSIS: Acute decompensated diastolic heart failure with ejection

fraction with systolic dysfunction. Injection fraction of 45%, obstructive sleep

apnea, morbid obesity, hypertension, insulin dependant diabetes mellitus,

dyslipidemia, history of BPH, history of gout, history of chronic lower extremity

ulcers, nonsustained V-tach, history of severe pulmonary artery hypertension poor

compliance.

 

HISTORY OF PRESENT ILLNESS: This is a 63-year-old male patient with underlying

medical history of congestive heart failure, ejection fraction 45% with diastolic

dysfunction, severe pulmonary artery hypertension, questionable obstructive sleep

apnea, chronic kidney disease stage 3, baseline creatinine 1.922, obesity,

hypoventilation syndrome, diabetes mellitus, chronic lower extremity lymphedema,

coronary artery disease with PCI who presented with complaints of shortness of

breath. Patient stated that he has been having shortness of breath over the past

few days. Patient reported has been having increased torsemide 40 mg by mouth

twice a day, however has been running to the bathroom more frequently, some

question of noncompliance given his sudden change in urination. Patient drinks

about 3 cups of water daily and does not follow a low sodium diet. Patient denies

any chest pain, pressure or discomfort and denies any palpitations and denies any

syncope. Does have dyspnea as well as dyspnea on exertion. Baseline breathing was

ordered. Workup positive for orthopnea. Increasing lower extremity weakness.

 

HOSPITAL COURSE: Patient admitted to the hospital and is diuresed. Daily weight

has been monitored. Strict intake and output has been monitored. Recent echo

appreciated. Diuretics has been adjusted with respect to kidney function.

Initially placed on Osorio catheter and later Osorio catheter has been

discontinued. Patient's blood pressure medication was continued and adjusted.

Norvasc has been initially added. Patient is also on aspirin statin, beta

blockers. Patients insulin has also been adjusted. Patient currently tolerating

oral and speaks in full sentences. Able to ambulate back to baseline respiration.

Patient is on 3 liters at home, ready to be discharged for further care as

outpatient. Patient compliance has been encouraged.

 

VITAL SIGNS: Temperature 97.9, pulse 84, respirations 20, blood pressure 140/82,

pulse ox 92% on 2 liters nasal cannula.

 

LABORATORY: WBC 7.1 hemoglobin and hematocrit 11.4 over 37.8, platelets 166.

Chemistry: Sodium 135, potassium 4.5, chloride 97, bicarbonate 33, BUN 49,

creatinine 2.16

GENERAL: Patient morbidly obese, alert comfortable in no acute distress. Speaks

in full sentences.

PULMONARY: Bilateral clear to auscultation.

CARDIAC: Regular S1, S2.

ABDOMEN: Soft and nontender.

EXTREMITIES: 2+ bilateral lower extremity edema with chronic lymphedema. Distal

pulses intact.

 

DISCHARGE MEDICATION: Hydralazine 50 mg three times a day, aspirin 81 mg by mouth

daily, Lipitor 40 mg by mouth daily, Calcitriol 0.25 mcg by mouth daily, Coreg

3.125 mg by mouth twice a day, vitamin D 1,000 units by mouth daily, Uloric 40 mg

by mouth daily, gabapentin 300 mg by mouth three times a day, magnesium oxide 400

mg by mouth daily, sublingual nitro 0.4 as needed, Flomax 0.4 mg by mouth at

bedtime, torsemide 40 mg by mouth twice a day, tramadol 50 mg by mouth every 8

hours as needed, Levemir 30 units subcu twice a day, increase based on finger

sticks.

 

DISCHARGE INSTRUCTION: Fluid restriction 1.8 liters per day. Please see primary

care provider in 7 days. Need improve glycemic control, adjust medication for

blood pressure and diabetes per primary care provider. Check kidney function with

primary care provider, daily weight if weight increases more than 5 pounds return

to the hospital if symptoms worsen.

## 2019-03-25 ENCOUNTER — HOSPITAL ENCOUNTER (OUTPATIENT)
Dept: HOSPITAL 53 - M SLEEP | Age: 64
End: 2019-03-25
Attending: NURSE PRACTITIONER
Payer: MEDICAID

## 2019-03-25 DIAGNOSIS — G47.33: Primary | ICD-10-CM

## 2019-03-27 NOTE — SLEEPCENT
DATE OF STUDY:  03/25/2019

 

ORDERING PROVIDER:  MART Sofia

 

Nocturnal polysomnography was performed for evaluation and sleep physiology in

this patient with a history of excessive somnolence and nonrestorative sleep.

 

9 hours and 49 minutes of data were reviewed.  There were 467 minutes of sleep

identified.  Sleep latency was short at 1.5 minutes.  Rapid eye movement (REM)

latency was delayed at 341 minutes.  Sleep architecture improved markedly after

interventions were made.  Overall sleep efficiency was 82%.  The patient's

electrocardiogram showed what appeared to be a sinus rhythm.  Average heart rate

80 beats per minute.  Electroencephalogram (EEG) showed normal waveforms for

awake and sleep.  There were 462 respiratory events identified of 10 seconds in

duration or greater for an apnea-hypopnea index of 59.4.  The events were

obstructive and associated with oxygen desaturations into the 60s.

 

Having clearly established the presence of obstructive sleep apnea syndrome,

testing was stopped for the application of pressure therapy.  The patient was fit

with a [employer] Ying View full face mask of large size.  5 cm of water

pressure was initially applied to the circuit, and the lights were extinguished.

Obstructive respiratory events were well palliated with continuous positive

airway pressure (CPAP) at a pressure of +12, however, the patient continued to

display profound oxygen desaturations, prompting the addition of supplemental

oxygen.  Despite supplemental oxygen at 6 liters per minute, desaturations into

the 70s were persistent.  At the completion of testing, the patient was on 6

liters of oxygen and 12 cm of CPAP.

 

IMPRESSIONS:  Are:

 

1.  Severe obstructive sleep apnea syndrome (G47.33).   Apnea-hypopnea index

59.4.

 

2.  Nocturnal hypoxemia.

 

RECOMMENDATION:  Initiation of CPAP at 12 cm of water is sufficient to address

the patient's obstructive respiratory events.  Supplemental oxygen at 6 liters

improved oxygen desaturations but did not fully relieve them.  Close clinical

followup, optimal management of the patient's other underlying medical

conditions, and nocturnal oximetry may be helpful at more fully addressing the

hypoxemia identified.

## 2019-04-02 ENCOUNTER — HOSPITAL ENCOUNTER (OUTPATIENT)
Dept: HOSPITAL 53 - M SLEEP | Age: 64
End: 2019-04-02
Attending: NURSE PRACTITIONER
Payer: MEDICARE

## 2019-04-02 DIAGNOSIS — G47.33: Primary | ICD-10-CM

## 2019-04-04 NOTE — SLEEPCENT
DATE OF PROCEDURE:  04/02/2019

 

ORDERED BY:  Calli Quesada

 

Nocturnal polysomnography was performed for titration of pressure therapy in this

patient with severe obstructive sleep apnea syndrome.  Apnea-hypopnea index of

59.4.

 

For testing, a [employer] Ying View full face mask of large size was used, 12

cm of water pressure were applied to the circuit and the lights were

extinguished.

 

8 hours and 42 minutes of data were reviewed.  There were 485 minutes of sleep

identified.  Sleep latency was short at 3 minutes.  Rapid eye movement (REM)

latency was normal at 78 minutes.  Sleep architecture improved with optimal

pressure therapy.  There was evidence of REM rebound with five REM cycles noted.

Overall sleep efficiency was 94.4%.  The electrocardiogram showed a sinus rhythm

with an average heart rate of 76 beats per minute.  EEG showed normal waveforms

for awake and sleep.  Persistence of respiratory events prompted an increase in

continuous positive airway pressure (CPAP) pressure and despite optimal mask fit

and minimal air leak, the patient was changed to a bilevel device.  Progressive

increases in bilevel pressure were made, and obstructive events were able to be

palliated at an inspiratory pressure of 21 over expiratory pressure of 17.

Persistence of hypoxemia and significant oxygen desaturations prompted the

addition of supplemental oxygen.  Despite progressive increases in oxygen flow

rate, saturations remained in the upper 80s despite 12 liters of oxygen flow.

There was some limb activity noted as well, but limb movement arousal index was

low.

 

IMPRESSION:

1.  Obstructive sleep apnea syndrome (G47.33).

2.  Hypoxemia.

 

RECOMMENDATIONS:

Obstructive respiratory events were best palliated with a bilevel pressure device

inspiratory of 21 over expiratory of 17.  The addition of supplemental oxygen at

12 liters resulted in a significant improvement in oxygen saturation.  However,

saturations remained in the upper 80s despite this flow.  Given the severity of

the patient's disease, initiation of bilevel pressure therapy and supplemental

oxygen as outlined above are recommended, as is close clinical followup as the

flow rate of oxygen may need to be further advanced.

## 2019-04-09 ENCOUNTER — HOSPITAL ENCOUNTER (OUTPATIENT)
Dept: HOSPITAL 53 - M RAD | Age: 64
End: 2019-04-09
Attending: FAMILY MEDICINE
Payer: MEDICARE

## 2019-04-09 DIAGNOSIS — I65.23: ICD-10-CM

## 2019-04-09 DIAGNOSIS — I73.9: Primary | ICD-10-CM

## 2019-04-09 NOTE — REP
Bilateral carotid artery duplex ultrasound:

 

Peak flow velocity analysis:

 

-----------------------------------------------RIGHT-----------------LEFT

 

  ICA Peak flow velocity cm/sec 88.7       91.1

  ICA Diastolic  flow velocity cm/sec 23.3      825.2

  ICA/CCA Ratio                 0.9        0.8

  ECA Peak flow velocity cm/sec 93.1       177

  CCA Peak flow velocity cm/sec 96.2       111

 

  There is mild atheromatous plaque bilaterally in the common carotid arteries

  extending into the internal carotid arteries and external carotid arteries

  bilaterally.

  The peak flow velocities are normal except for elevated peak flow velocity in

  the left external carotid artery.  This is compatible with 50 - 69% stenosis.

 

  The peak flow velocities in all of the other vessels are normal, indicating

  less than 50% stenosis.  The

 

  There is antegrade flow in the vertebral arteries bilaterally.

 

  Impression:

 

  There is 50 - 69% stenosis of the left external carotid artery.

  There is less than 50% stenosis in the right external carotid artery and in the

  right and left internal carotid arteries.

 

 

 

 

 

 

Electronically Signed by

Fernando Ashley MD 04/09/2019 01:33 P

## 2019-04-12 ENCOUNTER — HOSPITAL ENCOUNTER (OUTPATIENT)
Dept: HOSPITAL 53 - M LAB REF | Age: 64
End: 2019-04-12
Attending: STUDENT IN AN ORGANIZED HEALTH CARE EDUCATION/TRAINING PROGRAM
Payer: MEDICARE

## 2019-04-12 DIAGNOSIS — E11.65: ICD-10-CM

## 2019-04-12 DIAGNOSIS — I73.9: Primary | ICD-10-CM

## 2019-04-12 LAB — EST. AVERAGE GLUCOSE BLD GHB EST-MCNC: 260 MG/DL (ref 60–110)
